# Patient Record
Sex: MALE | Race: WHITE | Employment: UNEMPLOYED | URBAN - METROPOLITAN AREA
[De-identification: names, ages, dates, MRNs, and addresses within clinical notes are randomized per-mention and may not be internally consistent; named-entity substitution may affect disease eponyms.]

---

## 2017-02-27 ENCOUNTER — APPOINTMENT (OUTPATIENT)
Dept: GENERAL RADIOLOGY | Age: 57
End: 2017-02-27
Attending: EMERGENCY MEDICINE
Payer: SELF-PAY

## 2017-02-27 ENCOUNTER — HOSPITAL ENCOUNTER (EMERGENCY)
Age: 57
Discharge: HOME | End: 2017-02-27
Attending: EMERGENCY MEDICINE
Payer: SELF-PAY

## 2017-02-27 ENCOUNTER — APPOINTMENT (OUTPATIENT)
Dept: CT IMAGING | Age: 57
End: 2017-02-27
Attending: EMERGENCY MEDICINE
Payer: SELF-PAY

## 2017-02-27 VITALS
OXYGEN SATURATION: 95 % | DIASTOLIC BLOOD PRESSURE: 77 MMHG | TEMPERATURE: 98.9 F | HEART RATE: 71 BPM | BODY MASS INDEX: 21.36 KG/M2 | WEIGHT: 144.25 LBS | RESPIRATION RATE: 18 BRPM | SYSTOLIC BLOOD PRESSURE: 122 MMHG | HEIGHT: 69 IN

## 2017-02-27 DIAGNOSIS — R10.33 PERIUMBILICAL ABDOMINAL PAIN: Primary | ICD-10-CM

## 2017-02-27 LAB
ALBUMIN SERPL BCP-MCNC: 4.2 G/DL (ref 3.5–5)
ALBUMIN/GLOB SERPL: 1.1 {RATIO} (ref 1.1–2.2)
ALP SERPL-CCNC: 101 U/L (ref 45–117)
ALT SERPL-CCNC: 22 U/L (ref 12–78)
ANION GAP BLD CALC-SCNC: 8 MMOL/L (ref 5–15)
AST SERPL W P-5'-P-CCNC: 11 U/L (ref 15–37)
BASOPHILS # BLD AUTO: 0 K/UL (ref 0–0.1)
BASOPHILS # BLD: 0 % (ref 0–1)
BILIRUB SERPL-MCNC: 0.6 MG/DL (ref 0.2–1)
BUN SERPL-MCNC: 8 MG/DL (ref 6–20)
BUN/CREAT SERPL: 9 (ref 12–20)
CALCIUM SERPL-MCNC: 9.3 MG/DL (ref 8.5–10.1)
CHLORIDE SERPL-SCNC: 101 MMOL/L (ref 97–108)
CO2 SERPL-SCNC: 27 MMOL/L (ref 21–32)
CREAT SERPL-MCNC: 0.85 MG/DL (ref 0.7–1.3)
EOSINOPHIL # BLD: 0 K/UL (ref 0–0.4)
EOSINOPHIL NFR BLD: 0 % (ref 0–7)
ERYTHROCYTE [DISTWIDTH] IN BLOOD BY AUTOMATED COUNT: 13.9 % (ref 11.5–14.5)
GLOBULIN SER CALC-MCNC: 4 G/DL (ref 2–4)
GLUCOSE SERPL-MCNC: 102 MG/DL (ref 65–100)
HCT VFR BLD AUTO: 41.5 % (ref 36.6–50.3)
HGB BLD-MCNC: 13.6 G/DL (ref 12.1–17)
LIPASE SERPL-CCNC: 273 U/L (ref 73–393)
LYMPHOCYTES # BLD AUTO: 15 % (ref 12–49)
LYMPHOCYTES # BLD: 1.8 K/UL (ref 0.8–3.5)
MCH RBC QN AUTO: 28.3 PG (ref 26–34)
MCHC RBC AUTO-ENTMCNC: 32.8 G/DL (ref 30–36.5)
MCV RBC AUTO: 86.5 FL (ref 80–99)
MONOCYTES # BLD: 0.5 K/UL (ref 0–1)
MONOCYTES NFR BLD AUTO: 4 % (ref 5–13)
NEUTS SEG # BLD: 9.8 K/UL (ref 1.8–8)
NEUTS SEG NFR BLD AUTO: 81 % (ref 32–75)
PLATELET # BLD AUTO: 330 K/UL (ref 150–400)
POTASSIUM SERPL-SCNC: 3.8 MMOL/L (ref 3.5–5.1)
PROT SERPL-MCNC: 8.2 G/DL (ref 6.4–8.2)
RBC # BLD AUTO: 4.8 M/UL (ref 4.1–5.7)
SODIUM SERPL-SCNC: 136 MMOL/L (ref 136–145)
WBC # BLD AUTO: 12.1 K/UL (ref 4.1–11.1)

## 2017-02-27 PROCEDURE — 74011250636 HC RX REV CODE- 250/636: Performed by: EMERGENCY MEDICINE

## 2017-02-27 PROCEDURE — 83690 ASSAY OF LIPASE: CPT | Performed by: EMERGENCY MEDICINE

## 2017-02-27 PROCEDURE — 74011636320 HC RX REV CODE- 636/320: Performed by: EMERGENCY MEDICINE

## 2017-02-27 PROCEDURE — 99284 EMERGENCY DEPT VISIT MOD MDM: CPT

## 2017-02-27 PROCEDURE — 74011000258 HC RX REV CODE- 258: Performed by: EMERGENCY MEDICINE

## 2017-02-27 PROCEDURE — 71020 XR CHEST PA LAT: CPT

## 2017-02-27 PROCEDURE — 36415 COLL VENOUS BLD VENIPUNCTURE: CPT | Performed by: EMERGENCY MEDICINE

## 2017-02-27 PROCEDURE — 74177 CT ABD & PELVIS W/CONTRAST: CPT

## 2017-02-27 PROCEDURE — 96374 THER/PROPH/DIAG INJ IV PUSH: CPT

## 2017-02-27 PROCEDURE — 80053 COMPREHEN METABOLIC PANEL: CPT | Performed by: EMERGENCY MEDICINE

## 2017-02-27 PROCEDURE — 85025 COMPLETE CBC W/AUTO DIFF WBC: CPT | Performed by: EMERGENCY MEDICINE

## 2017-02-27 RX ORDER — HYDROCODONE BITARTRATE AND ACETAMINOPHEN 5; 325 MG/1; MG/1
1 TABLET ORAL
Qty: 20 TAB | Refills: 0 | Status: ON HOLD | OUTPATIENT
Start: 2017-02-27 | End: 2020-10-14

## 2017-02-27 RX ORDER — SODIUM CHLORIDE 0.9 % (FLUSH) 0.9 %
10 SYRINGE (ML) INJECTION
Status: COMPLETED | OUTPATIENT
Start: 2017-02-27 | End: 2017-02-27

## 2017-02-27 RX ORDER — MORPHINE SULFATE 4 MG/ML
6 INJECTION, SOLUTION INTRAMUSCULAR; INTRAVENOUS
Status: COMPLETED | OUTPATIENT
Start: 2017-02-27 | End: 2017-02-27

## 2017-02-27 RX ADMIN — Medication 10 ML: at 21:31

## 2017-02-27 RX ADMIN — SODIUM CHLORIDE 100 ML: 900 INJECTION, SOLUTION INTRAVENOUS at 21:31

## 2017-02-27 RX ADMIN — IOPAMIDOL 100 ML: 755 INJECTION, SOLUTION INTRAVENOUS at 21:31

## 2017-02-27 RX ADMIN — Medication 6 MG: at 22:01

## 2017-02-28 NOTE — ED PROVIDER NOTES
HPI Comments: 65 y.o. male with past medical history significant for substance abuse, depression, mood disorder, hugo, and bipolar disorder who presents from home with chief complaint of abdominal pain. Patient states he has a hx of umbilical hernia 1 year ago, but did not have surgery. Patient notes he was picking up trees around 1 week ago when he suddenly had umbilical abdominal pain. Patient reports the pain has progressively worsened and described as a \"cutting inside. \" Patient also complains of cough, diarrhea, and fever. Patient notes he has chest pain when he has a strong cough on the right side. Patient notes he has a hx of lung nodules and cyst. Patient admits to smoking, but denies alcohol use. There are no other acute medical concerns at this time. Social hx: Tobacco Use: Yes (1 pack per day), Alcohol Use: No, Drug Use: Yes (Marijuana)    Note written by Maggie Montanez, as dictated by Susan Dangelo MD 9:14 PM      The history is provided by the patient. Past Medical History:   Diagnosis Date    B[facility] ([facility] [address]     Depression     Hugo ([facility] [address]     Mood disorder (HCC)     Psychotic disorder     Substance abuse        Past Surgical History:   Procedure Laterality Date    ABDOMEN SURGERY PROC UNLISTED      hernia         History reviewed. No pertinent family history. Social History     Social History    Marital status:      Spouse name: N/A    Number of children: N/A    Years of education: N/A     Occupational History    Not on file.      Social History Main Topics    Smoking status: Current Every Day Smoker     Packs/day: 1.00    Smokeless tobacco: Not on file    Alcohol use No    Drug use: Yes     Special: Marijuana    Sexual activity: Not on file     Other Topics Concern    Not on file     Social History Narrative    ** Merged History Encounter **              ALLERGIES: Bee sting [sting, bee]    Review of Systems   Constitutional: Positive for fever. Negative for chills. HENT: Negative for rhinorrhea and sore throat. Respiratory: Positive for cough. Negative for shortness of breath. Cardiovascular: Positive for chest pain. Gastrointestinal: Positive for abdominal pain and diarrhea. Negative for nausea and vomiting. Genitourinary: Negative for dysuria and hematuria. Musculoskeletal: Negative for arthralgias and myalgias. Skin: Negative for pallor and rash. Neurological: Negative for dizziness, weakness and light-headedness. All other systems reviewed and are negative. Vitals:    02/27/17 1951   BP: 133/76   Pulse: 90   Resp: 16   Temp: 98.9 °F (37.2 °C)   SpO2: 96%   Weight: 65.4 kg (144 lb 4 oz)   Height: 5' 9\" (1.753 m)            Physical Exam     Const:  No acute distress, well developed, well nourished  Head:  Atraumatic, normocephalic  Eyes:  PERRL, conjunctiva normal, no scleral icterus  Neck:  Supple, trachea midline  Cardiovascular:  RRR, no murmurs, no gallops, no rubs  Resp:  No resp distress, no increased work of breathing, no wheezes, no rhonchi, no rales,  Abd:  Soft, non-distended, no rebound, no guarding, no CVA tenderness  Minimal periumbilical tenderness upon palpation  :  Deferred  MSK:  No pedal edema, normal ROM  Neuro:  Alert and oriented x3, no cranial nerve defect  Skin:  Warm, dry, intact  Psych: normal mood and affect, behavior is normal, judgement and thought content is normal    Note written by Maggie Bowman, as dictated by Margaret Winters MD 9:18 PM    MDM  Number of Diagnoses or Management Options  Periumbilical abdominal pain:      Amount and/or Complexity of Data Reviewed  Clinical lab tests: ordered and reviewed  Tests in the radiology section of CPT®: ordered and reviewed  Review and summarize past medical records: yes    Patient Progress  Patient progress: stable    ED Course     Pt. Presents to the ER with abdominal pain at the site of his known hernia.   He is well appearing in the ER. Pt. Also is getting over a flu-like illness. No pancreatitis, abscess, obstruction, incarcerated hernia, or other acute process on CT. Pt's sx have improved in the ER. I will start him on norco as needed for pain. Pt. To f/u with his PCP or return to the ER with worsening sx.       Procedures

## 2017-02-28 NOTE — ED NOTES
Pt repositioned for comfort. V/S stable, no distress noted. Will continue to assess and monitor closely.

## 2017-02-28 NOTE — DISCHARGE INSTRUCTIONS
Abdominal Pain: Care Instructions  Your Care Instructions    Abdominal pain has many possible causes. Some aren't serious and get better on their own in a few days. Others need more testing and treatment. If your pain continues or gets worse, you need to be rechecked and may need more tests to find out what is wrong. You may need surgery to correct the problem. Don't ignore new symptoms, such as fever, nausea and vomiting, urination problems, pain that gets worse, and dizziness. These may be signs of a more serious problem. Your doctor may have recommended a follow-up visit in the next 8 to 12 hours. If you are not getting better, you may need more tests or treatment. The doctor has checked you carefully, but problems can develop later. If you notice any problems or new symptoms, get medical treatment right away. Follow-up care is a key part of your treatment and safety. Be sure to make and go to all appointments, and call your doctor if you are having problems. It's also a good idea to know your test results and keep a list of the medicines you take. How can you care for yourself at home? · Rest until you feel better. · To prevent dehydration, drink plenty of fluids, enough so that your urine is light yellow or clear like water. Choose water and other caffeine-free clear liquids until you feel better. If you have kidney, heart, or liver disease and have to limit fluids, talk with your doctor before you increase the amount of fluids you drink. · If your stomach is upset, eat mild foods, such as rice, dry toast or crackers, bananas, and applesauce. Try eating several small meals instead of two or three large ones. · Wait until 48 hours after all symptoms have gone away before you have spicy foods, alcohol, and drinks that contain caffeine. · Do not eat foods that are high in fat. · Avoid anti-inflammatory medicines such as aspirin, ibuprofen (Advil, Motrin), and naproxen (Aleve).  These can cause stomach upset. Talk to your doctor if you take daily aspirin for another health problem. When should you call for help? Call 911 anytime you think you may need emergency care. For example, call if:  · You passed out (lost consciousness). · You pass maroon or very bloody stools. · You vomit blood or what looks like coffee grounds. · You have new, severe belly pain. Call your doctor now or seek immediate medical care if:  · Your pain gets worse, especially if it becomes focused in one area of your belly. · You have a new or higher fever. · Your stools are black and look like tar, or they have streaks of blood. · You have unexpected vaginal bleeding. · You have symptoms of a urinary tract infection. These may include:  ¨ Pain when you urinate. ¨ Urinating more often than usual.  ¨ Blood in your urine. · You are dizzy or lightheaded, or you feel like you may faint. Watch closely for changes in your health, and be sure to contact your doctor if:  · You are not getting better after 1 day (24 hours). Where can you learn more? Go to http://darylIMNEXTbetty.info/. Enter N992 in the search box to learn more about \"Abdominal Pain: Care Instructions. \"  Current as of: May 27, 2016  Content Version: 11.1  © 7220-3937 [employer]. Care instructions adapted under license by [employer] (which disclaims liability or warranty for this information). If you have questions about a medical condition or this instruction, always ask your healthcare professional. [place] [address] any warranty or liability for your use of this information. We hope that we have addressed all of your medical concerns. The examination and treatment you received in the Emergency Department were for an emergent problem and were not intended as complete care. It is important that you follow up with your healthcare provider(s) for ongoing care.  If your symptoms worsen or do not improve as expected, and you are unable to reach your usual health care provider(s), you should return to the Emergency Department. Today's healthcare is undergoing tremendous change, and patient satisfaction surveys are one of the many tools to assess the quality of medical care. You may receive a survey from the [employer] regarding your experience in the Emergency Department. I hope that your experience has been completely positive, particularly the medical care that I provided. As such, please participate in the survey; anything less than excellent does not meet my expectations or intentions. [facility] and [facility] participate in nationally recognized quality of care measures. If your blood pressure is greater than 120/80, as reported below, we urge that you seek medical care to address the potential of high blood pressure, commonly known as hypertension. Hypertension can be hereditary or can be caused by certain medical conditions, pain, stress, or \"white coat syndrome. \"       Please make an appointment with your health care provider(s) for follow up of your Emergency Department visit. VITALS:   Patient Vitals for the past 8 hrs:   Temp Pulse Resp BP SpO2   02/27/17 2315 98.9 °F (37.2 °C) - - - -   02/27/17 2230 - - - 122/77 95 %   02/27/17 2200 - - - 114/83 97 %   02/27/17 2152 99.2 °F (37.3 °C) 71 18 131/81 99 %   02/27/17 2121 - 70 18 124/79 96 %   02/27/17 1951 98.9 °F (37.2 °C) 90 16 133/76 96 %          Thank you for allowing us to provide you with medical care today. We realize that you have many choices for your emergency care needs. Please choose us in the future for any continued health care needs. Rianna Brush  [place] [address]   Office: [phone]            Recent Results (from the past 24 hour(s))   CBC WITH AUTOMATED DIFF    Collection Time: 02/27/17  8:01 PM   Result Value Ref Range    WBC 12.1 (H) 4.1 - 11.1 K/uL    RBC 4.80 4. 10 - 5.70 M/uL    HGB 13.6 12.1 - 17.0 g/dL    HCT 41.5 36.6 - 50.3 %    MCV 86.5 80.0 - 99.0 FL    MCH 28.3 26.0 - 34.0 PG    MCHC 32.8 30.0 - 36.5 g/dL    RDW 13.9 11.5 - 14.5 %    PLATELET 069 424 - 430 K/uL    NEUTROPHILS 81 (H) 32 - 75 %    LYMPHOCYTES 15 12 - 49 %    MONOCYTES 4 (L) 5 - 13 %    EOSINOPHILS 0 0 - 7 %    BASOPHILS 0 0 - 1 %    ABS. NEUTROPHILS 9.8 (H) 1.8 - 8.0 K/UL    ABS. LYMPHOCYTES 1.8 0.8 - 3.5 K/UL    ABS. MONOCYTES 0.5 0.0 - 1.0 K/UL    ABS. EOSINOPHILS 0.0 0.0 - 0.4 K/UL    ABS. BASOPHILS 0.0 0.0 - 0.1 K/UL   METABOLIC PANEL, COMPREHENSIVE    Collection Time: 02/27/17  8:01 PM   Result Value Ref Range    Sodium 136 136 - 145 mmol/L    Potassium 3.8 3.5 - 5.1 mmol/L    Chloride 101 97 - 108 mmol/L    CO2 27 21 - 32 mmol/L    Anion gap 8 5 - 15 mmol/L    Glucose 102 (H) 65 - 100 mg/dL    BUN 8 6 - 20 MG/DL    Creatinine 0.85 0.70 - 1.30 MG/DL    BUN/Creatinine ratio 9 (L) 12 - 20      GFR est AA >60 >60 ml/min/1.73m2    GFR est non-AA >60 >60 ml/min/1.73m2    Calcium 9.3 8.5 - 10.1 MG/DL    Bilirubin, total 0.6 0.2 - 1.0 MG/DL    ALT (SGPT) 22 12 - 78 U/L    AST (SGOT) 11 (L) 15 - 37 U/L    Alk. phosphatase 101 45 - 117 U/L    Protein, total 8.2 6.4 - 8.2 g/dL    Albumin 4.2 3.5 - 5.0 g/dL    Globulin 4.0 2.0 - 4.0 g/dL    A-G Ratio 1.1 1.1 - 2.2     LIPASE    Collection Time: 02/27/17  8:01 PM   Result Value Ref Range    Lipase 273 73 - 393 U/L       Xr Chest Pa Lat    Result Date: 2/27/2017  EXAM:  XR CHEST PA LAT INDICATION:   Complains of cough, diarrhea, and fever. Patient notes he has chest pain when he has a strong cough on the right side. COMPARISON: Chest x-ray 4/6/2009. FINDINGS: PA and lateral radiographs of the chest demonstrate clear lungs. The cardiac and mediastinal contours and pulmonary vascularity are normal.  The bones and soft tissues are within normal limits. IMPRESSION: No acute findings.      Ct Abd Pelv W Cont    Result Date: 2/27/2017  INDICATION: hernia, vomiting and pain  hx of umbilical hernia 1 year ago, but did not have surgery. Patient notes he was picking up trees around 1 week ago when he suddenly had umbilical abdominal pain. Patient reports the pain has progressively worsened and described as a \"cutting inside. \" Patient also complains of cough, diarrhea, and fever. COMPARISON: None. TECHNIQUE: Following the uneventful intravenous administration of 100 cc Isovue-370, thin axial images were obtained through the abdomen and pelvis. Coronal and sagittal reconstructions were generated. Oral contrast was not administered. CT dose reduction was achieved through use of a standardized protocol tailored for this examination and automatic exposure control for dose modulation. Adaptive statistical iterative reconstruction (ASIR) was utilized. FINDINGS: LUNG BASES: 5 mm right lower lobe nodule. Otherwise clear. INCIDENTALLY IMAGED HEART AND MEDIASTINUM: Normal size without pericardial effusion. Coronary artery calcifications noted. LIVER: No mass or biliary dilatation. GALLBLADDER: Unremarkable. SPLEEN: No mass. PANCREAS: No mass or ductal dilatation. ADRENALS: Unremarkable. KIDNEYS: Small cyst. No enhancing mass, stone, or hydronephrosis. STOMACH: Unremarkable. SMALL BOWEL: No dilatation or wall thickening. COLON: No dilatation or wall thickening. Noninflamed appearing sigmoid diverticula. APPENDIX: Unremarkable. PERITONEUM: No ascites or pneumoperitoneum. RETROPERITONEUM: No lymphadenopathy or aortic aneurysm. REPRODUCTIVE ORGANS: Seminal vesicles and prostate appear normal. URINARY BLADDER: No mass or calculus. BONES: No destructive bone lesion. ADDITIONAL COMMENTS: N/A     IMPRESSION: 1. No acute findings or findings correlate with pain. 2.  5 mm right lower lobe pulmonary nodule.  PEER REVIEWED RECOMMENDATIONS FOR FOLLOW-UP AND MANAGEMENT OF NODULES SMALLER THAN 8 MM DETECTED INCIDENTALLY AT NONSCREENING CT PROVIDED FOR INFORMATIONAL PURPOSES: LOW-RISK PATIENTS: > 4-6 MM:  Follow-up CT at 12 mo; if unchanged, no further follow-up. HIGH-RISK PATIENTS:  > 4-6 MM:  Initial follow-up CT at 6-12 months then at 18-24 months if no change. 3. Additional incidental findings as above.

## 2017-02-28 NOTE — ED TRIAGE NOTES
I was told I had a hernia about a year ago and haven't done any thing about it. A week ago Sat. I was lifting wood and since then I have been having a constant sharp pulling pain in my abdomen. I also have been around the flu and have been having a fever, H/A and body aches but the stomach pain is different.

## 2018-03-03 ENCOUNTER — APPOINTMENT (OUTPATIENT)
Dept: CT IMAGING | Age: 58
End: 2018-03-03
Attending: EMERGENCY MEDICINE
Payer: MEDICARE

## 2018-03-03 ENCOUNTER — HOSPITAL ENCOUNTER (EMERGENCY)
Age: 58
Discharge: HOME | End: 2018-03-03
Attending: EMERGENCY MEDICINE
Payer: MEDICARE

## 2018-03-03 VITALS
SYSTOLIC BLOOD PRESSURE: 130 MMHG | RESPIRATION RATE: 18 BRPM | HEIGHT: 68 IN | DIASTOLIC BLOOD PRESSURE: 70 MMHG | BODY MASS INDEX: 20.16 KG/M2 | OXYGEN SATURATION: 99 % | WEIGHT: 133 LBS | TEMPERATURE: 98.8 F | HEART RATE: 68 BPM

## 2018-03-03 DIAGNOSIS — R11.2 NON-INTRACTABLE VOMITING WITH NAUSEA, UNSPECIFIED VOMITING TYPE: Primary | ICD-10-CM

## 2018-03-03 DIAGNOSIS — E86.0 DEHYDRATION: ICD-10-CM

## 2018-03-03 LAB
ALBUMIN SERPL-MCNC: 3.9 G/DL (ref 3.5–5)
ALBUMIN/GLOB SERPL: 1.2 {RATIO} (ref 1.1–2.2)
ALP SERPL-CCNC: 66 U/L (ref 45–117)
ALT SERPL-CCNC: 17 U/L (ref 12–78)
ANION GAP SERPL CALC-SCNC: 9 MMOL/L (ref 5–15)
APPEARANCE UR: CLEAR
AST SERPL-CCNC: 19 U/L (ref 15–37)
BASOPHILS # BLD: 0 K/UL (ref 0–0.1)
BASOPHILS NFR BLD: 0 % (ref 0–1)
BILIRUB SERPL-MCNC: 0.9 MG/DL (ref 0.2–1)
BILIRUB UR QL: NEGATIVE
BUN SERPL-MCNC: 15 MG/DL (ref 6–20)
BUN/CREAT SERPL: 14 (ref 12–20)
CALCIUM SERPL-MCNC: 8.9 MG/DL (ref 8.5–10.1)
CHLORIDE SERPL-SCNC: 106 MMOL/L (ref 97–108)
CO2 SERPL-SCNC: 25 MMOL/L (ref 21–32)
COLOR UR: NORMAL
CREAT SERPL-MCNC: 1.05 MG/DL (ref 0.7–1.3)
DIFFERENTIAL METHOD BLD: NORMAL
EOSINOPHIL # BLD: 0 K/UL (ref 0–0.4)
EOSINOPHIL NFR BLD: 0 % (ref 0–7)
ERYTHROCYTE [DISTWIDTH] IN BLOOD BY AUTOMATED COUNT: 13.6 % (ref 11.5–14.5)
GLOBULIN SER CALC-MCNC: 3.2 G/DL (ref 2–4)
GLUCOSE SERPL-MCNC: 112 MG/DL (ref 65–100)
GLUCOSE UR STRIP.AUTO-MCNC: NEGATIVE MG/DL
HCT VFR BLD AUTO: 38 % (ref 36.6–50.3)
HGB BLD-MCNC: 12.7 G/DL (ref 12.1–17)
HGB UR QL STRIP: NEGATIVE
IMM GRANULOCYTES # BLD: 0 K/UL (ref 0–0.04)
IMM GRANULOCYTES NFR BLD AUTO: 0 % (ref 0–0.5)
KETONES UR QL STRIP.AUTO: NEGATIVE MG/DL
LEUKOCYTE ESTERASE UR QL STRIP.AUTO: NEGATIVE
LYMPHOCYTES # BLD: 1.9 K/UL (ref 0.8–3.5)
LYMPHOCYTES NFR BLD: 19 % (ref 12–49)
MCH RBC QN AUTO: 28.9 PG (ref 26–34)
MCHC RBC AUTO-ENTMCNC: 33.4 G/DL (ref 30–36.5)
MCV RBC AUTO: 86.6 FL (ref 80–99)
MONOCYTES # BLD: 0.6 K/UL (ref 0–1)
MONOCYTES NFR BLD: 6 % (ref 5–13)
NEUTS SEG # BLD: 7.6 K/UL (ref 1.8–8)
NEUTS SEG NFR BLD: 75 % (ref 32–75)
NITRITE UR QL STRIP.AUTO: NEGATIVE
NRBC # BLD: 0 K/UL (ref 0–0.01)
NRBC BLD-RTO: 0 PER 100 WBC
PH UR STRIP: 6.5 [PH] (ref 5–8)
PLATELET # BLD AUTO: 370 K/UL (ref 150–400)
PMV BLD AUTO: 8.9 FL (ref 8.9–12.9)
POTASSIUM SERPL-SCNC: 3.3 MMOL/L (ref 3.5–5.1)
PROT SERPL-MCNC: 7.1 G/DL (ref 6.4–8.2)
PROT UR STRIP-MCNC: NEGATIVE MG/DL
RBC # BLD AUTO: 4.39 M/UL (ref 4.1–5.7)
SODIUM SERPL-SCNC: 140 MMOL/L (ref 136–145)
SP GR UR REFRACTOMETRY: 1.01
UROBILINOGEN UR QL STRIP.AUTO: 1 EU/DL (ref 0.2–1)
WBC # BLD AUTO: 10.1 K/UL (ref 4.1–11.1)

## 2018-03-03 PROCEDURE — 74011250636 HC RX REV CODE- 250/636: Performed by: EMERGENCY MEDICINE

## 2018-03-03 PROCEDURE — 74177 CT ABD & PELVIS W/CONTRAST: CPT

## 2018-03-03 PROCEDURE — 74011000258 HC RX REV CODE- 258: Performed by: EMERGENCY MEDICINE

## 2018-03-03 PROCEDURE — 96374 THER/PROPH/DIAG INJ IV PUSH: CPT

## 2018-03-03 PROCEDURE — 36415 COLL VENOUS BLD VENIPUNCTURE: CPT | Performed by: EMERGENCY MEDICINE

## 2018-03-03 PROCEDURE — 80053 COMPREHEN METABOLIC PANEL: CPT | Performed by: EMERGENCY MEDICINE

## 2018-03-03 PROCEDURE — 74011636320 HC RX REV CODE- 636/320: Performed by: EMERGENCY MEDICINE

## 2018-03-03 PROCEDURE — 81003 URINALYSIS AUTO W/O SCOPE: CPT | Performed by: EMERGENCY MEDICINE

## 2018-03-03 PROCEDURE — 85025 COMPLETE CBC W/AUTO DIFF WBC: CPT | Performed by: EMERGENCY MEDICINE

## 2018-03-03 PROCEDURE — 96361 HYDRATE IV INFUSION ADD-ON: CPT

## 2018-03-03 PROCEDURE — 99284 EMERGENCY DEPT VISIT MOD MDM: CPT

## 2018-03-03 RX ORDER — ONDANSETRON 4 MG/1
4 TABLET, ORALLY DISINTEGRATING ORAL
Qty: 10 TAB | Refills: 0 | Status: ON HOLD | OUTPATIENT
Start: 2018-03-03 | End: 2020-10-14

## 2018-03-03 RX ORDER — SODIUM CHLORIDE 0.9 % (FLUSH) 0.9 %
10 SYRINGE (ML) INJECTION
Status: COMPLETED | OUTPATIENT
Start: 2018-03-03 | End: 2018-03-03

## 2018-03-03 RX ORDER — ONDANSETRON 2 MG/ML
INJECTION INTRAMUSCULAR; INTRAVENOUS
Status: DISCONTINUED
Start: 2018-03-03 | End: 2018-03-03 | Stop reason: HOSPADM

## 2018-03-03 RX ORDER — ONDANSETRON 2 MG/ML
4 INJECTION INTRAMUSCULAR; INTRAVENOUS ONCE
Status: COMPLETED | OUTPATIENT
Start: 2018-03-03 | End: 2018-03-03

## 2018-03-03 RX ORDER — DICYCLOMINE HYDROCHLORIDE 10 MG/1
10 CAPSULE ORAL 4 TIMES DAILY
Qty: 20 CAP | Refills: 0 | Status: SHIPPED | OUTPATIENT
Start: 2018-03-03 | End: 2018-03-08

## 2018-03-03 RX ADMIN — IOPAMIDOL 100 ML: 755 INJECTION, SOLUTION INTRAVENOUS at 02:17

## 2018-03-03 RX ADMIN — SODIUM CHLORIDE 1000 ML: 900 INJECTION, SOLUTION INTRAVENOUS at 01:22

## 2018-03-03 RX ADMIN — ONDANSETRON 4 MG: 2 INJECTION INTRAMUSCULAR; INTRAVENOUS at 01:23

## 2018-03-03 RX ADMIN — Medication 10 ML: at 02:17

## 2018-03-03 RX ADMIN — SODIUM CHLORIDE 100 ML: 900 INJECTION, SOLUTION INTRAVENOUS at 02:17

## 2018-03-03 RX ADMIN — SODIUM CHLORIDE 1000 ML: 900 INJECTION, SOLUTION INTRAVENOUS at 03:00

## 2018-03-03 NOTE — ED NOTES
I have reviewed discharge instructions with the patient. The patient verbalized understanding. VSS, respirations unlabored and in no acute distress. Ambulated to the waiting room to wait for ride home. Steady gait noted.

## 2018-03-03 NOTE — ED PROVIDER NOTES
HPI Comments: 63 y.o. male with past medical history significant for Substance abuse, Psychiatric disorder who presents from home with chief complaint of abdominal pain. Pt reports 3 day hx of nausea with vomiting. He also c/o periumbilical abdominal pain described as \"pressure\". The pain is severe. He denies hx of similar symptoms. He notes a known ventral hernia. No hx of abdominal surgery. He has had normal bowel movements, diarrhea the first day which improved. Pt denies known fever or urinary symptoms. There are no other acute medical concerns at this time. PCP: None    Note written by Maggie Loo, as dictated by Jerlean Fabry, MD 2:17 AM    The history is provided by the patient. No  was used. Past Medical History:   Diagnosis Date    [facility] ([facility] [address]     Depression     Emilia ([facility] [address]     Mood disorder (HCC)     Psychotic disorder     Substance abuse        Past Surgical History:   Procedure Laterality Date    ABDOMEN SURGERY PROC UNLISTED      hernia         History reviewed. No pertinent family history. Social History     Social History    Marital status:      Spouse name: N/A    Number of children: N/A    Years of education: N/A     Occupational History    Not on file. Social History Main Topics    Smoking status: Current Every Day Smoker     Packs/day: 1.00    Smokeless tobacco: Never Used    Alcohol use No    Drug use: Yes     Special: Marijuana, Cocaine, Prescription    Sexual activity: Not on file     Other Topics Concern    Not on file     Social History Narrative    ** Merged History Encounter **              ALLERGIES: Bee sting [sting, bee]    Review of Systems   Constitutional: Negative for chills and fever. HENT: Negative for congestion, nosebleeds and sore throat. Eyes: Negative for pain and discharge. Respiratory: Negative for cough and shortness of breath.     Cardiovascular: Negative for chest pain and palpitations. Gastrointestinal: Positive for abdominal pain, nausea and vomiting. Negative for blood in stool, constipation and diarrhea. Genitourinary: Negative for decreased urine volume, dysuria, flank pain and urgency. Musculoskeletal: Negative for gait problem and myalgias. Skin: Negative for rash and wound. Neurological: Negative for seizures and syncope. Hematological: Does not bruise/bleed easily. Vitals:    03/03/18 0049   BP: 121/63   Pulse: 87   Resp: 20   Temp: 99 °F (37.2 °C)   SpO2: 90%   Weight: 60.3 kg (133 lb)   Height: 5' 8\" (1.727 m)            Physical Exam   Constitutional: He is oriented to person, place, and time. He appears well-developed. He appears cachectic. Thin   HENT:   Head: Normocephalic and atraumatic. Mouth/Throat: Mucous membranes are dry. Eyes: EOM are normal. Pupils are equal, round, and reactive to light. Neck: Normal range of motion. Neck supple. Cardiovascular: Normal rate, regular rhythm, normal heart sounds and intact distal pulses. Pulmonary/Chest: Effort normal and breath sounds normal. No respiratory distress. He has no wheezes. Abdominal: Soft. Bowel sounds are normal. There is no tenderness. There is no rebound and no guarding. Musculoskeletal: Normal range of motion. Neurological: He is alert and oriented to person, place, and time. Skin: Skin is warm and dry. Psychiatric: He has a normal mood and affect. His behavior is normal.   Nursing note and vitals reviewed. Note written by Maggie Prado, as dictated by Hui Diaz MD 2:20 AM    MDM  Number of Diagnoses or Management Options  Dehydration:   Non-intractable vomiting with nausea, unspecified vomiting type:   Diagnosis management comments: 62-year-old male presents with complaints of periumbilical abdominal pain with nausea and vomiting x3 days. Denies diarrhea, fever.  Patient is afebrile, thin, appears dehydrated, no acute distress, hemodynamically stable, abdomen soft soft nontender/nondistended. Plan-IV fluid hydration, CBC/CMP/UA, nausea control, CT abdomen pelvis. Labs unremarkable  CT shows no acute process, stable pulmonary nodule. Amount and/or Complexity of Data Reviewed  Clinical lab tests: ordered and reviewed  Tests in the radiology section of CPT®: ordered and reviewed  Independent visualization of images, tracings, or specimens: yes    Patient Progress  Patient progress: improved        ED Course       Procedures    Patient's results have been reviewed with them. Patient and/or family have verbally conveyed their understanding and agreement of the patient's signs, symptoms, diagnosis, treatment and prognosis and additionally agree to follow up as recommended or return to the Emergency Room should their condition change prior to follow-up. Discharge instructions have also been provided to the patient with some educational information regarding their diagnosis as well a list of reasons why they would want to return to the ER prior to their follow-up appointment should their condition change.

## 2018-03-03 NOTE — ED NOTES
Assumed care of patient at this time. VSS, patient resting in stretcher. IV fluids infusing, will continue to monitor.

## 2018-03-03 NOTE — DISCHARGE INSTRUCTIONS
Nausea and Vomiting: Care Instructions  Your Care Instructions    When you are nauseated, you may feel weak and sweaty and notice a lot of saliva in your mouth. Nausea often leads to vomiting. Most of the time you do not need to worry about nausea and vomiting, but they can be signs of other illnesses. Two common causes of nausea and vomiting are stomach flu and food poisoning. Nausea and vomiting from viral stomach flu will usually start to improve within 24 hours. Nausea and vomiting from food poisoning may last from 12 to 48 hours. The doctor has checked you carefully, but problems can develop later. If you notice any problems or new symptoms, get medical treatment right away. Follow-up care is a key part of your treatment and safety. Be sure to make and go to all appointments, and call your doctor if you are having problems. It's also a good idea to know your test results and keep a list of the medicines you take. How can you care for yourself at home? · To prevent dehydration, drink plenty of fluids, enough so that your urine is light yellow or clear like water. Choose water and other caffeine-free clear liquids until you feel better. If you have kidney, heart, or liver disease and have to limit fluids, talk with your doctor before you increase the amount of fluids you drink. · Rest in bed until you feel better. · When you are able to eat, try clear soups, mild foods, and liquids until all symptoms are gone for 12 to 48 hours. Other good choices include dry toast, crackers, cooked cereal, and gelatin dessert, such as Jell-O. When should you call for help? Call 911 anytime you think you may need emergency care. For example, call if:  ? · You passed out (lost consciousness). ?Call your doctor now or seek immediate medical care if:  ? · You have symptoms of dehydration, such as:  ¨ Dry eyes and a dry mouth. ¨ Passing only a little dark urine.   ¨ Feeling thirstier than usual.   ? · You have new or worsening belly pain. ? · You have a new or higher fever. ? · You vomit blood or what looks like coffee grounds. ? Watch closely for changes in your health, and be sure to contact your doctor if:  ? · You have ongoing nausea and vomiting. ? · Your vomiting is getting worse. ? · Your vomiting lasts longer than 2 days. ? · You are not getting better as expected. Where can you learn more? Go to http://daryl-betty.info/. Enter 25 [id number] in the search box to learn more about \"Nausea and Vomiting: Care Instructions. \"  Current as of: March 20, 2017  Content Version: 11.4  © 8881-5308 [employer]. Care instructions adapted under license by [employer] (which disclaims liability or warranty for this information). If you have questions about a medical condition or this instruction, always ask your healthcare professional. [place] [address] any warranty or liability for your use of this information. Dehydration: Care Instructions  Your Care Instructions  Dehydration happens when your body loses too much fluid. This might happen when you do not drink enough water or you lose large amounts of fluids from your body because of diarrhea, vomiting, or sweating. Severe dehydration can be life-threatening. Water and minerals called electrolytes help put your body fluids back in balance. Learn the early signs of fluid loss, and drink more fluids to prevent dehydration. Follow-up care is a key part of your treatment and safety. Be sure to make and go to all appointments, and call your doctor if you are having problems. It's also a good idea to know your test results and keep a list of the medicines you take. How can you care for yourself at home? · To prevent dehydration, drink plenty of fluids, enough so that your urine is light yellow or clear like water. Choose water and other caffeine-free clear liquids until you feel better.  If you have kidney, heart, or liver disease and have to limit fluids, talk with your doctor before you increase the amount of fluids you drink. · If you do not feel like eating or drinking, try taking small sips of water, sports drinks, or other rehydration drinks. · Get plenty of rest.  To prevent dehydration  · Add more fluids to your diet and daily routine, unless your doctor has told you not to. · During hot weather, drink more fluids. Drink even more fluids if you exercise a lot. Stay away from drinks with alcohol or caffeine. · Watch for the symptoms of dehydration. These include:  ¨ A dry, sticky mouth. ¨ Dark yellow urine, and not much of it. ¨ Dry and sunken eyes. ¨ Feeling very tired. · Learn what problems can lead to dehydration. These include:  ¨ Diarrhea, fever, and vomiting. ¨ Any illness with a fever, such as pneumonia or the flu. ¨ Activities that cause heavy sweating, such as endurance races and heavy outdoor work in hot or humid weather. ¨ Alcohol or drug abuse or withdrawal.  ¨ Certain medicines, such as cold and allergy pills (antihistamines), diet pills (diuretics), and laxatives. ¨ Certain diseases, such as diabetes, cancer, and heart or kidney disease. When should you call for help? Call 911 anytime you think you may need emergency care. For example, call if:  ? · You passed out (lost consciousness). ?Call your doctor now or seek immediate medical care if:  ? · You are confused and cannot think clearly. ? · You are dizzy or lightheaded, or you feel like you may faint. ? · You have signs of needing more fluids. You have sunken eyes and a dry mouth, and you pass only a little dark urine. ? · You cannot keep fluids down. ? Watch closely for changes in your health, and be sure to contact your doctor if:  ? · You are not making tears. ? · Your skin is very dry and sags slowly back into place after you pinch it. ? · Your mouth and eyes are very dry. Where can you learn more?   Go to http://daryl-betty.info/. Enter C292 in the search box to learn more about \"Dehydration: Care Instructions. \"  Current as of: March 20, 2017  Content Version: 11.4  © 4734-5959 [employer]. Care instructions adapted under license by [employer] (which disclaims liability or warranty for this information). If you have questions about a medical condition or this instruction, always ask your healthcare professional. [place] [address] any warranty or liability for your use of this information.

## 2018-03-03 NOTE — ED TRIAGE NOTES
Patient arrives via EMS from home. Patient reports generalized weakness and N/V x3 days. Pt also reports decreased appetite and mid abdominal pain. According to EMS, pt has a hx of drug use. When asked what pt takes, pt stated \"I take pain pills that I get from people in the city\". Pt reports last use was 3 days ago. Temp 99 in triage.

## 2020-10-14 ENCOUNTER — HOSPITAL ENCOUNTER (INPATIENT)
Age: 60
LOS: 9 days | Discharge: HOME | DRG: 885 | End: 2020-10-23
Attending: EMERGENCY MEDICINE | Admitting: PSYCHIATRY & NEUROLOGY
Payer: MEDICARE

## 2020-10-14 DIAGNOSIS — F31.9 BIPOLAR 1 DISORDER (HCC): Primary | ICD-10-CM

## 2020-10-14 LAB
ALBUMIN SERPL-MCNC: 3.4 G/DL (ref 3.5–5)
ALBUMIN/GLOB SERPL: 1 {RATIO} (ref 1.1–2.2)
ALP SERPL-CCNC: 79 U/L (ref 45–117)
ALT SERPL-CCNC: 37 U/L (ref 12–78)
AMPHET UR QL SCN: NEGATIVE
ANION GAP SERPL CALC-SCNC: 4 MMOL/L (ref 5–15)
APPEARANCE UR: CLEAR
AST SERPL-CCNC: 42 U/L (ref 15–37)
BACTERIA URNS QL MICRO: NEGATIVE /HPF
BARBITURATES UR QL SCN: NEGATIVE
BASOPHILS # BLD: 0.1 K/UL (ref 0–0.1)
BASOPHILS NFR BLD: 1 % (ref 0–1)
BENZODIAZ UR QL: NEGATIVE
BILIRUB SERPL-MCNC: 0.3 MG/DL (ref 0.2–1)
BILIRUB UR QL: NEGATIVE
BUN SERPL-MCNC: 12 MG/DL (ref 6–20)
BUN/CREAT SERPL: 14 (ref 12–20)
CALCIUM SERPL-MCNC: 9.3 MG/DL (ref 8.5–10.1)
CANNABINOIDS UR QL SCN: POSITIVE
CHLORIDE SERPL-SCNC: 104 MMOL/L (ref 97–108)
CO2 SERPL-SCNC: 29 MMOL/L (ref 21–32)
COCAINE UR QL SCN: NEGATIVE
COLOR UR: NORMAL
COVID-19 RAPID TEST, COVR: NOT DETECTED
CREAT SERPL-MCNC: 0.88 MG/DL (ref 0.7–1.3)
DIFFERENTIAL METHOD BLD: ABNORMAL
DRUG SCRN COMMENT,DRGCM: ABNORMAL
EOSINOPHIL # BLD: 0.2 K/UL (ref 0–0.4)
EOSINOPHIL NFR BLD: 2 % (ref 0–7)
EPITH CASTS URNS QL MICRO: NORMAL /LPF
ERYTHROCYTE [DISTWIDTH] IN BLOOD BY AUTOMATED COUNT: 14.2 % (ref 11.5–14.5)
ETHANOL SERPL-MCNC: <10 MG/DL
GLOBULIN SER CALC-MCNC: 3.4 G/DL (ref 2–4)
GLUCOSE SERPL-MCNC: 103 MG/DL (ref 65–100)
GLUCOSE UR STRIP.AUTO-MCNC: NEGATIVE MG/DL
HCT VFR BLD AUTO: 35.7 % (ref 36.6–50.3)
HEALTH STATUS, XMCV2T: NORMAL
HGB BLD-MCNC: 11.5 G/DL (ref 12.1–17)
HGB UR QL STRIP: NEGATIVE
IMM GRANULOCYTES # BLD AUTO: 0 K/UL (ref 0–0.04)
IMM GRANULOCYTES NFR BLD AUTO: 0 % (ref 0–0.5)
KETONES UR QL STRIP.AUTO: NEGATIVE MG/DL
LEUKOCYTE ESTERASE UR QL STRIP.AUTO: NEGATIVE
LYMPHOCYTES # BLD: 1.5 K/UL (ref 0.8–3.5)
LYMPHOCYTES NFR BLD: 20 % (ref 12–49)
MCH RBC QN AUTO: 28.8 PG (ref 26–34)
MCHC RBC AUTO-ENTMCNC: 32.2 G/DL (ref 30–36.5)
MCV RBC AUTO: 89.3 FL (ref 80–99)
METHADONE UR QL: NEGATIVE
MONOCYTES # BLD: 0.7 K/UL (ref 0–1)
MONOCYTES NFR BLD: 9 % (ref 5–13)
NEUTS SEG # BLD: 5.1 K/UL (ref 1.8–8)
NEUTS SEG NFR BLD: 68 % (ref 32–75)
NITRITE UR QL STRIP.AUTO: NEGATIVE
NRBC # BLD: 0 K/UL (ref 0–0.01)
NRBC BLD-RTO: 0 PER 100 WBC
OPIATES UR QL: NEGATIVE
PCP UR QL: NEGATIVE
PH UR STRIP: 7 [PH] (ref 5–8)
PLATELET # BLD AUTO: 335 K/UL (ref 150–400)
PMV BLD AUTO: 9 FL (ref 8.9–12.9)
POTASSIUM SERPL-SCNC: 4.8 MMOL/L (ref 3.5–5.1)
PROT SERPL-MCNC: 6.8 G/DL (ref 6.4–8.2)
PROT UR STRIP-MCNC: NEGATIVE MG/DL
RBC # BLD AUTO: 4 M/UL (ref 4.1–5.7)
RBC #/AREA URNS HPF: NORMAL /HPF (ref 0–5)
SARS-COV-2, COV2: NOT DETECTED
SODIUM SERPL-SCNC: 137 MMOL/L (ref 136–145)
SOURCE, COVRS: NORMAL
SP GR UR REFRACTOMETRY: 1.01 (ref 1–1.03)
SPECIMEN SOURCE, FCOV2M: NORMAL
SPECIMEN SOURCE, FCOV2M: NORMAL
SPECIMEN TYPE, XMCV1T: NORMAL
UA: UC IF INDICATED,UAUC: NORMAL
UROBILINOGEN UR QL STRIP.AUTO: 0.2 EU/DL (ref 0.2–1)
WBC # BLD AUTO: 7.4 K/UL (ref 4.1–11.1)
WBC URNS QL MICRO: NORMAL /HPF (ref 0–4)

## 2020-10-14 PROCEDURE — 65220000003 HC RM SEMIPRIVATE PSYCH

## 2020-10-14 PROCEDURE — 87635 SARS-COV-2 COVID-19 AMP PRB: CPT

## 2020-10-14 PROCEDURE — 81001 URINALYSIS AUTO W/SCOPE: CPT

## 2020-10-14 PROCEDURE — 80053 COMPREHEN METABOLIC PANEL: CPT

## 2020-10-14 PROCEDURE — 99284 EMERGENCY DEPT VISIT MOD MDM: CPT

## 2020-10-14 PROCEDURE — 85025 COMPLETE CBC W/AUTO DIFF WBC: CPT

## 2020-10-14 PROCEDURE — 74011250637 HC RX REV CODE- 250/637: Performed by: NURSE PRACTITIONER

## 2020-10-14 PROCEDURE — 36415 COLL VENOUS BLD VENIPUNCTURE: CPT

## 2020-10-14 PROCEDURE — 80307 DRUG TEST PRSMV CHEM ANLYZR: CPT

## 2020-10-14 RX ORDER — BENZTROPINE MESYLATE 1 MG/1
1 TABLET ORAL
Status: DISCONTINUED | OUTPATIENT
Start: 2020-10-14 | End: 2020-10-23 | Stop reason: HOSPADM

## 2020-10-14 RX ORDER — TRAZODONE HYDROCHLORIDE 50 MG/1
50 TABLET ORAL
Status: DISCONTINUED | OUTPATIENT
Start: 2020-10-14 | End: 2020-10-23 | Stop reason: HOSPADM

## 2020-10-14 RX ORDER — IBUPROFEN 200 MG
1 TABLET ORAL DAILY
Status: DISCONTINUED | OUTPATIENT
Start: 2020-10-15 | End: 2020-10-23 | Stop reason: HOSPADM

## 2020-10-14 RX ORDER — LORAZEPAM 2 MG/ML
1 INJECTION INTRAMUSCULAR
Status: DISCONTINUED | OUTPATIENT
Start: 2020-10-14 | End: 2020-10-23 | Stop reason: HOSPADM

## 2020-10-14 RX ORDER — OLANZAPINE 5 MG/1
5 TABLET ORAL
Status: DISCONTINUED | OUTPATIENT
Start: 2020-10-14 | End: 2020-10-23 | Stop reason: HOSPADM

## 2020-10-14 RX ORDER — HALOPERIDOL 5 MG/ML
5 INJECTION INTRAMUSCULAR
Status: DISCONTINUED | OUTPATIENT
Start: 2020-10-14 | End: 2020-10-23 | Stop reason: HOSPADM

## 2020-10-14 RX ORDER — HYDROXYZINE 25 MG/1
50 TABLET, FILM COATED ORAL
Status: DISCONTINUED | OUTPATIENT
Start: 2020-10-14 | End: 2020-10-23 | Stop reason: HOSPADM

## 2020-10-14 RX ORDER — DIPHENHYDRAMINE HYDROCHLORIDE 50 MG/ML
50 INJECTION, SOLUTION INTRAMUSCULAR; INTRAVENOUS
Status: DISCONTINUED | OUTPATIENT
Start: 2020-10-14 | End: 2020-10-23 | Stop reason: HOSPADM

## 2020-10-14 RX ORDER — ACETAMINOPHEN 325 MG/1
650 TABLET ORAL
Status: DISCONTINUED | OUTPATIENT
Start: 2020-10-14 | End: 2020-10-23 | Stop reason: HOSPADM

## 2020-10-14 RX ORDER — ADHESIVE BANDAGE
30 BANDAGE TOPICAL DAILY PRN
Status: DISCONTINUED | OUTPATIENT
Start: 2020-10-14 | End: 2020-10-23 | Stop reason: HOSPADM

## 2020-10-14 RX ADMIN — TRAZODONE HYDROCHLORIDE 50 MG: 50 TABLET ORAL at 20:06

## 2020-10-14 RX ADMIN — BENZTROPINE MESYLATE 1 MG: 1 TABLET ORAL at 20:06

## 2020-10-14 NOTE — PROGRESS NOTES
Admission Note:   61 y/o  male admitted to unit with a primary diagnosis of bipolar. Pt admitted from home as a TDO patient. Patient is oriented x4. Pt admitted d/t being found in road. ER report states pt was found praying, however, pt states he \"slipped on some oil\". Pt has history of admission to [address] [place] [address] x 2 weeks ago. States he had taken haldol and cogentin during his admission, but has been non-compliant with medications since discharge. UDS positive for thc, RAY <10. Patient cooperative with admission process, very restless, unable to sit through admission. Otherwise pleasant. Patient oriented to unit. Pt's clothing and shoes locked on unit, valuables locked with security. Pt informed of same, signed off on belongings form. Pt provided handbook, security code. Stated he was unable to read. Writer offered to go through handbook and read information to him but patient declined. Q15 minute checks initiated for safety.

## 2020-10-14 NOTE — ED TRIAGE NOTES
Pt arrived via [facility]. Per reports from police patient was found laying in the roadway and had received several reports patient was laying in the roadway talking to himself. Pt states he \"slipped on oil\" which is why he was in the roadway to begin with. Pt does deny an actual fall. Pt denies SI/HI and AVH. Pt calm and cooperative.

## 2020-10-14 NOTE — ED NOTES
Pt submitted to covid swab, and was placed in paper scrubs. Pt's pants and tshirt, shoes and jacket placed in a belongings bag labeled and placed in the black cabinet for safe keeping. Pt currently in his room with [employer] officer sitting outside the door. Pt's eyes closed appears to be sleeping.

## 2020-10-14 NOTE — ED NOTES
Pt stated he will submit to anything that is needed. Stated \"I want to get this show on the road\". Pt states he got out of [employer] two weeks ago and stopped taking his cogentin at the same time because he didn't like how it made him feel \"jittery\".

## 2020-10-14 NOTE — ED NOTES
Pt very upset to learn that he was placed under a TDO. Process was explained to patient and needed verbal redirection.
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
## 2020-10-14 NOTE — BH NOTES
GROUP THERAPY PROGRESS NOTE    Patient did not participate in Substance abuse group.      Jason Winston RN, PMHNP Student

## 2020-10-14 NOTE — ED NOTES
TRANSFER - OUT REPORT:    Verbal report given to Chen López RN (name) on Og Gray  being transferred to [address] [place] [address] (unit) for routine progression of care       Report consisted of patients Situation, Background, Assessment and   Recommendations(SBAR). Information from the following report(s) SBAR, Kardex, ED Summary and Procedure Summary was reviewed with the receiving nurse. Lines:       Opportunity for questions and clarification was provided.       Patient transported with:   Sury Miller

## 2020-10-14 NOTE — ED NOTES
Pt handcuffed by [employer] in the front of his body. Pt is calm and cooperative and willing to comply with blood work and urine specimen. Pt is alert and oriented x 4, RR even and unlabored, skin is warm and dry. Assessment completed and pt updated on plan of care. Pt provided ginger ale to drink. Emergency Department Nursing Plan of Care       The Nursing Plan of Care is developed from the Nursing assessment and Emergency Department Attending provider initial evaluation. The plan of care may be reviewed in the ED Provider note.     The Plan of Care was developed with the following considerations:   Patient / Family readiness to learn indicated by:verbalized understanding  Persons(s) to be included in education: patient  Barriers to Learning/Limitations:No    Signed     Carlos Pelayo RN    10/14/2020   11:45 AM

## 2020-10-14 NOTE — PROGRESS NOTES
Problem: Falls - Risk of  Goal: *Absence of Falls  Description: Document Albino Messing Fall Risk and appropriate interventions in the flowsheet.   Outcome: Progressing Towards Goal  Note: Fall Risk Interventions:                      History of Falls Interventions: Room close to nurse's station         Problem: Altered Thought Process (Adult/Pediatric)  Goal: *STG: Remains safe in hospital  Outcome: Progressing Towards Goal

## 2020-10-14 NOTE — ED PROVIDER NOTES
EMERGENCY DEPARTMENT HISTORY AND PHYSICAL EXAM      Date: 10/14/2020  Patient Name: Tamanna Galdamez    History of Presenting Illness     This note was entered in Error. Please disregard.
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
## 2020-10-14 NOTE — GROUP NOTE
SAT[facility]  GROUP DOCUMENTATION INDIVIDUAL Group Therapy Note Date: 10/14/2020 Group Start Time: 1400 Group End Time: 1500 Group Topic: Recreational/Music Therapy [address] [facility] 3 ACUTE BEHAV [facility] [place], [address] GROUP DOCUMENTATION GROUP Group Therapy Note Attendees: 5 Attendance: Attended Patient's Goal:  To concentrate on selected task Interventions/techniques: Supported-crafts,games,music Follows Directions: Followed directions with prompts Interactions: Did not interact appropriately Mental Status: Anxious and Preoccupied Behavior/appearance: Needed prompting Goals Achieved: Able to engage in interactions and Able to listen to others Additional Notes: Had difficulty sitting still-short attention span,hyper verbal-active participant with prompts Gurvinder Every

## 2020-10-15 PROCEDURE — 74011250636 HC RX REV CODE- 250/636: Performed by: NURSE PRACTITIONER

## 2020-10-15 PROCEDURE — 65220000003 HC RM SEMIPRIVATE PSYCH

## 2020-10-15 PROCEDURE — 74011250637 HC RX REV CODE- 250/637: Performed by: NURSE PRACTITIONER

## 2020-10-15 RX ORDER — DIVALPROEX SODIUM 250 MG/1
1250 TABLET, EXTENDED RELEASE ORAL
COMMUNITY
End: 2020-10-23

## 2020-10-15 RX ORDER — BENZTROPINE MESYLATE 1 MG/1
1 TABLET ORAL DAILY
COMMUNITY
End: 2020-10-23

## 2020-10-15 RX ORDER — DIVALPROEX SODIUM 500 MG/1
1000 TABLET, EXTENDED RELEASE ORAL
Status: DISCONTINUED | OUTPATIENT
Start: 2020-10-15 | End: 2020-10-16

## 2020-10-15 RX ORDER — HALOPERIDOL 10 MG/1
10 TABLET ORAL DAILY
COMMUNITY
End: 2020-10-23

## 2020-10-15 RX ADMIN — BENZTROPINE MESYLATE 1 MG: 1 TABLET ORAL at 11:52

## 2020-10-15 RX ADMIN — TRAZODONE HYDROCHLORIDE 50 MG: 50 TABLET ORAL at 20:07

## 2020-10-15 RX ADMIN — OLANZAPINE 5 MG: 5 TABLET, FILM COATED ORAL at 10:24

## 2020-10-15 RX ADMIN — DIPHENHYDRAMINE HYDROCHLORIDE 50 MG: 50 INJECTION, SOLUTION INTRAMUSCULAR; INTRAVENOUS at 11:52

## 2020-10-15 RX ADMIN — HYDROXYZINE HYDROCHLORIDE 50 MG: 25 TABLET, FILM COATED ORAL at 17:40

## 2020-10-15 RX ADMIN — ACETAMINOPHEN 650 MG: 325 TABLET, FILM COATED ORAL at 16:55

## 2020-10-15 RX ADMIN — OLANZAPINE 5 MG: 5 TABLET, FILM COATED ORAL at 20:07

## 2020-10-15 RX ADMIN — HYDROXYZINE HYDROCHLORIDE 50 MG: 25 TABLET, FILM COATED ORAL at 10:24

## 2020-10-15 RX ADMIN — ACETAMINOPHEN 650 MG: 325 TABLET, FILM COATED ORAL at 11:22

## 2020-10-15 RX ADMIN — ACETAMINOPHEN 650 MG: 325 TABLET, FILM COATED ORAL at 23:58

## 2020-10-15 RX ADMIN — HALOPERIDOL LACTATE 5 MG: 5 INJECTION, SOLUTION INTRAMUSCULAR at 11:52

## 2020-10-15 NOTE — PROGRESS NOTES
Laboratory monitoring for mood stabilizer and antipsychotics:    Recommended baseline monitoring has been completed based on this patient's current medication regimen. The patient is currently taking the following medication(s):   Current Facility-Administered Medications   Medication Dose Route Frequency    OXcarbazepine (TRILEPTAL) tablet 300 mg  300 mg Oral BID    OLANZapine (ZyPREXA) tablet 5 mg  5 mg Oral BID    Or    haloperidol lactate (HALDOL) injection 5 mg +++COURT ORDERED MEDICATION FOR REFUSAL OF OLANZAPINE+++  5 mg IntraMUSCular BID    nicotine (NICODERM CQ) 21 mg/24 hr patch 1 Patch  1 Patch TransDERmal DAILY     Height, Weight, BMI Estimation  Estimated body mass index is 22.15 kg/m² as calculated from the following:    Height as of this encounter: 175.3 cm (69\"). Weight as of this encounter: 68 kg (150 lb). Renal Function, Hepatic Function and Chemistry  Estimated Creatinine Clearance: 86.9 mL/min (by C-G formula based on SCr of 0.88 mg/dL). Lab Results   Component Value Date/Time    Sodium 137 10/14/2020 11:32 AM    Potassium 4.8 10/14/2020 11:32 AM    Chloride 104 10/14/2020 11:32 AM    CO2 29 10/14/2020 11:32 AM    Anion gap 4 (L) 10/14/2020 11:32 AM    Glucose 103 (H) 10/14/2020 11:32 AM    BUN 12 10/14/2020 11:32 AM    Creatinine 0.88 10/14/2020 11:32 AM    BUN/Creatinine ratio 14 10/14/2020 11:32 AM    GFR est AA >60 10/14/2020 11:32 AM    GFR est non-AA >60 10/14/2020 11:32 AM    Calcium 9.3 10/14/2020 11:32 AM    ALT (SGPT) 37 10/14/2020 11:32 AM    Alk.  phosphatase 79 10/14/2020 11:32 AM    Protein, total 6.8 10/14/2020 11:32 AM    Albumin 3.4 (L) 10/14/2020 11:32 AM    Globulin 3.4 10/14/2020 11:32 AM    A-G Ratio 1.0 (L) 10/14/2020 11:32 AM    Bilirubin, total 0.3 10/14/2020 11:32 AM       Lab Results   Component Value Date/Time    Glucose 103 (H) 10/14/2020 11:32 AM       Lab Results   Component Value Date/Time    Hemoglobin A1c 5.6 10/19/2020 05:18 AM Hematology  Lab Results   Component Value Date/Time    WBC 7.4 10/14/2020 11:32 AM    HGB 11.5 (L) 10/14/2020 11:32 AM    HCT 35.7 (L) 10/14/2020 11:32 AM    PLATELET 816 18/50/2397 11:32 AM    MCV 89.3 10/14/2020 11:32 AM       Lipids  Lab Results   Component Value Date/Time    Cholesterol, total 195 10/19/2020 05:18 AM    HDL Cholesterol 41 10/19/2020 05:18 AM    LDL,Direct 119 (H) 02/03/2014 06:04 AM    LDL, calculated 85 10/19/2020 05:18 AM    Triglyceride 345 (H) 10/19/2020 05:18 AM    CHOL/HDL Ratio 4.8 10/19/2020 05:18 AM       Thyroid Function  See Care Everywhere    Vitals  Visit Vitals  /64 (BP Patient Position: Standing)   Pulse (!) 103   Temp 98.5 °F (36.9 °C)   Resp 18   Ht 175.3 cm (69\")   Wt 68 kg (150 lb)   SpO2 97%   BMI 22.15 kg/m²       Inna Beth, PharmD, BCPS  [phone] (pharmacy)

## 2020-10-15 NOTE — GROUP NOTE
SATISH  GROUP DOCUMENTATION INDIVIDUAL Group Therapy Note Date: 10/15/2020 Group Start Time: 1000 Group End Time: 1100 Group Topic: Topic Group [facility] 3 ACUTE BEHAV [facility], [address] GROUP DOCUMENTATION GROUP Group Therapy Note Attendees: 2 Attendance: Attended Patient's Goal:  To participate in relaxation activity Interventions/techniques: Supported-favorite ways to relax Follows Directions: Followed directions with prompts Interactions: Did not interact appropriately Mental Status: Anxious and Preoccupied Behavior/appearance: Needed prompting Goals Achieved: Able to engage in interactions and Able to listen to others Additional Notes: Active participant with prompts,had difficulty sitting still-very short attention span-identified listening to music as a favorite way to relax Flaquito Giron

## 2020-10-15 NOTE — BH NOTES
met with patient face to face for individual counseling session. Pt was manic and restless, moving limbs consistently and unable to sit still. Pt frequently stood up and walked around room during session and stated \"I'm not going to hurt you. I am just trying to get the medicine out of me. \" . Pt speech is rapid and overproductive. Pt easily redirected and cooperative. Pt discharge focused and states he is eager to get back home to be with his family. Pt established spending time with his grandchildren and praying to God as positive coping skills. Pt processed relationship with family members and how each one of them is helpful in his treatment and recovery. Pt abruptly left session stating \"I need to get in the hallway and walk to get this medicine out of me. We can talk later or whenever you want to\".

## 2020-10-15 NOTE — GROUP NOTE
SATISH  GROUP DOCUMENTATION INDIVIDUAL Group Therapy Note Date: 10/15/2020 Group Start Time: 1500 Group End Time: 0648 Group Topic: Recreational/Music Therapy [facility] [place] [address] ACUTE BEHAV [facility], [address] GROUP DOCUMENTATION GROUP Group Therapy Note Attendees: 5 Attendance: Did not attend Patient's Goal: Interventions/techniques: 
 
Morgan Crawley

## 2020-10-15 NOTE — H&P
[address] [facility] [address] HISTORY AND PHYSICAL    Name:  Rakesh Rosario  MR#:  [id number]  :  1960  ACCOUNT #:  [de-identified]  ADMIT DATE:  10/14/2020    PSYCHIATRIC INTAKE NOTE    CHIEF COMPLAINT:  \"I slipped and fell on the road and they thought I was praying in the street. \"    HISTORY OF PRESENT ILLNESS:  This is a 81-year-old  male with a history of bipolar disorder, brought into the emergency room by [facility] Department for clearance. He describes the same story to this interviewer that he has described to the emergency room that he was crossing the street, slipped on some oil and fell. When he got back up and had finished crossing the street, he looked back and felt that he had dropped his lighter, went back into the street to get it. Because someone saw him crossing the street twice, they called the police and told them that he was walking back and forth in traffic, and they brought him to get help. He denies suicidal or homicidal ideations. No auditory or visual hallucinations. He just got out of [facility] where he was put on Depakote and a number of other medications, he says made him felt too jittery and so, he has been off them for 2 weeks now. He says they are still in the system and make him feel funny. He does better without them. He does not want a shot or injection either. He says he is doing fine and just needs to walk; paced around the unit, it helps him feel better. Denies suicidal or homicidal ideations. No auditory or visual hallucinations. He is here for management of his condition. PAST PSYCHIATRIC HISTORY:  Prior hospitalizations for similar reasons of bipolar disorder, hugo, had been on Depakote in the past here, back about 6 years ago at [facility]. PAST MEDICAL HISTORY:  Hernia. ALLERGIES:  NONE KNOWN.     SOCIAL HISTORY:  , but has two children that are grown, on disability, but does occasional work as a . Denies drugs or alcohol, but then uses marijuana daily. He does not see it as a drug. He smokes cigarettes daily also, would like a patch. MENTAL STATUS EXAM:  Adult male, calm, cooperative. Clear, coherent speech of average rate, loud volume, average tone. No aggression or violence. Interactive and aware, somewhat elevated and active in his responses and his animated physical state. Here for management of his condition. DIAGNOSES:  Bipolar disorder, hugo. PLAN:  Admit for safety and stabilization, medication modification as needed, group therapy, individual therapy. ESTIMATED LENGTH OF STAY:  5-7 days. DISPOSITION:  Planning with Social Work. STRENGTH:  Awareness of the process. DISABILITIES:  Noncompliant and he does not want to take medications and substance use. MAR Villarreal MD      [id number]  D:  10/15/2020 12:35  T:  10/15/2020 18:25  JOB #:  [id number]

## 2020-10-15 NOTE — PROGRESS NOTES
1950: Pt in hallway yelling \"Why is everyone so loud\" Writer went over to pt and introduced themselves. Pt began talking about his hernia and lifted his shirt up. The pt has a 2.5 in-3 in hernia sticking out above his umbilical. Writer called on call and put in an order for hospitalist to come tomorrow. Pt refused pain medication. Pt denied AH/VH/SI/HI. Pt stated he is 4/10 pain and wanted to go to sleep right now. PT accepted PRN trazodone. 2030: Pt appears asleep. PRN effective. 0200: Pt complained of dry mouth. Provided pt with water. 0530: Pt complaining of dry mouth. Writer put in an order to on call to see if pt could be provided an oral rinse. Pt believes its the side effects of him taking cogentin and is stating \"I have been woken up 4 times tonight from the dry mouth. It's going all the way down my throat and water is not working\"     0700: Gave off shift report to oncoming nurse. Problem: Falls - Risk of  Goal: *Absence of Falls  Description: Document Saroj Ulloa Fall Risk and appropriate interventions in the flowsheet.   Outcome: Progressing Towards Goal  Note: Fall Risk Interventions:                      History of Falls Interventions: Room close to nurse's station         Problem: Patient Education: Go to Patient Education Activity  Goal: Patient/Family Education  Outcome: Progressing Towards Goal     Problem: Altered Thought Process (Adult/Pediatric)  Goal: *STG: Remains safe in hospital  Outcome: Progressing Towards Goal     Problem: Patient Education: Go to Patient Education Activity  Goal: Patient/Family Education  Outcome: Progressing Towards Goal

## 2020-10-15 NOTE — BH NOTES
0700-Report received from Mayte Bobo RN. 0800- 1000-Patient is alert and very talkative, denies SI/HI states he is not having any auditory or visual halluncations. H had loud outburst states he need to leave this place to go play with his grand kids. He was redirected without difficulties. 1000-1200-Patient continue to ambulate in the hallway and talk to all staff he was given prn medication at this time, see MAR.  [id number]- Patient  Out of room  For lunch  Then back to room  Patient resting quietly at this time. 1400-1600-Patient room was moved for 309 to 316 to make room for new admission. Patient  In no acute distress at this  1600-1800-Patient complaint of  Increased anxiety and was given medication at this time with no apparent relief at this time. Staff will continue to monitor for changes.

## 2020-10-15 NOTE — PROGRESS NOTES
[facility] Admission Pharmacy Medication Reconciliation     Information obtained from: Patient interview, paperwork from [address]: No    Comments/recommendations:    1) Patient discharged from [facility] on 9/30/20 and has been non-compliant with medications since discharge. Patient did not like how the medications made him feel. 2) Medication changes (since last review): Added  All medications listed below   1[employer] pharmacy benefit data reflects medications filled and processed through the patient's insurance, however                this data does NOT capture whether the medication was picked up or is currently being taken by the patient. Total Time Spent:  30 minutes    Patient allergies: Allergies as of 10/14/2020 - Review Complete 10/14/2020   Allergen Reaction Noted    Bee sting [sting, bee] Swelling 10/02/2013       Prior to Admission Medications   Prescriptions Last Dose Informant Patient Reported? Taking?   benztropine (COGENTIN) 1 mg tablet 2 weeks ago Transfer Papers Yes No   Sig: Take 1 mg by mouth daily. Indications: extrapyramidal symptoms as a result of taking the medication   divalproex ER (Depakote ER) 250 mg ER tablet 2 weeks ago Transfer Papers Yes No   Sig: Take 1,250 mg by mouth nightly. Indications: mood disorder   haloperidoL (HALDOL) 10 mg tablet 2 weeks ago Transfer Papers Yes No   Sig: Take 10 mg by mouth daily.  Indications: psychotic disorder      Facility-Administered Medications: None       Thank you,  Panda Arellano, PHARMD, [facility], [address] [place] [address] [address]: [phone] ([id number])  Pharmacy: [phone] ([id number])

## 2020-10-15 NOTE — PROGRESS NOTES
Problem: Falls - Risk of  Goal: *Absence of Falls  Description: Document Keren Gamble Fall Risk and appropriate interventions in the flowsheet.   Outcome: Progressing Towards Goal  Note: Fall Risk Interventions:                      History of Falls Interventions: Room close to nurse's station

## 2020-10-15 NOTE — BH NOTES
PSYCHOSOCIAL ASSESSMENT  :Patient identifying info: Lakshmi Cordova is a 62 y.o., male admitted 10/14/2020 11:16 AM     Presenting problem and precipitating factors: Patient reports he was walking across the street from [place] when he slipped on some oil and fell. He realized he dropped his lighter and turned back into the road to get it. He reports people called and said he was praying, not playing, in the road and they picked him up. Prescreen indicates that patient was in [facility] two weeks ago, stopped taking his medicine due to feeling jittery. He was found laying in the roadway. Stated that he slipped on oil. Was observed talking to himself. Daughter reports he's always out on streets and has stood in traffic before. Aunt says he wanders the streets all the time. Mental status assessment: Patient presented with loud and pressured speech, tense posture, appropriate eye contact, cooperative attitude, euthymic mood, full affect, appropriate thought content. Thought process and perception difficult to assess due to conflicting accounts of precipitating events.     Strengths: supportive family    Collateral information: PEPITO for Aurelio Parra (daughter, [phone]), Theresa Plasencia (aunt, [phone])    Current psychiatric /substance abuse providers and contact info: patient reports he doesn't need medications or services    Previous psychiatric/substance abuse providers and response to treatment: [facility] two weeks ago, history of several hospitalizations    Family history of mental illness or substance abuse: not reported    Substance abuse history:  UDS + THC, BAL<10, smokes cigarettes daily  Social History     Tobacco Use    Smoking status: Former Smoker     Packs/day: 1.00    Smokeless tobacco: Never Used   Substance Use Topics    Alcohol use: No       History of biomedical complications associated with substance abuse : none reported    Patient's current acceptance of treatment or motivation for change: TDO    Family constellation: patient reports he is , has two children (ages 27 and 22)    Is significant other involved? no    Describe support system: patient's primary supports are his daughter and aunt    Describe living arrangements and home environment: patient is currently living with his daughter in [place] but also stays often with an aunt     Health issues: hernia  Hospital Problems  Date Reviewed: 2014          Codes Class Noted POA    Bipolar 1 disorder [facility]) ICD-10-CM: F31.9  ICD-9-CM: 296.7  10/14/2020 Unknown              Trauma history: father passed away recently    Legal issues: charged with hitting someone with a pipe in [place] protests/riots. Patient denies any pending charges.     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

## 2020-10-15 NOTE — INTERDISCIPLINARY ROUNDS
Behavioral Health Interdisciplinary Rounds Patient Name: Melanie Gama  Age: 62 y.o. Room/Bed:  308/01 Primary Diagnosis: <principal problem not specified> Admission Status: TDO Readmission within 30 days: no 
Power of  in place: no 
Patient requires a blocked bed: no           
Reason for blocked bed: n/a Order for blocked bed obtained: no    
 
Sleep hours: 8.5 Morning Labs completed per orders:  no      
Participation in Care/Groups:  no 
Medication Compliant?: Yes PRNS (last 24 hours): Sleep Aid Restraints (last 24 hours):  no 
Substance Abuse:  no   
24 hour chart check complete: no

## 2020-10-15 NOTE — PROGRESS NOTES
Problem: Discharge Planning  Goal: *Discharge to safe environment  Outcome: Progressing Towards Goal  Note: Patient identifies home as a safe environment. Patient will return home upon discharge. Goal: *Knowledge of discharge instructions  Outcome: Progressing Towards Goal  Note: Patient verbalizes understanding of goals for treatment and safe discharge.      Problem: Discharge Planning  Goal: *Knowledge of medication management  Outcome: Not Progressing Towards Goal  Note: Patient is selectively taking medications

## 2020-10-16 PROCEDURE — 65220000003 HC RM SEMIPRIVATE PSYCH

## 2020-10-16 PROCEDURE — 74011250637 HC RX REV CODE- 250/637: Performed by: NURSE PRACTITIONER

## 2020-10-16 PROCEDURE — 74011250637 HC RX REV CODE- 250/637: Performed by: PSYCHIATRY & NEUROLOGY

## 2020-10-16 RX ORDER — OLANZAPINE 5 MG/1
5 TABLET ORAL 2 TIMES DAILY
Status: DISCONTINUED | OUTPATIENT
Start: 2020-10-16 | End: 2020-10-23 | Stop reason: HOSPADM

## 2020-10-16 RX ORDER — HALOPERIDOL 5 MG/ML
5 INJECTION INTRAMUSCULAR 2 TIMES DAILY
Status: DISCONTINUED | OUTPATIENT
Start: 2020-10-16 | End: 2020-10-23 | Stop reason: HOSPADM

## 2020-10-16 RX ORDER — IBUPROFEN 600 MG/1
600 TABLET ORAL
Status: DISCONTINUED | OUTPATIENT
Start: 2020-10-16 | End: 2020-10-23 | Stop reason: HOSPADM

## 2020-10-16 RX ORDER — OXCARBAZEPINE 150 MG/1
150 TABLET, FILM COATED ORAL 2 TIMES DAILY
Status: DISCONTINUED | OUTPATIENT
Start: 2020-10-16 | End: 2020-10-19

## 2020-10-16 RX ADMIN — OXCARBAZEPINE 150 MG: 150 TABLET, FILM COATED ORAL at 16:08

## 2020-10-16 RX ADMIN — IBUPROFEN 600 MG: 600 TABLET, FILM COATED ORAL at 16:08

## 2020-10-16 RX ADMIN — TRAZODONE HYDROCHLORIDE 50 MG: 50 TABLET ORAL at 21:51

## 2020-10-16 RX ADMIN — IBUPROFEN 600 MG: 600 TABLET, FILM COATED ORAL at 22:15

## 2020-10-16 RX ADMIN — OLANZAPINE 5 MG: 5 TABLET, FILM COATED ORAL at 06:42

## 2020-10-16 RX ADMIN — OLANZAPINE 5 MG: 5 TABLET, FILM COATED ORAL at 12:27

## 2020-10-16 RX ADMIN — HYDROXYZINE HYDROCHLORIDE 50 MG: 25 TABLET, FILM COATED ORAL at 12:27

## 2020-10-16 RX ADMIN — HYDROXYZINE HYDROCHLORIDE 50 MG: 25 TABLET, FILM COATED ORAL at 06:42

## 2020-10-16 RX ADMIN — BENZTROPINE MESYLATE 1 MG: 1 TABLET ORAL at 21:52

## 2020-10-16 RX ADMIN — ACETAMINOPHEN 650 MG: 325 TABLET, FILM COATED ORAL at 14:11

## 2020-10-16 RX ADMIN — ACETAMINOPHEN 650 MG: 325 TABLET, FILM COATED ORAL at 09:43

## 2020-10-16 RX ADMIN — BENZTROPINE MESYLATE 1 MG: 1 TABLET ORAL at 10:10

## 2020-10-16 RX ADMIN — OLANZAPINE 5 MG: 5 TABLET, FILM COATED ORAL at 16:08

## 2020-10-16 RX ADMIN — OLANZAPINE 5 MG: 5 TABLET, FILM COATED ORAL at 21:50

## 2020-10-16 NOTE — INTERDISCIPLINARY ROUNDS
[facility] Interdisciplinary Rounds Patient Name: Brittny Holder  Age: 62 y.o. Room/Bed:  316/02 Primary Diagnosis: <principal problem not specified> Admission Status: TDO Readmission within 30 days: no 
Power of  in place: unk Patient requires a blocked bed: no           
Reason for blocked bed: n/a Order for blocked bed obtained: no    
 
Sleep hours: 6 Morning Labs completed per orders:  na    
Participation in Care/Groups:  Yes, varies Medication Compliant?: Refusing Psychiatric Medications, Depakote ER 
PRNS (last 24 hours): Antianxiety, Sleep Aid and Pain Restraints (last 24 hours):  no 
Substance Abuse:  yes 24 hour chart check complete:   
 
 
Behavioral Health Treatment Team Note Patient goal(s) for today: continue taking meds as prescribe, engage in unit activities, participate in hygiene/ADLs, prepare for discharge, follow directions from staff, contact support team 
Treatment team focus/goals: continue adjusting meds as needed, discharge planning, update support system Progress note: patient reported that he is taking some medication but is worried about some causing side effects. Patient presented with pressured, loud speech and was often intrusive in others' conversations. He continues to shift from foot to foot and states that it's from past medications. Patient wants to go home today and was provided with education regarding court processes. Patient willing to transition to general side of unit. Patient engaging in groups and speaks respectfully with other patients and staff. Patient reports he will stay through the weekend and see how he does. LOS:  2  Expected LOS: TBD Insurance info/prescription coverage:  VA Medicare (Part A&B to be confirmed by Pt Access) Date of last family contact:  PEPITO Skyler Shahid (daughter, [phone]), Kelly Morales (aunt, [phone]) Family requesting physician contact today:  no 
 Discharge plan:  Patient plans to return to his daughter's home Guns in the home:  no Outpatient provider(s):  [facility] Participating treatment team members:  WILMER Bean, Dr. Juan Antonio Napier MD

## 2020-10-16 NOTE — BH NOTES
0700  Received report from [facility], RN. Assumed care of the patient. Pt ambulating in hallway upon assessment. Pt manic and hyper verbal.  Pt stated he is doing wonderful and denies anxiety/depression/SI/HI/AVH. Pt endorses lower back pain and bilateral foot pain both 6/10, requesting tylenol. Last bm was yesterday.     0800  Pt in day room for breakfast    0845  Pt sleeping at this time so held nicotine patch    0943  Pt received nicotine patch at this time and prn tylenol as requested     1200  Pt in day room for lunch    1227  Pt received prn zyprexa and atrax at this time    1411  Pt received tylenol for lbp 6/10

## 2020-10-16 NOTE — GROUP NOTE
SATISH  GROUP DOCUMENTATION INDIVIDUAL Group Therapy Note Date: 10/16/2020 Group Start Time: 1100 Group End Time: 1200 Group Topic: Topic Group [facility] 3 ACUTE BEHAV [facility], [address] [facility] [place] GROUP DOCUMENTATION GROUP Group Therapy Note Attendees: 6 Attendance: Attended Patient's Goal:  To participate in relaxation activity Interventions/techniques: Supported-music Follows Directions: Did not follow directions Interactions: Did not interact appropriately Mental Status: Anxious and Preoccupied Behavior/appearance: Needed prompting Goals Achieved: Additional Notes:  Unable to sit still-disorganized thoughts-left session early Silvia Tavarez

## 2020-10-16 NOTE — GROUP NOTE
SATISH  GROUP DOCUMENTATION INDIVIDUAL Group Therapy Note Date: 10/16/2020 Group Start Time: 0900 Group End Time: 1000 Group Topic: Topic Group [facility] 3 ACUTE BEHAV [facility], [address] GROUP DOCUMENTATION GROUP Group Therapy Note Attendees: 2 Attendance: Attended Patient's Goal:  To identify positive words to live by Interventions/techniques: Supported-worksheet Follows Directions: Did not follow directions Interactions: Did not interact appropriately Mental Status: Anxious and Manic Behavior/appearance: Needed prompting Goals Achieved: able to engage Additional Notes:  Disorganized thoughts-anxious,short attention span,hyper verbal 
 
Ritchie Grider

## 2020-10-16 NOTE — GROUP NOTE
SATISH  GROUP DOCUMENTATION INDIVIDUAL Group Therapy Note Date: 10/16/2020 Group Start Time: 1500 Group End Time: 4951 Group Topic: Recreational/Music Therapy [facility] [place] [address] ACUTE BEHAV [facility], [address] GROUP DOCUMENTATION GROUP Group Therapy Note Attendees: 8 Attendance: Attended Patient's Goal:  To concentrate on selected task Interventions/techniques: Supported-crafts,games,music Follows Directions: Followed directions with prompts Interactions: Did not interact appropriately Mental Status: Anxious and Manic Behavior/appearance: Cooperative and Needed prompting Goals Achieved: Able to engage in interactions and Able to listen to others Additional Notes:  Anxious-has difficulty sitting still- did actively participate in [employer] game Sudha Jordan

## 2020-10-16 NOTE — BH NOTES
GROUP THERAPY PROGRESS NOTE    Patient is participating in Discharge Group. Group time: 30 minutes    Personal goal for participation: Process feelings related to discharge and/or feelings/goals for today. Goal orientation: Personal    Group therapy participation: minimal    Therapeutic interventions reviewed and discussed: Group discussion was focused on discharge plans and anxiety related to this. Group members discussed what they planned to do once discharge and discharge plans. Patients discussed their goals for today as well as the weekend and what they are working on with treatment team to get ready for discharge. Impression of participation: Zenobia Silvestre did attend group today. He appeared anxious and manic. He was polite with staff. He was discharge focused and his goal for the day matched accordingly. He asked questions about the TDO process and if he would be released today. He was advised that it would be up to the courts, which he would be meeting with shortly today. He was extremely fidgety in his chair and after several minutes did get up and walked out of the room. He did return and started speaking about Jew from a standing position in the doorway of the room. He then left again and returned once more towards the end of group, continuing to focus on the possibility of his discharge.       Jason Winston RN, PMHNP Student

## 2020-10-16 NOTE — PROGRESS NOTES
Problem: Discharge Planning  Goal: *Discharge to safe environment  Outcome: Progressing Towards Goal  Note: Patient identifies home as a safe environment. Patient will return home upon discharge. Goal: *Knowledge of discharge instructions  Outcome: Progressing Towards Goal  Note: Patient verbalizes understanding of goals for treatment and safe discharge. Problem: Discharge Planning  Goal: *Knowledge of medication management  Outcome: Not Progressing Towards Goal  Note: Patient is selectively taking medications.

## 2020-10-16 NOTE — BH NOTES
[employer] conducted a virtual TDO hearing this morning. The ending result of hearing, patient,  Committed.

## 2020-10-16 NOTE — BH NOTES
Psychiatric Progress Note    Patient: Pola Corado MRN: [id number]  SSN: [id number]    YOB: 1960  Age: 62 y.o. Sex: male      Admit Date: 10/14/2020    LOS: 2 days     Subjective: Pola Corado  reports feeling fine and moods are good. Wants to leave and does not like medicines except atarax. Denies SI/HI/AH/VH. No aggression or violence. Appropriately interactive and aware. Tolerating medications well. Eating well and sleeping well.     Objective:     Vitals:    10/14/20 2055 10/15/20 0851 10/15/20 1910 10/16/20 0735   BP: 117/73 107/64 109/74 119/72   Pulse: 70 81 79 83   Resp: 18 18 18 20   Temp: 98.7 °F (37.1 °C) 97.8 °F (36.6 °C) 97.4 °F (36.3 °C) 97.8 °F (36.6 °C)   SpO2: 99%   99%   Weight:       Height:            Mental Status Exam:   Sensorium  oriented to time, place and person   Relations cooperative   Eye Contact appropriate   Appearance:  age appropriate   Speech:  normal volume and non-pressured   Thought Process: goal directed   Thought Content free of delusions and free of hallucinations   Suicidal ideations none   Mood:  depressed   Affect:  depressed   Memory   adequate   Concentration:  adequate   Insight:  limited   Judgment:  limited       MEDICATIONS:  Current Facility-Administered Medications   Medication Dose Route Frequency    divalproex ER (DEPAKOTE ER) 24 hour tablet 1,000 mg  1,000 mg Oral QHS    OLANZapine (ZyPREXA) tablet 5 mg  5 mg Oral Q6H PRN    haloperidol lactate (HALDOL) injection 5 mg  5 mg IntraMUSCular Q6H PRN    benztropine (COGENTIN) tablet 1 mg  1 mg Oral BID PRN    diphenhydrAMINE (BENADRYL) injection 50 mg  50 mg IntraMUSCular BID PRN    hydrOXYzine HCL (ATARAX) tablet 50 mg  50 mg Oral TID PRN    LORazepam (ATIVAN) injection 1 mg  1 mg IntraMUSCular Q4H PRN    traZODone (DESYREL) tablet 50 mg  50 mg Oral QHS PRN    acetaminophen (TYLENOL) tablet 650 mg  650 mg Oral Q4H PRN    magnesium hydroxide (MILK OF MAGNESIA) 400 mg/5 mL oral suspension 30 mL  30 mL Oral DAILY PRN    nicotine (NICODERM CQ) 21 mg/24 hr patch 1 Patch  1 Patch TransDERmal DAILY      DISCUSSION:   the risks and benefits of the proposed medication  patient given opportunity to ask questions    Lab/Data Review: All lab results for the last 24 hours reviewed. No results found for this or any previous visit (from the past 24 hour(s)).       Assessment:     Principal Problem:    Bipolar 1 disorder ([facility] [address] (10/14/2020)        Plan:     Continue current care  Collateral information  Disposition planning with social work    Signed By: Jami Garsia MD     October 16, 2020

## 2020-10-16 NOTE — BH NOTES
Assumed nursing care of Nayely Pham after receiving the 1900 change of shift report. Nayely Pham presented with manic behavior AEB pacing up and down the hallway, in and out of his room, hyperverbal with a loud tone of voice. He was also easily irritated. Pt stated, \"I can't stop walking, I try to relax and have to pace again. \"  Pt was in the hallway just talking to self or to anyone and from subject to subject and stated, \"They think I'm a street preacher. \"  Pt kept expressing a need to sleep, stated he hasn't slept in days. He received Zyprexa 5mg po at 2007 for agitation and Trazodone 50mg po at 2007 for sleep since he wanted to go to bed early. Pt did sleep from around 2040 to 2330. He then awakened in an irritable mood and started going in and out of his room and up and down the hallway He would stop and start talking to another peer in their room . The dayroom was opened for him  He c/o lower back pain, rated 6 on 0-10 pain scale and received Tylenol 650mg po at 2358 for pain. He stated he cannot sleep due to his roommate snoring so was allowed to sleep in the seclusion area. Monitoring closely for mood chgs, behavior and for safety. Addendum to the above note:  Nayely Pham refused to take his HS Depakote ER medication in an adamant tone of voice. Stated he will never take it. Addendum at 9730: Nayely Pham has had around 6 hrs of interrupted sleep. He is currently awake, hyperverbal and pacing in the hallway. Addendum at 0645: Nayely Pham has been escalating with increased pressured speech. Pt stated, \"I cannot stop talking, there is so much to say! \"  He is pacing constantly and arguing with staff. He received Atarax 50mg po at 0642 for anxiety and Zyprexa 5mg po for increased agitation at 0642.

## 2020-10-16 NOTE — PROGRESS NOTES
Problem: Falls - Risk of  Goal: *Absence of Falls  Description: Document Emanuel Wahl Fall Risk and appropriate interventions in the flowsheet.   Outcome: Progressing Towards Goal  Note: Fall Risk Interventions:                      History of Falls Interventions: Room close to nurse's station         Problem: Altered Thought Process (Adult/Pediatric)  Goal: *STG: Remains safe in hospital  Outcome: Progressing Towards Goal  Goal: *STG: Complies with medication therapy  Outcome: Progressing Towards Goal  Goal: *LTG: Returns to baseline functioning  Outcome: Progressing Towards Goal

## 2020-10-16 NOTE — PROGRESS NOTES
Problem: Falls - Risk of  Goal: *Absence of Falls  Description: Document Vero Saavedra Fall Risk and appropriate interventions in the flowsheet.   Outcome: Progressing Towards Goal  Note: Fall Risk Interventions:                      History of Falls Interventions: Room close to nurse's station         Problem: Altered Thought Process (Adult/Pediatric)  Goal: *STG: Remains safe in hospital  Outcome: Progressing Towards Goal  Goal: *LTG: Returns to baseline functioning  Outcome: Not Progressing Towards Goal

## 2020-10-17 PROCEDURE — 74011250637 HC RX REV CODE- 250/637: Performed by: NURSE PRACTITIONER

## 2020-10-17 PROCEDURE — 74011250637 HC RX REV CODE- 250/637: Performed by: PSYCHIATRY & NEUROLOGY

## 2020-10-17 PROCEDURE — 65220000003 HC RM SEMIPRIVATE PSYCH

## 2020-10-17 PROCEDURE — 74011250636 HC RX REV CODE- 250/636: Performed by: NURSE PRACTITIONER

## 2020-10-17 RX ADMIN — LORAZEPAM 1 MG: 2 INJECTION, SOLUTION INTRAMUSCULAR; INTRAVENOUS at 14:15

## 2020-10-17 RX ADMIN — OLANZAPINE 5 MG: 5 TABLET, FILM COATED ORAL at 09:35

## 2020-10-17 RX ADMIN — OXCARBAZEPINE 150 MG: 150 TABLET, FILM COATED ORAL at 17:52

## 2020-10-17 RX ADMIN — HYDROXYZINE HYDROCHLORIDE 50 MG: 25 TABLET, FILM COATED ORAL at 01:33

## 2020-10-17 RX ADMIN — HALOPERIDOL LACTATE 5 MG: 5 INJECTION, SOLUTION INTRAMUSCULAR at 14:15

## 2020-10-17 RX ADMIN — ACETAMINOPHEN 650 MG: 325 TABLET, FILM COATED ORAL at 01:07

## 2020-10-17 RX ADMIN — OXCARBAZEPINE 150 MG: 150 TABLET, FILM COATED ORAL at 09:35

## 2020-10-17 RX ADMIN — OLANZAPINE 5 MG: 5 TABLET, FILM COATED ORAL at 17:52

## 2020-10-17 RX ADMIN — HYDROXYZINE HYDROCHLORIDE 50 MG: 25 TABLET, FILM COATED ORAL at 11:01

## 2020-10-17 RX ADMIN — IBUPROFEN 600 MG: 600 TABLET, FILM COATED ORAL at 04:51

## 2020-10-17 RX ADMIN — IBUPROFEN 600 MG: 600 TABLET, FILM COATED ORAL at 11:01

## 2020-10-17 RX ADMIN — OLANZAPINE 5 MG: 5 TABLET, FILM COATED ORAL at 04:51

## 2020-10-17 NOTE — BH NOTES
0700  Received report from RT RN. Assumed care of the patient. Pt ambulating in hallway upon assessment. Pt continues to be manic, loud, and intrusive but redirectable at this time. Pt currently denies SI/HI/AVH/anxiety/depression/pain. Last bm was yesterday.     0835  Pt in day room for breakfast    0935  Pt initially became loud and angry about court ordered medications but eventually agreed to take them    1101  Pt given prn atarax and motrin per request; c/o bilateral foot pain 7/10    1215  Pt in day room for lunch; pt hyper verbal and yelling loudly at this time    1410  Pt got into a verbal altercation with peer; pt with increasing agitation and aggression at this time; pt and peer threatening each other    1415  Pt given IM haldol and ativan at this time    1715  Pt in day room for dinner    1752  Pt compliant with evening meds

## 2020-10-17 NOTE — PROGRESS NOTES
Problem: Altered Thought Process (Adult/Pediatric)  Goal: *STG: Complies with medication therapy  Outcome: Progressing Towards Goal  Goal: *STG: Absence of lethality  Outcome: Progressing Towards Goal

## 2020-10-17 NOTE — INTERDISCIPLINARY ROUNDS
Behavioral Health Interdisciplinary Rounds Patient Name: Sunil Perrin  Age: 62 y.o. Room/Bed:  316/02 Primary [facility]: [facility] Admission Status: Involuntary Commitment and Forced Medication Order Readmission within 30 days: no 
Power of  in place: no 
Patient requires a blocked bed: no           
Reason for blocked bed: n/a Order for blocked bed obtained: no    
 
Sleep hours:10 Morning Labs completed per orders:  refused Participation in Care/Groups:  yes Medication Compliant?: Yes PRNS (last 24 hours): antipsychotic, antianxiety Restraints (last 24 hours):  no 
Substance Abuse:  yes 24 hour chart check complete: yes

## 2020-10-17 NOTE — GROUP NOTE
SAT[facility]  GROUP DOCUMENTATION INDIVIDUAL Group Therapy Note Date: 10/16/2020 Group Start Time: 2030 Group End Time: 2055 Group Topic: Reflection/Relaxation [facility] [place] [address] ACUTE BEHAV TH NICOLE Reynoso  GROUP DOCUMENTATION GROUP Group Therapy Note Attendees: There were 4 people that attended out of 11 patients on the acute side. Attendance: Attended Patient's Goal:  Reflection,Art, Aromatherapy and deep breathing exercise. Interventions/techniques: Art integration, Promoted peer support and Provide feedback Follows Directions: Followed directions Interactions: Disorganized interaction Mental Status: Anxious, Elevated and Manic Behavior/appearance: Cooperative and Needed prompting Goals Achieved: Able to engage in interactions, Able to give feedback to another, Able to reflect/comment on own behavior, Able to receive feedback, Able to experience relief/decrease in symptoms, Discussed coping, Discussed self-esteem issues, Increased hopefulness and Verbalized increased hopefulness Additional Notes:  Manic but able to follow directions and decrease in Anxiety.  
 
Pat Klein RN

## 2020-10-17 NOTE — BH NOTES
Psychiatric Progress Note    Patient: Trevor Zavaleta MRN: [id number]  SSN: [id number]    YOB: 1960  Age: 62 y.o. Sex: male      Admit Date: 10/14/2020    LOS: 3 days     Subjective: Trevor Zavaleta  reports feeling fine and moods are good. Wants to leave and does not like medicines except atarax. Denies SI/HI/AH/VH. No aggression or violence. Pacing up and down hallways all day, continuously talking, screaming and singing up and down hallways. Has to be consistently redirected by staff. Refusing PRN medications.     Objective:     Vitals:    10/15/20 1910 10/16/20 0735 10/16/20 2054 10/17/20 0800   BP: 109/74 119/72 131/78 (!) 141/95   Pulse: 79 83 86 88   Resp: 18 20 16 20   Temp: 97.4 °F (36.3 °C) 97.8 °F (36.6 °C) 97.8 °F (36.6 °C) 98.6 °F (37 °C)   SpO2:  99% 99% 100%   Weight:       Height:            Mental Status Exam:   Sensorium  oriented to time, place and person   Relations cooperative   Eye Contact appropriate   Appearance:  age appropriate   Speech:  Loud, hyper-verbal, increased rate and rhythm   Thought Process: Flight of ideas , tangential   Thought Content free of delusions and free of hallucinations   Suicidal ideations none   Mood:  manic   Affect:  Mood congruent   Memory   adequate   Concentration:  adequate   Insight:  limited   Judgment:  limited       MEDICATIONS:  Current Facility-Administered Medications   Medication Dose Route Frequency    OLANZapine (ZyPREXA) tablet 5 mg  5 mg Oral BID    Or    haloperidol lactate (HALDOL) injection 5 mg +++COURT ORDERED MEDICATION FOR REFUSAL OF OLANZAPINE+++  5 mg IntraMUSCular BID    OXcarbazepine (TRILEPTAL) tablet 150 mg  150 mg Oral BID    ibuprofen (MOTRIN) tablet 600 mg  600 mg Oral Q6H PRN    OLANZapine (ZyPREXA) tablet 5 mg  5 mg Oral Q6H PRN    haloperidol lactate (HALDOL) injection 5 mg  5 mg IntraMUSCular Q6H PRN    benztropine (COGENTIN) tablet 1 mg  1 mg Oral BID PRN    diphenhydrAMINE (BENADRYL) injection 50 mg  50 mg IntraMUSCular BID PRN    hydrOXYzine HCL (ATARAX) tablet 50 mg  50 mg Oral TID PRN    LORazepam (ATIVAN) injection 1 mg  1 mg IntraMUSCular Q4H PRN    traZODone (DESYREL) tablet 50 mg  50 mg Oral QHS PRN    acetaminophen (TYLENOL) tablet 650 mg  650 mg Oral Q4H PRN    magnesium hydroxide (MILK OF MAGNESIA) 400 mg/5 mL oral suspension 30 mL  30 mL Oral DAILY PRN    nicotine (NICODERM CQ) 21 mg/24 hr patch 1 Patch  1 Patch TransDERmal DAILY      DISCUSSION:   the risks and benefits of the proposed medication  patient given opportunity to ask questions    Lab/Data Review: All lab results for the last 24 hours reviewed. No results found for this or any previous visit (from the past 24 hour(s)).       Assessment:     Principal Problem:    Bipolar 1 disorder (Karlie Wahl) (10/14/2020)        Plan:     Continue current care  Collateral information  Disposition planning with social work    Signed By: Danette Schreiber NP     October 17, 2020

## 2020-10-17 NOTE — PROGRESS NOTES
2000: Assumed care of patient after receiving shift report from outgoing nurse. \"Carlos\" is disruptive as evidenced by pacing in ramirez yelling at staff and peers and demanding to leave. He was approached and he became quieter when staff engaged and encouraged him to lower his voice somewhat for the wellbeing of his peers. 2300: Kelly Francois was cooperative and attempted to follow directions throughout the evening. Affect has range, mood is anxious and labile. He remains loud at times but is redirectable and was especially responsive when a peer was covering ears and having a difficulty time coping. He was educated about how staff and patients can work together to create a therapeutic milieu and he was overheard instructing another loud patient in the same technique. Hygiene is adequate, independent in ADLs. Denies SI/HI. Denies hallucinations at present. He frequently references common sayings and exhibits some poor boundaries commenting on female staff's body or personal life. At 952 he was giving zyprexa, trazodone and cogentin. He states he has restlessness in that he cannot keep feet still. He c/o foot pain and showed staff blisters on B feet. Encouraged elevation on feet in bed. He states last night he slept in \"isolation room\" with door open and requests to do so again. Kelly Francois was assisted to elevate legs in bed in seclusion room with door open per his request where he is currently resting with even and unlabored respirations. Will continue to monitor pt with q 15 min checks. 0110: Tylenol given per request for foot pain. Offered atarax and Kelly Francois is pacing and hyperverbal but he declines stating he will watch some tv and then maybe go lie down. Will continue to monitor. 0144: Kelly Francois is agitated and mildly disruptive as evidenced by pacing in the ramirez, rapping songs, and being intrusive with staff. He remains redirectable but less so than earlier tonight. Atarax prn given.  Allowed him to listen to music and walk in ramirez with staff. 0150: Mariann Georges is delusional discussing that he has had 16 voices in his lifetime and his voice will change tomorrow. He becomes serious and states that he is lucifer, then says that he just knows everything lucifer knows. Music changed from rock that he chose to relaxing water sounds that staff requests. 0215: Patient is laying down in seclusion room per his request with door open. Respirations even and unlabored. 0400: Carlos briefly out to hallway. Then returns to seclusion room with door open and lays down. 0451: Patient c/o foot pain and received ibuprofen. He is agitated and intrusive with another patient in crisis. Not following directions. Zyprexa po given. 0530: Mariann Georges declines to have labs drawn stating, Lydia Winston took 5 vials already. No more. \"     5636: Mariann Georges is resting in bed with even respirations. Will continue to monitor.        Problem: Manic Behavior (Adult/Pediatric)  Goal: *STG: Demonstrates improvement in thought process and speech pattern  Outcome: Progressing Towards Goal  Goal: *STG: Maintains appropriate boundaries  Outcome: Progressing Towards Goal  Goal: *STG: Demonstrates improvement in sleep pattern  Outcome: Progressing Towards Goal  Goal: *STG: Attends activities and groups  Outcome: Progressing Towards Goal

## 2020-10-18 PROCEDURE — 65220000003 HC RM SEMIPRIVATE PSYCH

## 2020-10-18 PROCEDURE — 74011250637 HC RX REV CODE- 250/637: Performed by: PSYCHIATRY & NEUROLOGY

## 2020-10-18 PROCEDURE — 74011250637 HC RX REV CODE- 250/637: Performed by: NURSE PRACTITIONER

## 2020-10-18 RX ADMIN — BENZTROPINE MESYLATE 1 MG: 1 TABLET ORAL at 09:31

## 2020-10-18 RX ADMIN — IBUPROFEN 600 MG: 600 TABLET, FILM COATED ORAL at 14:27

## 2020-10-18 RX ADMIN — OXCARBAZEPINE 150 MG: 150 TABLET, FILM COATED ORAL at 17:57

## 2020-10-18 RX ADMIN — OLANZAPINE 5 MG: 5 TABLET, FILM COATED ORAL at 12:17

## 2020-10-18 RX ADMIN — IBUPROFEN 600 MG: 600 TABLET, FILM COATED ORAL at 09:30

## 2020-10-18 RX ADMIN — OXCARBAZEPINE 150 MG: 150 TABLET, FILM COATED ORAL at 09:00

## 2020-10-18 RX ADMIN — OLANZAPINE 5 MG: 5 TABLET, FILM COATED ORAL at 17:57

## 2020-10-18 RX ADMIN — OLANZAPINE 5 MG: 5 TABLET, FILM COATED ORAL at 09:00

## 2020-10-18 RX ADMIN — HYDROXYZINE HYDROCHLORIDE 50 MG: 25 TABLET, FILM COATED ORAL at 15:24

## 2020-10-18 RX ADMIN — HYDROXYZINE HYDROCHLORIDE 50 MG: 25 TABLET, FILM COATED ORAL at 09:00

## 2020-10-18 NOTE — PROGRESS NOTES
Problem: Falls - Risk of  Goal: *Absence of Falls  Description: Document Brad Oneill Fall Risk and appropriate interventions in the flowsheet.   Outcome: Progressing Towards Goal  Note: Fall Risk Intervention: gripper socks, Q 15 min  Problem: Altered Thought Process (Adult/Pediatric)  Goal: *STG: Participates in treatment plan  Outcome: Progressing Towards Goal  Goal: *STG: Remains safe in hospital  Outcome: Progressing Towards Goal  Goal: *STG: Complies with medication therapy  Outcome: Progressing Towards Goal  Goal: *STG: Absence of lethality  Outcome: Progressing Towards Goal

## 2020-10-18 NOTE — PROGRESS NOTES
2000: Assumed care of patient after receiving shift report from outgoing nurse. BP is low at 93/48. Patient is sleeping in bed. Arouses to voice but remains drowsy. Encouraged fluids but he only takes a few sips. Will monitor. 2113: BP remains 93/65. He is able to take a few more sips of juice but then states, \"that's enough. \" Discussed that he may be dizzy or lightheaded if he tried to get up and educated patient on need to wait for staff and arise slowly. He agrees but only minutes later is observed walking in ramirez with steady gait. 0000: /66. Will monitor. 0145: Patient up to day room with steady gait. Ate snack and returned to bed    0230: Asleep in bed with even respirations. 12: Patient declined blood draw again this morning stating, \"No\" and drawing arm under cover. [id number] Safe remains asleep in bed with even respirations. He was up a few times this evening and walked in the ramirez and then returned to bed.  Total hours slept 10 +    Problem: Altered Thought Process (Adult/Pediatric)  Goal: *STG: Demonstrates ability to understand and use improved judgment in daily activities and relationships  Outcome: Progressing Towards Goal

## 2020-10-18 NOTE — BH NOTES
Psychiatric Progress Note    Patient: Omer Flores MRN: [id number]  SSN: [id number]    YOB: 1960  Age: 62 y.o. Sex: male      Admit Date: 10/14/2020    LOS: 4 days     Subjective:     10/18-  Omer Flores  reports feeling fine and moods are good. He appears much more calm today and has gone to his room to sleep. Denies SI/HI/AH/VH. No aggression or violence. Pacing up and down hallways some this morning with talking and singing. Receiveid PRN medications last night.      Objective:     Vitals:    10/17/20 2000 10/17/20 2113 10/18/20 0000 10/18/20 0811   BP: (!) 93/48 93/65 116/66 124/82   Pulse: 60 (!) 57  93   Resp: 20 20 18   Temp: 97.6 °F (36.4 °C)   97.9 °F (36.6 °C)   SpO2:    100%   Weight:       Height:            Mental Status Exam:   Sensorium  oriented to time, place and person   Relations cooperative   Eye Contact appropriate   Appearance:  age appropriate   Speech:  Loud, hyper-verbal, increased rate and rhythm   Thought Process: Flight of ideas , tangential   Thought Content free of delusions and free of hallucinations   Suicidal ideations none   Mood:  manic   Affect:  Mood congruent   Memory   adequate   Concentration:  adequate   Insight:  limited   Judgment:  limited       MEDICATIONS:  Current Facility-Administered Medications   Medication Dose Route Frequency    OLANZapine (ZyPREXA) tablet 5 mg  5 mg Oral BID    Or    haloperidol lactate (HALDOL) injection 5 mg +++COURT ORDERED MEDICATION FOR REFUSAL OF OLANZAPINE+++  5 mg IntraMUSCular BID    OXcarbazepine (TRILEPTAL) tablet 150 mg  150 mg Oral BID    ibuprofen (MOTRIN) tablet 600 mg  600 mg Oral Q6H PRN    OLANZapine (ZyPREXA) tablet 5 mg  5 mg Oral Q6H PRN    haloperidol lactate (HALDOL) injection 5 mg  5 mg IntraMUSCular Q6H PRN    benztropine (COGENTIN) tablet 1 mg  1 mg Oral BID PRN    diphenhydrAMINE (BENADRYL) injection 50 mg  50 mg IntraMUSCular BID PRN    hydrOXYzine HCL (ATARAX) tablet 50 mg 50 mg Oral TID PRN    LORazepam (ATIVAN) injection 1 mg  1 mg IntraMUSCular Q4H PRN    traZODone (DESYREL) tablet 50 mg  50 mg Oral QHS PRN    acetaminophen (TYLENOL) tablet 650 mg  650 mg Oral Q4H PRN    magnesium hydroxide (MILK OF MAGNESIA) 400 mg/5 mL oral suspension 30 mL  30 mL Oral DAILY PRN    nicotine (NICODERM CQ) 21 mg/24 hr patch 1 Patch  1 Patch TransDERmal DAILY      DISCUSSION:   the risks and benefits of the proposed medication  patient given opportunity to ask questions    Lab/Data Review: All lab results for the last 24 hours reviewed. No results found for this or any previous visit (from the past 24 hour(s)).       Assessment:     Principal Problem:    Bipolar 1 disorder ([facility] (10/14/2020)        Plan:     Continue current care  Collateral information  Disposition planning with social work    Signed By: Juvencio Anne NP     October 18, 2020

## 2020-10-19 LAB
CHOLEST SERPL-MCNC: 195 MG/DL
EST. AVERAGE GLUCOSE BLD GHB EST-MCNC: 114 MG/DL
HBA1C MFR BLD: 5.6 % (ref 4–5.6)
HDLC SERPL-MCNC: 41 MG/DL
HDLC SERPL: 4.8 {RATIO} (ref 0–5)
LDLC SERPL CALC-MCNC: 85 MG/DL (ref 0–100)
LIPID PROFILE,FLP: ABNORMAL
TRIGL SERPL-MCNC: 345 MG/DL (ref ?–150)
VLDLC SERPL CALC-MCNC: 69 MG/DL

## 2020-10-19 PROCEDURE — 80061 LIPID PANEL: CPT

## 2020-10-19 PROCEDURE — 36415 COLL VENOUS BLD VENIPUNCTURE: CPT

## 2020-10-19 PROCEDURE — 65220000003 HC RM SEMIPRIVATE PSYCH

## 2020-10-19 PROCEDURE — 83036 HEMOGLOBIN GLYCOSYLATED A1C: CPT

## 2020-10-19 PROCEDURE — 74011250637 HC RX REV CODE- 250/637: Performed by: PSYCHIATRY & NEUROLOGY

## 2020-10-19 PROCEDURE — 74011250636 HC RX REV CODE- 250/636: Performed by: NURSE PRACTITIONER

## 2020-10-19 PROCEDURE — 74011250637 HC RX REV CODE- 250/637: Performed by: NURSE PRACTITIONER

## 2020-10-19 RX ORDER — OXCARBAZEPINE 150 MG/1
300 TABLET, FILM COATED ORAL 2 TIMES DAILY
Status: DISCONTINUED | OUTPATIENT
Start: 2020-10-19 | End: 2020-10-23 | Stop reason: HOSPADM

## 2020-10-19 RX ADMIN — HYDROXYZINE HYDROCHLORIDE 50 MG: 25 TABLET, FILM COATED ORAL at 04:48

## 2020-10-19 RX ADMIN — ACETAMINOPHEN 650 MG: 325 TABLET, FILM COATED ORAL at 09:55

## 2020-10-19 RX ADMIN — LORAZEPAM 1 MG: 2 INJECTION, SOLUTION INTRAMUSCULAR; INTRAVENOUS at 11:11

## 2020-10-19 RX ADMIN — IBUPROFEN 600 MG: 600 TABLET, FILM COATED ORAL at 17:13

## 2020-10-19 RX ADMIN — OXCARBAZEPINE 300 MG: 150 TABLET, FILM COATED ORAL at 17:40

## 2020-10-19 RX ADMIN — ACETAMINOPHEN 650 MG: 325 TABLET, FILM COATED ORAL at 17:42

## 2020-10-19 RX ADMIN — BENZTROPINE MESYLATE 1 MG: 1 TABLET ORAL at 04:48

## 2020-10-19 RX ADMIN — DIPHENHYDRAMINE HYDROCHLORIDE 50 MG: 50 INJECTION, SOLUTION INTRAMUSCULAR; INTRAVENOUS at 11:11

## 2020-10-19 RX ADMIN — OXCARBAZEPINE 150 MG: 150 TABLET, FILM COATED ORAL at 09:19

## 2020-10-19 RX ADMIN — OLANZAPINE 5 MG: 5 TABLET, FILM COATED ORAL at 09:20

## 2020-10-19 RX ADMIN — BENZTROPINE MESYLATE 1 MG: 1 TABLET ORAL at 09:19

## 2020-10-19 RX ADMIN — OLANZAPINE 5 MG: 5 TABLET, FILM COATED ORAL at 17:41

## 2020-10-19 RX ADMIN — IBUPROFEN 600 MG: 600 TABLET, FILM COATED ORAL at 09:55

## 2020-10-19 RX ADMIN — HALOPERIDOL LACTATE 5 MG: 5 INJECTION, SOLUTION INTRAMUSCULAR at 11:11

## 2020-10-19 NOTE — BH NOTES
Assumed nursing care of Jeff White after receiving the 1900 change of shift report. Jeff White was withdrawn during much of the shift, stayed in his bed, mostly sleeping. When approached at bedside to take his vital signs, pt refused and angrily responded, 'Hell no! \"  He wanted nurse out of his room so was unable to complete a good assessment. At 0410 pt awakened, walked down the hallway to the exit door, punched on the exit pad before going back to his room. He then showered, asked for a drink and began to pace in and out of his room and up and down the hallway, hyperverbal and talking to available staff. He also is very restless and cannot keep his legs still when standing. He received Cogentin 1 mg po at 0448 per his request and Atarax 50 mg po for c/o anxiety. Monitoring for mood chgs, behavior and for safety    0525:  Lab drawn x 1 attempt. Tolerated well but kept stating he was doing it so he could go home and stated he would not give any more. Addendum at 0630: Jeff White has slept 6.5 hrs. Since waking up at 0410, pt has been manic with pressured speech and restlessness.

## 2020-10-19 NOTE — BH NOTES
Psychiatric Progress Note    Patient: Liam Sky MRN: [id number]  SSN: [id number]    YOB: 1960  Age: 62 y.o. Sex: male      Admit Date: 10/14/2020    LOS: 5 days     Subjective:     10/18-Thuan Rashid  reports feeling fine and moods are good. He appears much more calm today and has gone to his room to sleep. Denies SI/HI/AH/VH. No aggression or violence. Pacing up and down hallways some this morning with talking and singing. Receiveid PRN medications last night. 10/19-Thuan Rashid reports feeling intoxicated and asked to be seen later. He had just been medicated for intrusiveness, hyperactivity and acing the unit agitated. Moods are elevated. Denies SI/HI/AH/VH. No aggression or violence. Interactive and semi-aware. Tolerating medications well. Eating and sleeping fairly.       Objective:     Vitals:    10/17/20 2113 10/18/20 0000 10/18/20 0811 10/19/20 0857   BP: 93/65 116/66 124/82 131/64   Pulse: (!) 57  93 (!) 103   Resp: 20 18 18   Temp:   97.9 °F (36.6 °C) 98.5 °F (36.9 °C)   SpO2:   100% 97%   Weight:       Height:            Mental Status Exam:   Sensorium  oriented to time, place and person   Relations cooperative   Eye Contact appropriate   Appearance:  age appropriate   Speech:  Loud, hyper-verbal, increased rate and rhythm   Thought Process: Flight of ideas , tangential   Thought Content free of delusions and free of hallucinations   Suicidal ideations none   Mood:  manic   Affect:  Mood congruent   Memory   adequate   Concentration:  adequate   Insight:  limited   Judgment:  limited       MEDICATIONS:  Current Facility-Administered Medications   Medication Dose Route Frequency    OXcarbazepine (TRILEPTAL) tablet 300 mg  300 mg Oral BID    OLANZapine (ZyPREXA) tablet 5 mg  5 mg Oral BID    Or    haloperidol lactate (HALDOL) injection 5 mg +++COURT ORDERED MEDICATION FOR REFUSAL OF OLANZAPINE+++  5 mg IntraMUSCular BID    ibuprofen (MOTRIN) tablet 600 mg  600 mg Oral Q6H PRN    OLANZapine (ZyPREXA) tablet 5 mg  5 mg Oral Q6H PRN    haloperidol lactate (HALDOL) injection 5 mg  5 mg IntraMUSCular Q6H PRN    benztropine (COGENTIN) tablet 1 mg  1 mg Oral BID PRN    diphenhydrAMINE (BENADRYL) injection 50 mg  50 mg IntraMUSCular BID PRN    hydrOXYzine HCL (ATARAX) tablet 50 mg  50 mg Oral TID PRN    LORazepam (ATIVAN) injection 1 mg  1 mg IntraMUSCular Q4H PRN    traZODone (DESYREL) tablet 50 mg  50 mg Oral QHS PRN    acetaminophen (TYLENOL) tablet 650 mg  650 mg Oral Q4H PRN    magnesium hydroxide (MILK OF MAGNESIA) 400 mg/5 mL oral suspension 30 mL  30 mL Oral DAILY PRN    nicotine (NICODERM CQ) 21 mg/24 hr patch 1 Patch  1 Patch TransDERmal DAILY      DISCUSSION:   the risks and benefits of the proposed medication  patient given opportunity to ask questions    Lab/Data Review: All lab results for the last 24 hours reviewed.      Recent Results (from the past 24 hour(s))   HEMOGLOBIN A1C WITH EAG    Collection Time: 10/19/20  5:18 AM   Result Value Ref Range    Hemoglobin A1c 5.6 4.0 - 5.6 %    Est. average glucose 114 mg/dL   LIPID PANEL    Collection Time: 10/19/20  5:18 AM   Result Value Ref Range    LIPID PROFILE          Cholesterol, total 195 <200 MG/DL    Triglyceride 345 (H) <150 MG/DL    HDL Cholesterol 41 MG/DL    LDL, calculated 85 0 - 100 MG/DL    VLDL, calculated 69 MG/DL    CHOL/HDL Ratio 4.8 0.0 - 5.0           Assessment:     Principal Problem:    Bipolar 1 disorder ([facility]) (10/14/2020)        Plan:     Continue current care  Increase Trileptal 300 mg PO BID  Collateral information  Disposition planning with social work    Signed By: Pankaj Ventura MD     October 19, 2020

## 2020-10-19 NOTE — INTERDISCIPLINARY ROUNDS
[facility] Interdisciplinary Rounds Patient Name: Miguel Baez  Age: 62 y.o. Room/Bed:  316/02 Primary Diagnosis: Bipolar 1 disorder ([facility] Admission Status: Involuntary Commitment Readmission within 30 days: no 
Power of  in place: unk Patient requires a blocked bed: no           
Reason for blocked bed: n/a Order for blocked bed obtained: no    
 
Sleep hours: 6.5 Morning Labs completed per orders:  Yes    
Participation in Care/Groups:  yes Medication Compliant?: Yes PRNS (last 24 hours): Antipsychotic (PO), Antianxiety and Pain Cogentin Restraints (last 24 hours):  no 
Substance Abuse:  yes 24 hour chart check complete:   
 
 
 
Patient goal(s) for today: continue taking meds as prescribe, engage in unit activities, participate in hygiene/ADLs, prepare for discharge, follow directions from staff, contact support team 
Treatment team focus/goals: continue adjusting meds as needed, discharge planning, update support system 
  
Progress note: Patient reportedly hyperverbal, delusional, intrusive, and involved in a verbal altercation with another patient over the weekend. Patient refusing vitals. Patient observed in bed with eyes clothes. He acknowledged treatment team and denied issues with medications. Medicated this morning due to behaviors consistent with those observed during weekend.  
  
LOS:  5                       Expected LOS: TBD 
  
Insurance info/prescription coverage:  [employer] Medicare (Part A&B to be confirmed by Pt Access) Date of last family contact:  PEPITO Leija (daughter, [phone]), Valente Chowdhury (aunt, [phone]) Family requesting physician contact today:  no 
Discharge plan:  Patient plans to return to his daughter's home Guns in the home:  no Outpatient provider(s):  TBD 
  
Participating treatment team members:  WILMER Fernandes, Dr. Lauren Solis MD

## 2020-10-19 NOTE — BH NOTES
Patient alert, verbal and orientedx4 upon morning assessment. Patient denies feelings of SI, HI, AVH, anxiety or depression. Patient reports feeling fine and in a good mood. Patient appears very manic, elevated and hyperactive. Patient has been reminded numerous times about his volume while speaking and asked to lower his voice. Patient is not easily redirectable. Concentration is poor, he is hyperverbal, very impulsive and unable to remain still. Patient has a racing thought process. Patient med and meal compliant. Q15 minute checks continued for safety. Patient' level of elevation continued to progress. Patient was cursing, pacing hallway and becoming increassing agitated. Patient having hallucinations of religiosity and grandeur. 1111-Patient administered PRN Ativan (1 mg), Benadryl (50 mg) and Haldol (5 mg) IM's due to behaviors described above.         Problem: Altered Thought Process (Adult/Pediatric)  Goal: *STG: Participates in treatment plan  Outcome: Progressing Towards Goal  Goal: *STG: Remains safe in hospital  Outcome: Progressing Towards Goal  Goal: *STG: Complies with medication therapy  Outcome: Progressing Towards Goal

## 2020-10-19 NOTE — GROUP NOTE
SATISH  GROUP DOCUMENTATION INDIVIDUAL Group Therapy Note Date: 10/19/2020 Group Start Time: 1100 Group End Time: 1200 Group Topic: Topic Group [address] 3 ACUTE BEHAV [facility], 300 [facility] GROUP DOCUMENTATION GROUP Group Therapy Note Attendees: 4 Attendance: Did not attend Patient's Goal: Interventions/techniques: 
 
Dena Mahan

## 2020-10-19 NOTE — BH NOTES
GROUP THERAPY PROGRESS NOTE    Nursing staff requested that pt refrain from participating in group due to escalated behavior. Patient did not participate in Coping Skills group.      WILMER Peng

## 2020-10-19 NOTE — PROGRESS NOTES
Patient alert, cooperative, compliant with vital signs and medication. Patient endorses anxiety, denies depression, denies SI, HI, AH, VH. Patient to dayroom for meals, to watch television and talk with peers. Patient loud and animated, periodically agitated, verbally aggressive primarily in beginning of day. Patient able to redirect self and request medication for anxiety when needed toward second half of day.

## 2020-10-19 NOTE — BH NOTES
GROUP THERAPY PROGRESS NOTE    Patient is participating in Discharge Group. Group time: 30 minutes    Personal goal for participation: Process feelings related to discharge and/or feelings/goals for today. Goal orientation: Personal    Group therapy participation: disruptive    Therapeutic interventions reviewed and discussed: Group discussion was focused on discharge plans and anxiety related to this. Group members discussed what they planned to do once discharge and discharge plans. Patients discussed their goals for today as well as the weekend and what they are working on with treatment team to get ready for discharge. Impression of participation: Pt was manic with euphoric mood, inapropriate affect, rapid/overproductive speech and racing thought process. Pt stood and marched hit feet the duration of the group. Pt was easily distracted and slow to follow redirection. Pt stated he did not want the \"spirits to leave him because he likes how they make him feel\". Pt also disclosed feeling exhausted by his racing thoughts and body movements. Pt was not able to identify a goal for today.

## 2020-10-19 NOTE — PROGRESS NOTES
Problem: Falls - Risk of  Goal: *Absence of Falls  Description: Document Radhajenniferviktoria Junior Fall Risk and appropriate interventions in the flowsheet.   Outcome: Progressing Towards Goal  Note: Fall Risk Interventions:            Medication Interventions: Teach patient to arise slowly         History of Falls Interventions: Room close to nurse's station         Problem: Altered Thought Process (Adult/Pediatric)  Goal: *STG: Remains safe in hospital  Outcome: Progressing Towards Goal  Goal: *LTG: Returns to baseline functioning  Outcome: Progressing Towards Goal

## 2020-10-19 NOTE — GROUP NOTE
SATISH  GROUP DOCUMENTATION INDIVIDUAL Group Therapy Note Date: 10/19/2020 Group Start Time: 0900 Group End Time: 1000 Group Topic: Topic Group [facility] 3 ACUTE BEHAV [facility] [place], [address] GROUP DOCUMENTATION GROUP Group Therapy Note Attendees: 4 Attendance: Did not attend Patient's Goal: Interventions/techniques Yefri Reagan

## 2020-10-19 NOTE — GROUP NOTE
SATISH  GROUP DOCUMENTATION INDIVIDUAL Group Therapy Note Date: 10/19/2020 Group Start Time: 1500 Group End Time: 4501 Group Topic: Recreational/Music Therapy [facility] [place] [address] ACUTE BEHAV [facility] [place], [address] GROUP DOCUMENTATION GROUP Group Therapy Note Attendees: 6 Attendance: Attended Patient's Goal:  To concentrate on selected task Interventions/techniques: Supported-crafts,games,music Follows Directions:  
 
Interactions: appropriate during session Mental Status: Calm Behavior/appearance: Cooperative and Needed prompting Goals Achieved: Able to engage in interactions and Able to listen to others Additional Notes:  Pleasant-arrived late-elected to watch TianaStealth10viktoria Poon [Follow-Up Visit] : a follow-up visit for Information could not be obtained

## 2020-10-20 PROCEDURE — 74011250637 HC RX REV CODE- 250/637: Performed by: NURSE PRACTITIONER

## 2020-10-20 PROCEDURE — 74011250637 HC RX REV CODE- 250/637: Performed by: PSYCHIATRY & NEUROLOGY

## 2020-10-20 PROCEDURE — 65220000003 HC RM SEMIPRIVATE PSYCH

## 2020-10-20 PROCEDURE — 74011250636 HC RX REV CODE- 250/636: Performed by: NURSE PRACTITIONER

## 2020-10-20 RX ADMIN — OLANZAPINE 5 MG: 5 TABLET, FILM COATED ORAL at 08:47

## 2020-10-20 RX ADMIN — ACETAMINOPHEN 650 MG: 325 TABLET, FILM COATED ORAL at 22:12

## 2020-10-20 RX ADMIN — HALOPERIDOL LACTATE 5 MG: 5 INJECTION, SOLUTION INTRAMUSCULAR at 22:27

## 2020-10-20 RX ADMIN — OXCARBAZEPINE 300 MG: 150 TABLET, FILM COATED ORAL at 17:32

## 2020-10-20 RX ADMIN — OLANZAPINE 5 MG: 5 TABLET, FILM COATED ORAL at 17:32

## 2020-10-20 RX ADMIN — HYDROXYZINE HYDROCHLORIDE 50 MG: 25 TABLET, FILM COATED ORAL at 18:27

## 2020-10-20 RX ADMIN — IBUPROFEN 600 MG: 600 TABLET, FILM COATED ORAL at 06:14

## 2020-10-20 RX ADMIN — OLANZAPINE 5 MG: 5 TABLET, FILM COATED ORAL at 06:55

## 2020-10-20 RX ADMIN — LORAZEPAM 1 MG: 2 INJECTION, SOLUTION INTRAMUSCULAR; INTRAVENOUS at 22:28

## 2020-10-20 RX ADMIN — ACETAMINOPHEN 650 MG: 325 TABLET, FILM COATED ORAL at 18:17

## 2020-10-20 RX ADMIN — DIPHENHYDRAMINE HYDROCHLORIDE 50 MG: 50 INJECTION, SOLUTION INTRAMUSCULAR; INTRAVENOUS at 22:27

## 2020-10-20 RX ADMIN — TRAZODONE HYDROCHLORIDE 50 MG: 50 TABLET ORAL at 21:26

## 2020-10-20 RX ADMIN — ACETAMINOPHEN 650 MG: 325 TABLET, FILM COATED ORAL at 06:55

## 2020-10-20 RX ADMIN — BENZTROPINE MESYLATE 1 MG: 1 TABLET ORAL at 06:29

## 2020-10-20 RX ADMIN — OXCARBAZEPINE 300 MG: 150 TABLET, FILM COATED ORAL at 08:47

## 2020-10-20 RX ADMIN — HYDROXYZINE HYDROCHLORIDE 50 MG: 25 TABLET, FILM COATED ORAL at 06:55

## 2020-10-20 RX ADMIN — OLANZAPINE 5 MG: 5 TABLET, FILM COATED ORAL at 21:26

## 2020-10-20 RX ADMIN — IBUPROFEN 600 MG: 600 TABLET, FILM COATED ORAL at 18:17

## 2020-10-20 NOTE — PROGRESS NOTES
Diet as tolerated. Pt may benefit from CC diet 2' elev TAG (345). Pt meal compliant. Hx notable for hypertriglyceridemia, non compliance.   Ht: 5'9\"  Wt: 150 lb  BMI: 22.15 kg/(m^2) c/w normal weight  Est energy needs: 1895 kcal, 70 g protein, 1 mL/kcal fluids  Pt will consume > 75% of meals at follow up 7-10 days  LOS

## 2020-10-20 NOTE — BH NOTES
GROUP THERAPY PROGRESS NOTE    Patient is participating in coping skills group. Group time: 45 minutes    Personal goal for participation: Learn coping skills to reduce stress and anxiety    Goal orientation: Personal    Group therapy participation: minimal    Therapeutic interventions reviewed and discussed: Group discussion on ways patients are coping with anxiety and stress and ways they relax. Discussion regarding alternative coping skills using the letters of the alphabet so that each patient would have a list of at least 26 different coping skills. Impression of participation:  Pt continued to walk in and out of the day room to participate in group. At the end of group pt stated \"Sorry I kept walking in and out of the room. It was a great group but I just always need to keep my feet moving. \" Pt identified \"being nice\", \"helping others\", and \"God\" as healthy coping mechanisms to combat negative behaviors, thoughts or emotions.     WILMER Jung

## 2020-10-20 NOTE — BH NOTES
Patient alert, verbal and oriented x4. Patient seen visible pacing in the hallways throughout the day. Patient attended groups and interacted well with staff and peers. Patient participated during treatment team. Patient med and meal complaint. Patient continues having delusions of religiosity and granduer. Patient is hyperverbal with rapid speech pattern and loose association. Patient requires constant redirection but is compliant. Patient is very impulsive and has poor concentration. Q15 minute checks continued for safety.       Problem: Altered Thought Process (Adult/Pediatric)  Goal: *STG: Participates in treatment plan  Outcome: Progressing Towards Goal  Goal: *STG: Remains safe in hospital  Outcome: Progressing Towards Goal  Goal: *STG: Complies with medication therapy  Outcome: Progressing Towards Goal

## 2020-10-20 NOTE — BH NOTES
GROUP THERAPY PROGRESS NOTE    Patient did not participate in Substance abuse/Coping Skill group.      WILMER Rae

## 2020-10-20 NOTE — GROUP NOTE
SATISH  GROUP DOCUMENTATION INDIVIDUAL Group Therapy Note Date: 10/20/2020 Group Start Time: 1100 Group End Time: 1200 Group Topic: Topic Group [facility] 3 ACUTE BEHAV [facility] [place], 300 [facility] GROUP DOCUMENTATION GROUP Group Therapy Note Attendees: 6 Attendance: Did not attend Patient's Goal: Interventions/techniques Jacque Renteria

## 2020-10-20 NOTE — BH NOTES
Psychiatric Progress Note    Patient: Enrike Paige MRN: [id number]  SSN: [id number]    YOB: 1960  Age: 62 y.o. Sex: male      Admit Date: 10/14/2020    LOS: 6 days     Subjective:     10/18-Thuan Rashid  reports feeling fine and moods are good. He appears much more calm today and has gone to his room to sleep. Denies SI/HI/AH/VH. No aggression or violence. Pacing up and down hallways some this morning with talking and singing. Receiveid PRN medications last night. 10/19-Thuan Rashid reports feeling intoxicated and asked to be seen later. He had just been medicated for intrusiveness, hyperactivity and acing the unit agitated. Moods are elevated. Denies SI/HI/AH/VH. No aggression or violence. Interactive and semi-aware. Tolerating medications well. Eating and sleeping fairly. 10/20 - Enrike Paige reports feeling well and moods are good. Less restless and states that his meds are better than the ones from [facility]. Denies SI/HI/AH/VH. No aggression or violence. Interactive and aware. Gives compliments to everyone without prompting. Tolerating medications well. Eating and sleeping fairly.       Objective:     Vitals:    10/18/20 0811 10/19/20 0857 10/20/20 0607 10/20/20 0734   BP: 124/82 131/64 120/82 110/66   Pulse: 93 (!) 103 93 77   Resp: 18 18 16 16   Temp: 97.9 °F (36.6 °C) 98.5 °F (36.9 °C) 97.6 °F (36.4 °C) 97.5 °F (36.4 °C)   SpO2: 100% 97% 99% 99%   Weight:       Height:            Mental Status Exam:   Sensorium  oriented to time, place and person   Relations cooperative   Eye Contact appropriate   Appearance:  age appropriate   Speech:  Loud, hyper-verbal, increased rate and rhythm   Thought Process: Flight of ideas , tangential   Thought Content free of delusions and free of hallucinations   Suicidal ideations none   Mood:  manic   Affect:  Mood congruent   Memory   adequate   Concentration:  adequate   Insight:  limited   Judgment:  limited MEDICATIONS:  Current Facility-Administered Medications   Medication Dose Route Frequency    OXcarbazepine (TRILEPTAL) tablet 300 mg  300 mg Oral BID    OLANZapine (ZyPREXA) tablet 5 mg  5 mg Oral BID    Or    haloperidol lactate (HALDOL) injection 5 mg +++COURT ORDERED MEDICATION FOR REFUSAL OF OLANZAPINE+++  5 mg IntraMUSCular BID    ibuprofen (MOTRIN) tablet 600 mg  600 mg Oral Q6H PRN    OLANZapine (ZyPREXA) tablet 5 mg  5 mg Oral Q6H PRN    haloperidol lactate (HALDOL) injection 5 mg  5 mg IntraMUSCular Q6H PRN    benztropine (COGENTIN) tablet 1 mg  1 mg Oral BID PRN    diphenhydrAMINE (BENADRYL) injection 50 mg  50 mg IntraMUSCular BID PRN    hydrOXYzine HCL (ATARAX) tablet 50 mg  50 mg Oral TID PRN    LORazepam (ATIVAN) injection 1 mg  1 mg IntraMUSCular Q4H PRN    traZODone (DESYREL) tablet 50 mg  50 mg Oral QHS PRN    acetaminophen (TYLENOL) tablet 650 mg  650 mg Oral Q4H PRN    magnesium hydroxide (MILK OF MAGNESIA) 400 mg/5 mL oral suspension 30 mL  30 mL Oral DAILY PRN    nicotine (NICODERM CQ) 21 mg/24 hr patch 1 Patch  1 Patch TransDERmal DAILY      DISCUSSION:   the risks and benefits of the proposed medication  patient given opportunity to ask questions    Lab/Data Review: All lab results for the last 24 hours reviewed. No results found for this or any previous visit (from the past 24 hour(s)).       Assessment:     Principal Problem:    Bipolar 1 disorder ([facility] [address] (10/14/2020)        Plan:     Continue current care  Continue Trileptal 300 mg PO BID  Collateral information  Disposition planning with social work    Signed By: Rona Daly MD     October 20, 2020

## 2020-10-20 NOTE — GROUP NOTE
SATISH  GROUP DOCUMENTATION INDIVIDUAL Group Therapy Note Date: 10/20/2020 Group Start Time: 0900 Group End Time: 1000 Group Topic: Topic Group [facility] 3 ACUTE BEHAV [facility] [place], [address] GROUP DOCUMENTATION GROUP Group Therapy Note Attendees: 4 Attendance: Did not attend Patient's Goal: Interventions/techniques: 
Bar Olea

## 2020-10-20 NOTE — PROGRESS NOTES
2000: Pt appear to be asleep. Breathing visible, no issues observed. Pt had an active day. 2130: Pt sleeping, breathing visible, no issues observed. 2300: Pt sleeping, breathing visible. 0100: Pt sleeping, breathing visible. No issues observed. 0248: Pt sleeping, breathing visible, no issues observed. 4189: Pt woke up and asked for medication for his feet. He is reporting a 7/10. Pt denied SI/HI/AH/VH. Pt denied anxiety and depression. Pt stated \"I was manic yesterday, I feel so much better today\"    2221: Pt states he would like Tylenol to help his foot pain. He stated \"It went down but I still feel achy\"   Pt also has increasing racing thoughts (self reported), hyper verbal and psychosis. Pt asked for PRN to assist with anxiety and psychosis. Pt accepted, no issues observed     0700: Gave off shift report to oncoming nurse        Problem: Falls - Risk of  Goal: *Absence of Falls  Description: Document Gorge Apo Fall Risk and appropriate interventions in the flowsheet.   Outcome: Progressing Towards Goal  Note: Fall Risk Interventions:            Medication Interventions: Teach patient to arise slowly         History of Falls Interventions: Room close to nurse's station         Problem: Altered Thought Process (Adult/Pediatric)  Goal: *STG: Participates in treatment plan  Outcome: Progressing Towards Goal  Goal: *STG: Remains safe in hospital  Outcome: Progressing Towards Goal     Problem: Patient Education: Go to Patient Education Activity  Goal: Patient/Family Education  Outcome: Progressing Towards Goal

## 2020-10-20 NOTE — INTERDISCIPLINARY ROUNDS
Behavioral Health Interdisciplinary Rounds Patient Name: Deneise Apgar  Age: 62 y.o. Room/Bed:  316/02 Primary [facility]: [facility] Admission Status: Involuntary Commitment Readmission within 30 days: no 
Power of  in place: no 
Patient requires a blocked bed: no           
Reason for blocked bed:  
Order for blocked bed obtained: no    
 
Sleep hours: 11 Morning Labs completed per orders:  no      
Participation in Care/Groups:  no 
Medication Compliant?: Yes PRNS (last 24 hours): Pain Restraints (last 24 hours):  no 
Substance Abuse:  no   
24 hour chart check complete: no  
 
 
Patient goal(s) for today: continue taking meds as prescribe, engage in unit activities, participate in hygiene/ADLs, prepare for discharge, follow directions from staff, contact support team 
Treatment team focus/goals: continue adjusting meds as needed, discharge planning, update support system 
  
Progress note:  patient presented as calm, oriented, and states that he is doing better with the medications. Indicated he is taking the ones prescribed but does prefer to avoid the IM medications, which he received yesterday \"because I was being loud\". Patient maintained appropriate eye contact, appropriate volume of speech. He is discharged-oriented. Willing to continue treatment in outpatient setting. 
  
LOS:   6                      Expected LOS: TBD 
  
Insurance info/prescription coverage:  VA Medicare (Part A&B to be confirmed by Pt Access) Date of last family Sacha Zamora (daughter, [phone]), Rafaela Brenner (aunt, [phone]) Family requesting physician contact today:  no 
Discharge plan:  Patient plans to return to his daughter's home Guns in the home:  no  Outpatient provider(s):  [facility] 
  
Participating treatment team members: WILMER Leone, Dr. Melissa Saleh MD

## 2020-10-21 PROCEDURE — 74011250637 HC RX REV CODE- 250/637: Performed by: NURSE PRACTITIONER

## 2020-10-21 PROCEDURE — 74011250637 HC RX REV CODE- 250/637: Performed by: PSYCHIATRY & NEUROLOGY

## 2020-10-21 PROCEDURE — 65220000003 HC RM SEMIPRIVATE PSYCH

## 2020-10-21 RX ADMIN — OXCARBAZEPINE 300 MG: 150 TABLET, FILM COATED ORAL at 17:18

## 2020-10-21 RX ADMIN — HYDROXYZINE HYDROCHLORIDE 50 MG: 25 TABLET, FILM COATED ORAL at 19:36

## 2020-10-21 RX ADMIN — IBUPROFEN 600 MG: 600 TABLET, FILM COATED ORAL at 17:18

## 2020-10-21 RX ADMIN — TRAZODONE HYDROCHLORIDE 50 MG: 50 TABLET ORAL at 20:06

## 2020-10-21 RX ADMIN — OLANZAPINE 5 MG: 5 TABLET, FILM COATED ORAL at 09:32

## 2020-10-21 RX ADMIN — OLANZAPINE 5 MG: 5 TABLET, FILM COATED ORAL at 17:18

## 2020-10-21 RX ADMIN — ACETAMINOPHEN 650 MG: 325 TABLET, FILM COATED ORAL at 17:18

## 2020-10-21 RX ADMIN — OXCARBAZEPINE 300 MG: 150 TABLET, FILM COATED ORAL at 09:32

## 2020-10-21 RX ADMIN — OLANZAPINE 5 MG: 5 TABLET, FILM COATED ORAL at 19:37

## 2020-10-21 NOTE — GROUP NOTE
SATISH  GROUP DOCUMENTATION INDIVIDUAL Group Therapy Note Date: 10/21/2020 Group Start Time: 1000 Group End Time: 1100 Group Topic: Topic Group [facility] [place] 3 ACUTE BEHAV [facility], 300 [facility] GROUP DOCUMENTATION GROUP Group Therapy Note Attendees: 4 Attendance: Did not attend Patient's Goal: Interventions/techniques Jatinder Curtis

## 2020-10-21 NOTE — BH NOTES
Psychiatric Progress Note    Patient: Denver Mage MRN: [id number]  SSN: [id number]    YOB: 1960  Age: 62 y.o. Sex: male      Admit Date: 10/14/2020    LOS: 7 days     Subjective:     10/18-Thuan Rashid  reports feeling fine and moods are good. He appears much more calm today and has gone to his room to sleep. Denies SI/HI/AH/VH. No aggression or violence. Pacing up and down hallways some this morning with talking and singing. Receiveid PRN medications last night. 10/19-Thuan Rashid reports feeling intoxicated and asked to be seen later. He had just been medicated for intrusiveness, hyperactivity and acing the unit agitated. Moods are elevated. Denies SI/HI/AH/VH. No aggression or violence. Interactive and semi-aware. Tolerating medications well. Eating and sleeping fairly. 10/20 - Denver Mage reports feeling well and moods are good. Less restless and states that his meds are better than the ones from [facility]. Denies SI/HI/AH/VH. No aggression or violence. Interactive and aware. Gives compliments to everyone without prompting. Tolerating medications well. Eating and sleeping fairly. 10/21 - Denver Mage reports feeling better each day, however. Nursing reports that he believes that he is lucifer and is waiting for his wings to return. He is also impulsive but pleasant. Denies SI/HI/AH/VH. No aggression or violence. Interactive and aware. Tolerating medications well. Eating and sleeping fairly.       Objective:     Vitals:    10/20/20 0607 10/20/20 0734 10/20/20 2001 10/21/20 0931   BP: 120/82 110/66 (!) 120/94 109/67   Pulse: 93 77 71 67   Resp: 16 16 16 16   Temp: 97.6 °F (36.4 °C) 97.5 °F (36.4 °C) 97.9 °F (36.6 °C) 98.1 °F (36.7 °C)   SpO2: 99% 99% 100% 98%   Weight:       Height:            Mental Status Exam:   Sensorium  oriented to time, place and person   Relations cooperative   Eye Contact appropriate   Appearance:  age appropriate   Speech:  Loud, hyper-verbal, increased rate and rhythm   Thought Process: Flight of ideas , tangential   Thought Content free of delusions and free of hallucinations   Suicidal ideations none   Mood:  manic   Affect:  Mood congruent   Memory   adequate   Concentration:  adequate   Insight:  limited   Judgment:  limited       MEDICATIONS:  Current Facility-Administered Medications   Medication Dose Route Frequency    OXcarbazepine (TRILEPTAL) tablet 300 mg  300 mg Oral BID    OLANZapine (ZyPREXA) tablet 5 mg  5 mg Oral BID    Or    haloperidol lactate (HALDOL) injection 5 mg +++COURT ORDERED MEDICATION FOR REFUSAL OF OLANZAPINE+++  5 mg IntraMUSCular BID    ibuprofen (MOTRIN) tablet 600 mg  600 mg Oral Q6H PRN    OLANZapine (ZyPREXA) tablet 5 mg  5 mg Oral Q6H PRN    haloperidol lactate (HALDOL) injection 5 mg  5 mg IntraMUSCular Q6H PRN    benztropine (COGENTIN) tablet 1 mg  1 mg Oral BID PRN    diphenhydrAMINE (BENADRYL) injection 50 mg  50 mg IntraMUSCular BID PRN    hydrOXYzine HCL (ATARAX) tablet 50 mg  50 mg Oral TID PRN    LORazepam (ATIVAN) injection 1 mg  1 mg IntraMUSCular Q4H PRN    traZODone (DESYREL) tablet 50 mg  50 mg Oral QHS PRN    acetaminophen (TYLENOL) tablet 650 mg  650 mg Oral Q4H PRN    magnesium hydroxide (MILK OF MAGNESIA) 400 mg/5 mL oral suspension 30 mL  30 mL Oral DAILY PRN    nicotine (NICODERM CQ) 21 mg/24 hr patch 1 Patch  1 Patch TransDERmal DAILY      DISCUSSION:   the risks and benefits of the proposed medication  patient given opportunity to ask questions    Lab/Data Review: All lab results for the last 24 hours reviewed. No results found for this or any previous visit (from the past 24 hour(s)).       Assessment:     Principal Problem:    Bipolar 1 disorder ([facility] [address] (10/14/2020)        Plan:     Continue current care  Continue Trileptal 300 mg PO BID  Collateral information  Disposition planning with social work    Signed By: Amee Saavedra MD October 21, 2020

## 2020-10-21 NOTE — BH NOTES
GROUP THERAPY PROGRESS NOTE    Patient did not participate in Discharge Group.      Sonali Dorsey, RN, PMHNP Student

## 2020-10-21 NOTE — PROGRESS NOTES
Patient alert, verbal, oriented x4. Patient Problem: Altered Thought Process (Adult/Pediatric) Goal: *STG: Participates in treatment plan Outcome: Progressing Towards Goal 
Goal: *STG: Complies with medication therapy Outcome: Progressing Towards Goal 
Goal: *STG: Attends activities and groups Outcome: Progressing Towards Goal

## 2020-10-21 NOTE — BH NOTES
Behavioral Health Treatment Team Note     Patient goal(s) for today: continue taking meds as prescribe, engage in unit activities, participate in hygiene/ADLs, prepare for discharge, follow directions from staff, contact support team  Treatment team focus/goals: continue adjusting meds as needed, discharge planning, update support system     Progress note:  patient observed sleeping when treatment team attempted to meet with him. He was unresponsive to efforts to wake him. Later observed walking in unit. Another patient spit ice that hit the back of his shirt. He addressed the issue appropriate with staff. Will continue to monitor for medication effectiveness.   SW spoke to patient's daughter, García Quinones, who reports she believes he will return to his aunt's home in [place].      VYK:   6                     [id number] [address] [place] [address]  info/prescription coverage:  [place] Medicare (Part A&B to be confirmed by Pt Access)  Date of last family Jossie Parks (daughter, [phone], contact 10/21/20), Saroj Richardson (aunt, [phone])  Family requesting physician contact today:  no  Discharge plan:  TBD  Guns in the home:  no   Outpatient provider(s):  TBD     Participating treatment team members: WILMER Kapoor, Dr. Reny Jensen MD

## 2020-10-21 NOTE — GROUP NOTE
SATISH  GROUP DOCUMENTATION INDIVIDUAL Group Therapy Note Date: 10/21/2020 Group Start Time: 1400 Group End Time: 1500 Group Topic: Recreational/Music Therapy [facility] [place] [address] ACUTE BEHAV [facility] [place], [address] GROUP DOCUMENTATION GROUP Group Therapy Note Attendees: 6 Attendance: Did not attend Patient's Goal: Interventions/techniques Caridad Fairchild

## 2020-10-22 PROCEDURE — 74011250637 HC RX REV CODE- 250/637: Performed by: PSYCHIATRY & NEUROLOGY

## 2020-10-22 PROCEDURE — 74011250637 HC RX REV CODE- 250/637: Performed by: NURSE PRACTITIONER

## 2020-10-22 PROCEDURE — 65220000003 HC RM SEMIPRIVATE PSYCH

## 2020-10-22 RX ADMIN — HYDROXYZINE HYDROCHLORIDE 50 MG: 25 TABLET, FILM COATED ORAL at 10:17

## 2020-10-22 RX ADMIN — HYDROXYZINE HYDROCHLORIDE 50 MG: 25 TABLET, FILM COATED ORAL at 20:07

## 2020-10-22 RX ADMIN — OXCARBAZEPINE 300 MG: 150 TABLET, FILM COATED ORAL at 17:15

## 2020-10-22 RX ADMIN — TRAZODONE HYDROCHLORIDE 50 MG: 50 TABLET ORAL at 21:29

## 2020-10-22 RX ADMIN — OLANZAPINE 5 MG: 5 TABLET, FILM COATED ORAL at 20:08

## 2020-10-22 RX ADMIN — ACETAMINOPHEN 650 MG: 325 TABLET, FILM COATED ORAL at 12:42

## 2020-10-22 RX ADMIN — IBUPROFEN 600 MG: 600 TABLET, FILM COATED ORAL at 20:07

## 2020-10-22 RX ADMIN — ACETAMINOPHEN 650 MG: 325 TABLET, FILM COATED ORAL at 21:29

## 2020-10-22 RX ADMIN — OLANZAPINE 5 MG: 5 TABLET, FILM COATED ORAL at 08:57

## 2020-10-22 RX ADMIN — OXCARBAZEPINE 300 MG: 150 TABLET, FILM COATED ORAL at 08:57

## 2020-10-22 RX ADMIN — OLANZAPINE 5 MG: 5 TABLET, FILM COATED ORAL at 17:16

## 2020-10-22 RX ADMIN — IBUPROFEN 600 MG: 600 TABLET, FILM COATED ORAL at 10:19

## 2020-10-22 NOTE — GROUP NOTE
SATISH  GROUP DOCUMENTATION INDIVIDUAL Group Therapy Note Date: 10/22/2020 Group Start Time: 1000 Group End Time: 1100 Group Topic: Topic Group [facility] 3 ACUTE BEHAV [facility] [place], [address] GROUP DOCUMENTATION GROUP Group Therapy Note Attendees: 2 Attendance: Attended Patient's Goal:  To participate in chair exercise routine Interventions/techniques: Supported-benefits of exrecise Follows Directions: Followed directions with prompts Interactions: Interacted appropriately Mental Status: Anxious Behavior/appearance: Cooperative and Needed prompting Goals Achieved: Able to engage in interactions and Able to listen to others Additional Notes:  Participated with prompting-fidgety-left and returned twice Cain Huerta

## 2020-10-22 NOTE — BH NOTES
GROUP THERAPY PROGRESS NOTE    Patient is participating in Process Group. Group time: 30 minutes    Personal goal for participation: To identify characteristics and things about ourselves that make us who we are. To become more self-aware. Goal orientation: personal    Group therapy participation: active/disruptive    Therapeutic interventions reviewed and discussed: Group discussion of characteristics of self-awareness. Patients were able to identify 14 characteristics about who they are as a person and discuss specific examples and why they are important to themselves. Discussed how identifying these characteristics can help with self-realization about whether or not we are being honest with ourselves, and if we are being truthful to those whom we surround ourselves with. Also discussed the importance of being heard. Impression of participation: Lazaro Childers was able to attend group this morning. He was an active participant in group, but at times became disruptive as he was tangential, excitable, and was standing up from his chair to talk, and continued to speak even though others were attempting to speak. He was easily redirectable when this occurred. Lazaro Childers was able to provide some examples of characteristics of who he is, to include being afraid of being alone. Support and feedback provided. Lazaro Childers was also noted to provide support and feedback to the other members of the group.

## 2020-10-22 NOTE — BH NOTES
Mr. Jeane Degroot is anxious, manic, restless, and hyperverbal. He constantly paces the hallway and speaks loudly--thus disturbing the milieu. He repeats the following Restoration delusional statements throughout the shift: \"Manny was the son of God, but they never wanted to read the Bernard Grandchild that's why they got 15 bibles in there and no Koran. Thomasena Dilling Thomasena Dilling \"Alicia was first and the Caucasians can't get over that because there is a righteousness inside of [place] that the Caucasians can't touch and the Caucasians have taken advantage of [place] since the beginning of time\". Patient was given Atarax 50 mg PO, but it was not effective. However, Mr. Jeane Degroot is redirectable and compliant with all medications and rules of the milieu. He attended group activity today and interacted appropriately with his peers. Mr. Jeane Degroot is not aggressive or violent, and denies any SI/HIAVH thus far.

## 2020-10-22 NOTE — BH NOTES
GROUP THERAPY PROGRESS NOTE    Patient did not participate in Process group.      Srinivas Hampton, RN, PMHNP Student

## 2020-10-22 NOTE — BH NOTES
GROUP THERAPY PROGRESS NOTE    Patient is participating in Coping Skills group. Group time: 45 minutes    Personal goal for participation: To discussion stressful situations and ways to cope. Goal orientation: Personal    Group therapy participation: active    Therapeutic interventions reviewed and discussed: Group discussion was focused on the handout Reducing Stress. Patients were able to talk about ways they observe stress in their bodies and in the ways they interact with others. This gave 11 ways that patient can help themselves to reduce or manage stress better. Group discussed these 11 statements and shared ways they are able to accomplish the suggestions. Impression of participation: Ana Eugene was able to attend coping skills group today. He was calm and cooperative. He sat with his back facing the group, but was able to actively participate and provided some discussion along with the rest of the group. Feedback and support provided. Ana Eugene did leave group early today.     Reji Forrest RN, PMHNP Student

## 2020-10-22 NOTE — PROGRESS NOTES
Problem: Falls - Risk of  Goal: *Absence of Falls  Description: Document Raffaele Betancourtmichelle Fall Risk and appropriate interventions in the flowsheet.   Outcome: Progressing Towards Goal  Note: Fall Risk Interventions:            Medication Interventions: Teach patient to arise slowly         History of Falls Interventions: Room close to nurse's station         Problem: Altered Thought Process (Adult/Pediatric)  Goal: *STG: Participates in treatment plan  Outcome: Progressing Towards Goal  Goal: *STG: Remains safe in hospital  Outcome: Progressing Towards Goal  Goal: *STG: Complies with medication therapy  Outcome: Progressing Towards Goal

## 2020-10-22 NOTE — INTERDISCIPLINARY ROUNDS
[facility] Interdisciplinary Rounds Patient Name: Trevor Zavaleta  Age: 62 y.o. Room/Bed:  316/02 Primary Diagnosis: Bipolar 1 disorder ([facility] [address] Admission Status: Involuntary Commitment Readmission within 30 days: no 
Power of  in place: unk Patient requires a blocked bed: no           
Reason for blocked bed: n/a Order for blocked bed obtained: no    
 
Sleep hours: 8 Morning Labs completed per orders:  na    
Participation in Care/Groups:  yes Medication Compliant?: Yes PRNS (last 24 hours): Antipsychotic (PO), Antipsychotic (IM), Antianxiety, Anticholinergic and Pain Restraints (last 24 hours):  No 
Substance Abuse:  yes 24 hour chart check complete:

## 2020-10-22 NOTE — PROGRESS NOTES
Problem: Falls - Risk of  Goal: *Absence of Falls  Description: Document Elbert Stone Fall Risk and appropriate interventions in the flowsheet.   Outcome: Progressing Towards Goal  Note: Fall Risk Interventions:            Medication Interventions: Teach patient to arise slowly         History of Falls Interventions: Room close to nurse's station         Problem: Patient Education: Go to Patient Education Activity  Goal: Patient/Family Education  Outcome: Progressing Towards Goal     Problem: Patient Education: Go to Patient Education Activity  Goal: Patient/Family Education  Outcome: Progressing Towards Goal     Problem: Altered Thought Process (Adult/Pediatric)  Goal: *STG: Participates in treatment plan  Outcome: Progressing Towards Goal  Goal: *STG: Remains safe in hospital  Outcome: Progressing Towards Goal  Goal: *STG: Seeks staff when feelings of anxiety and fear arise  Outcome: Progressing Towards Goal  Goal: *STG: Complies with medication therapy  Outcome: Progressing Towards Goal  Goal: *STG: Attends activities and groups  Outcome: Not Progressing Towards Goal  Goal: *STG: Decreased delusional thinking  Outcome: Not Progressing Towards Goal  Goal: *STG: Decreased hallucinations  Outcome: Progressing Towards Goal  Goal: *STG: Absence of lethality  Outcome: Progressing Towards Goal  Goal: *STG: Demonstrates ability to understand and use improved judgment in daily activities and relationships  Outcome: Not Progressing Towards Goal  Goal: *LTG: Returns to baseline functioning  Outcome: Not Progressing Towards Goal     Problem: Patient Education: Go to Patient Education Activity  Goal: Patient/Family Education  Outcome: Progressing Towards Goal     Problem: Discharge Planning  Goal: *Knowledge of medication management  Outcome: Not Progressing Towards Goal     Problem: Manic Behavior (Adult/Pediatric)  Goal: *STG: Participates in treatment plan  Outcome: Progressing Towards Goal  Goal: *STG: Exhibits improved nutritional intake  Outcome: Progressing Towards Goal  Goal: *STG: Demonstrates improvement in thought process and speech pattern  Outcome: Not Progressing Towards Goal  Goal: *STG: Maintains appropriate boundaries  Outcome: Progressing Towards Goal  Goal: *STG: Demonstrates improvement in sleep pattern  Outcome: Progressing Towards Goal  Goal: *STG: Seeks staff when feelings of anxiety and fear arise  Outcome: Progressing Towards Goal  Goal: *STG: Remains safe in hospital  Outcome: Progressing Towards Goal  Goal: *LTG: Verbalizes insights regarding recovery  Outcome: Not Progressing Towards Goal

## 2020-10-22 NOTE — BH NOTES
Psychiatric Progress Note    Patient: Rachael Nugent MRN: [id number]  SSN: [id number]    YOB: 1960  Age: 62 y.o. Sex: male      Admit Date: 10/14/2020    LOS: 8 days     Subjective:     10/18-Thuan Rashid  reports feeling fine and moods are good. He appears much more calm today and has gone to his room to sleep. Denies SI/HI/AH/VH. No aggression or violence. Pacing up and down hallways some this morning with talking and singing. Receiveid PRN medications last night. 10/19-Thuan Rashid reports feeling intoxicated and asked to be seen later. He had just been medicated for intrusiveness, hyperactivity and acing the unit agitated. Moods are elevated. Denies SI/HI/AH/VH. No aggression or violence. Interactive and semi-aware. Tolerating medications well. Eating and sleeping fairly. 10/20 - Rachael Nugent reports feeling well and moods are good. Less restless and states that his meds are better than the ones from [facility]. Denies SI/HI/AH/VH. No aggression or violence. Interactive and aware. Gives compliments to everyone without prompting. Tolerating medications well. Eating and sleeping fairly. 10/21 - Rachael Nugent reports feeling better each day, however. Nursing reports that he believes that he is lucifer and is waiting for his wings to return. He is also impulsive but pleasant. Denies SI/HI/AH/VH. No aggression or violence. Interactive and aware. Tolerating medications well. Eating and sleeping fairly. 10/22 - Rachael Nugent reports feeling less intense and moods are good. Slower pressured circumstantial speech. Denies SI/HI/AH/VH. No aggression or violence. Appropriately interactive and aware. Tolerating medications well. Eating and slept better last night.       Objective:     Vitals:    10/20/20 0734 10/20/20 2001 10/21/20 0931 10/22/20 0746   BP: 110/66 (!) 120/94 109/67 117/82   Pulse: 77 71 67 73   Resp: 16 16 16 20   Temp: 97.5 °F (36.4 °C) 97.9 °F (36.6 °C) 98.1 °F (36.7 °C) 98.5 °F (36.9 °C)   SpO2: 99% 100% 98% 98%   Weight:       Height:            Mental Status Exam:   Sensorium  oriented to time, place and person   Relations cooperative   Eye Contact appropriate   Appearance:  age appropriate   Speech:  Loud, hyper-verbal, increased rate and rhythm   Thought Process: Flight of ideas , tangential   Thought Content free of delusions and free of hallucinations   Suicidal ideations none   Mood:  manic   Affect:  Mood congruent   Memory   adequate   Concentration:  adequate   Insight:  limited   Judgment:  limited       MEDICATIONS:  Current Facility-Administered Medications   Medication Dose Route Frequency    OXcarbazepine (TRILEPTAL) tablet 300 mg  300 mg Oral BID    OLANZapine (ZyPREXA) tablet 5 mg  5 mg Oral BID    Or    haloperidol lactate (HALDOL) injection 5 mg +++COURT ORDERED MEDICATION FOR REFUSAL OF OLANZAPINE+++  5 mg IntraMUSCular BID    ibuprofen (MOTRIN) tablet 600 mg  600 mg Oral Q6H PRN    OLANZapine (ZyPREXA) tablet 5 mg  5 mg Oral Q6H PRN    haloperidol lactate (HALDOL) injection 5 mg  5 mg IntraMUSCular Q6H PRN    benztropine (COGENTIN) tablet 1 mg  1 mg Oral BID PRN    diphenhydrAMINE (BENADRYL) injection 50 mg  50 mg IntraMUSCular BID PRN    hydrOXYzine HCL (ATARAX) tablet 50 mg  50 mg Oral TID PRN    LORazepam (ATIVAN) injection 1 mg  1 mg IntraMUSCular Q4H PRN    traZODone (DESYREL) tablet 50 mg  50 mg Oral QHS PRN    acetaminophen (TYLENOL) tablet 650 mg  650 mg Oral Q4H PRN    magnesium hydroxide (MILK OF MAGNESIA) 400 mg/5 mL oral suspension 30 mL  30 mL Oral DAILY PRN    nicotine (NICODERM CQ) 21 mg/24 hr patch 1 Patch  1 Patch TransDERmal DAILY      DISCUSSION:   the risks and benefits of the proposed medication  patient given opportunity to ask questions    Lab/Data Review: All lab results for the last 24 hours reviewed.      No results found for this or any previous visit (from the past 24 hour(s)).       Assessment:     Principal Problem:    Bipolar 1 disorder ([facility] [address] (10/14/2020)        Plan:     Continue current care  Continue Trileptal 300 mg PO BID  Collateral information  Disposition planning with social work    Signed By: Brant Fothergill, MD     October 22, 2020

## 2020-10-22 NOTE — BH NOTES
Behavioral Health Treatment Team Note      Patient goal(s) for today: continue taking meds as prescribe, engage in unit activities, participate in hygiene/ADLs, prepare for discharge, follow directions from staff, contact support team  Treatment team focus/goals: continue adjusting meds as needed, discharge planning, update support system     Progress note:   Patient discharge oriented and is eager to leave. Patient maintains his medications are working and he likes the ones he's taking. He states he has spoken to his daughter (yesterday) and she is okay with him returning there for a few days before he goes back to his aunt's. He's tried to reach his aunt but can't get the call to go through. Patient adamant that he will continue with care once discharged.      [facility]:   0                     [address]  info/prescription coverage:  [place] Medicare (Part A&B to be confirmed by Pt Access)  Date of last family Laine Dukes (daughter, [phone], contact 10/21/20), Sheela Severs (aunt, [phone])  Family requesting physician contact today:  no  Discharge plan:  TBD  Guns in the home:  no   Outpatient provider(s):  TBD     Participating treatment team members: WILMER Bowie, Dr. Vanessa Cade MD*

## 2020-10-22 NOTE — GROUP NOTE
SATISH  GROUP DOCUMENTATION INDIVIDUAL Group Therapy Note Date: 10/22/2020 Group Start Time: 1400 Group End Time: 1500 Group Topic: Recreational/Music Therapy [facility] [place] [id number] ACUTE BEHAV [facility], [address] GROUP DOCUMENTATION GROUP Group Therapy Note Attendees: 3 Attendance: Attended Patient's Goal:  To concentrate on selected task Interventions/techniques: Supported-crafts,games,music Follows Directions: Followed directions Interactions: Interacted appropriately Mental Status: Calm Behavior/appearance: Cooperative and Needed prompting Goals Achieved: Able to engage in interactions and Able to listen to others Additional Notes:  Pleasant-engaged in conversation with peers and writer-arrived late Jatinder Curtis

## 2020-10-22 NOTE — INTERDISCIPLINARY ROUNDS
Assumed nursing care of Donna Chan after receiving the 1900 change of shift report. Donna Chan presented as manic and was hyperverbal with pressured speech. During the first of the shift he remained in the hallway talking to anyone, peers or staff. He is also a little intrusive and likes to care take and give advice. At one point pt stated to writer, \"I have information noone else knows about from God. \"  But he denies all issues including s/s of psychosis, SI,. HI, depression. He acknowledges some anxiety. He received Atarax 50mg po at 1936 for c/o anxiety, Zyprexa 5mg po for agitation at 1937 and Trazodone 5mg po at 2006 for sleep. Pt often asks, \"What else can I take? \"   Pt did go to bed after stating he was tired and was asleep around 2100. Will be monitoring closely for mood chgs, behavior and for safety. Addendum at 0615: Donna Chan has had around 8 hrs sleep and has been resting quietly since going to bed. He is resting quietly in bed at this time.

## 2020-10-23 VITALS
TEMPERATURE: 97.5 F | HEIGHT: 69 IN | BODY MASS INDEX: 22.22 KG/M2 | SYSTOLIC BLOOD PRESSURE: 121 MMHG | DIASTOLIC BLOOD PRESSURE: 78 MMHG | OXYGEN SATURATION: 99 % | RESPIRATION RATE: 18 BRPM | WEIGHT: 150 LBS | HEART RATE: 74 BPM

## 2020-10-23 PROCEDURE — 74011250637 HC RX REV CODE- 250/637: Performed by: NURSE PRACTITIONER

## 2020-10-23 PROCEDURE — 74011250637 HC RX REV CODE- 250/637: Performed by: PSYCHIATRY & NEUROLOGY

## 2020-10-23 RX ORDER — OLANZAPINE 5 MG/1
5 TABLET ORAL 2 TIMES DAILY
Qty: 60 TAB | Refills: 0 | Status: SHIPPED | OUTPATIENT
Start: 2020-10-23

## 2020-10-23 RX ORDER — OXCARBAZEPINE 300 MG/1
300 TABLET, FILM COATED ORAL 2 TIMES DAILY
Qty: 60 TAB | Refills: 0 | Status: SHIPPED | OUTPATIENT
Start: 2020-10-23

## 2020-10-23 RX ORDER — HYDROXYZINE 50 MG/1
50 TABLET, FILM COATED ORAL
Qty: 30 TAB | Refills: 0 | Status: SHIPPED | OUTPATIENT
Start: 2020-10-23 | End: 2020-11-02

## 2020-10-23 RX ORDER — TRAZODONE HYDROCHLORIDE 50 MG/1
50 TABLET ORAL
Qty: 30 TAB | Refills: 0 | Status: SHIPPED | OUTPATIENT
Start: 2020-10-23

## 2020-10-23 RX ORDER — BENZTROPINE MESYLATE 1 MG/1
1 TABLET ORAL
Qty: 60 TAB | Refills: 0 | Status: SHIPPED | OUTPATIENT
Start: 2020-10-23

## 2020-10-23 RX ADMIN — OLANZAPINE 5 MG: 5 TABLET, FILM COATED ORAL at 03:00

## 2020-10-23 RX ADMIN — OLANZAPINE 5 MG: 5 TABLET, FILM COATED ORAL at 14:43

## 2020-10-23 RX ADMIN — HYDROXYZINE HYDROCHLORIDE 50 MG: 25 TABLET, FILM COATED ORAL at 14:43

## 2020-10-23 RX ADMIN — ACETAMINOPHEN 650 MG: 325 TABLET, FILM COATED ORAL at 14:43

## 2020-10-23 RX ADMIN — OXCARBAZEPINE 300 MG: 150 TABLET, FILM COATED ORAL at 09:48

## 2020-10-23 RX ADMIN — IBUPROFEN 600 MG: 600 TABLET, FILM COATED ORAL at 02:38

## 2020-10-23 RX ADMIN — OLANZAPINE 5 MG: 5 TABLET, FILM COATED ORAL at 09:48

## 2020-10-23 RX ADMIN — ACETAMINOPHEN 650 MG: 325 TABLET, FILM COATED ORAL at 12:22

## 2020-10-23 RX ADMIN — IBUPROFEN 600 MG: 600 TABLET, FILM COATED ORAL at 10:32

## 2020-10-23 RX ADMIN — HYDROXYZINE HYDROCHLORIDE 50 MG: 25 TABLET, FILM COATED ORAL at 03:00

## 2020-10-23 NOTE — SUICIDE SAFETY PLAN
SAFETY PLAN    A suicide Safety Plan is a document that supports someone when they are having thoughts of suicide. Warning Signs that indicate a suicidal crisis may be developing: What (situations, thoughts, feelings, body sensations, behaviors, etc.) do you experience that lets you know you are beginning to think about suicide? 1. Patient unable to identify anything  2.   3. Internal Coping Strategies:  What things can I do (relaxation techniques, hobbies, physical activities, etc.) to take my mind off my problems without contacting another person? 1. Meditate on nature  2.   3.     People and social settings that provide distraction: Who can I call or where can I go to distract me? 1. Name: Aunt  Phone:   2. Name: cousin  Phone:    3. Place:             4. Place:     People whom I can ask for help: Who can I call when I need help - for example, friends, family, clergy, someone else? 1. Name: aunt                Phone:   2. Name: Carrington Cargo  Phone:   3. Name: daughter, Ariel Tolentino  Phone:     Professionals or [address] [place] [address] I can contact during a crisis: Who can I call for help - for example, my doctor, my psychiatrist, my psychologist, a mental health provider, a suicide hotline? 1. Clinician Name: Jann Hong   Phone: [phone]      Clinician Pager or Emergency Contact #:     2. Clinician Name:    Phone:      Clinician Pager or Emergency Contact #:     3. Suicide Prevention Lifeline: 8-376-218-TALK (5194)    4. [address] [place] [address] Emergency Services -  for example, [facility] suicide hotline, [facility] Hotline:       Emergency Services Address: [address] [facility], [address] [facility]         Emergency Services Phone: [phone]    Making the environment safe: How can I make my environment (house/apartment/living space) safer? For example, can I remove guns, medications, and other items? 1. No weapons or guns  2.  No safety concerns

## 2020-10-23 NOTE — GROUP NOTE
SATISH  GROUP DOCUMENTATION INDIVIDUAL Group Therapy Note Date: 10/23/2020 Group Start Time: 0900 Group End Time: 1000 Group Topic: Topic Group [facility] [address] ACUTE BEH[facility] [place], [address] GROUP DOCUMENTATION GROUP Group Therapy Note Attendees: 4 Attendance: Attended Patient's Goal:  To identify natural highs Interventions/techniques: Supported-worksheet Follows Directions:  
 
Interactions: Interacted appropriately Mental Status: calm Behavior/appearance: Cooperative and Needed prompting Goals Achieved: Able to engage in interactions and Able to listen to others Additional Notes:  Pleasant-elected to listen Stefan Land

## 2020-10-23 NOTE — BH NOTES
Behavioral Health Transition Record to Provider    Patient Name: Sridevi Brock  YOB: 1960  Medical Record Number: [id number]  Date of Admission: 10/14/2020  Date of Discharge: 10/23/20    Attending Provider: Marcelino Tilley MD  Discharging Provider: Marcelino Tilley MD  To contact this individual call [phone] and ask the  to page. If unavailable, ask to be transferred to [address] [place] [address] Provider on call. Stevan Melissa Provider will be available on call 24/7 and during holidays. Primary Care Provider: None    Allergies   Allergen Reactions    Bee Sting [Sting, Bee] Swelling       Reason for Admission: Patient reports he was walking across the street from [place] when he slipped on some oil and fell. He realized he dropped his lighter and turned back into the road to get it. He reports people called and said he was praying, not playing, in the road and they picked him up. Prescreen indicates that patient was in [facility] two weeks ago, stopped taking his medicine due to feeling jittery. He was found laying in the roadway. Stated that he slipped on oil. Was observed talking to himself. Daughter reports he's always out on streets and has stood in traffic before. Aunt says he wanders the streets all the time.      Ad[facility] Diagnosis: [facility] [F31.9]    * No surgery found *    Results for orders placed or performed during the hospital encounter of 10/14/20   SARS-COV-2, PCR    Specimen: Nasopharyngeal   Result Value Ref Range    Specimen source Nasopharyngeal      SARS-CoV-2 Not detected NOTD     CBC WITH AUTOMATED DIFF   Result Value Ref Range    WBC 7.4 4.1 - 11.1 K/uL    RBC 4.00 (L) 4.10 - 5.70 M/uL    HGB 11.5 (L) 12.1 - 17.0 g/dL    HCT 35.7 (L) 36.6 - 50.3 %    MCV 89.3 80.0 - 99.0 FL    MCH 28.8 26.0 - 34.0 PG    MCHC 32.2 30.0 - 36.5 g/dL    RDW 14.2 11.5 - 14.5 %    PLATELET 037 845 - 591 K/uL    MPV 9.0 8.9 - 12.9 FL    NRBC 0.0 0 PER 100 WBC    ABSOLUTE NRBC 0.00 0.00 - 0.01 K/uL    NEUTROPHILS 68 32 - 75 %    LYMPHOCYTES 20 12 - 49 %    MONOCYTES 9 5 - 13 %    EOSINOPHILS 2 0 - 7 %    BASOPHILS 1 0 - 1 %    IMMATURE GRANULOCYTES 0 0.0 - 0.5 %    ABS. NEUTROPHILS 5.1 1.8 - 8.0 K/UL    ABS. LYMPHOCYTES 1.5 0.8 - 3.5 K/UL    ABS. MONOCYTES 0.7 0.0 - 1.0 K/UL    ABS. EOSINOPHILS 0.2 0.0 - 0.4 K/UL    ABS. BASOPHILS 0.1 0.0 - 0.1 K/UL    ABS. IMM. GRANS. 0.0 0.00 - 0.04 K/UL    DF AUTOMATED     METABOLIC PANEL, COMPREHENSIVE   Result Value Ref Range    Sodium 137 136 - 145 mmol/L    Potassium 4.8 3.5 - 5.1 mmol/L    Chloride 104 97 - 108 mmol/L    CO2 29 21 - 32 mmol/L    Anion gap 4 (L) 5 - 15 mmol/L    Glucose 103 (H) 65 - 100 mg/dL    BUN 12 6 - 20 MG/DL    Creatinine 0.88 0.70 - 1.30 MG/DL    BUN/Creatinine ratio 14 12 - 20      GFR est AA >60 >60 ml/min/1.73m2    GFR est non-AA >60 >60 ml/min/1.73m2    Calcium 9.3 8.5 - 10.1 MG/DL    Bilirubin, total 0.3 0.2 - 1.0 MG/DL    ALT (SGPT) 37 12 - 78 U/L    AST (SGOT) 42 (H) 15 - 37 U/L    Alk.  phosphatase 79 45 - 117 U/L    Protein, total 6.8 6.4 - 8.2 g/dL    Albumin 3.4 (L) 3.5 - 5.0 g/dL    Globulin 3.4 2.0 - 4.0 g/dL    A-G Ratio 1.0 (L) 1.1 - 2.2     ETHYL ALCOHOL   Result Value Ref Range    ALCOHOL(ETHYL),SERUM <10 <10 MG/DL   URINALYSIS W/ REFLEX CULTURE    Specimen: Urine   Result Value Ref Range    Color YELLOW/STRAW      Appearance CLEAR CLEAR      Specific gravity 1.010 1.003 - 1.030      pH (UA) 7.0 5.0 - 8.0      Protein Negative NEG mg/dL    Glucose Negative NEG mg/dL    Ketone Negative NEG mg/dL    Bilirubin Negative NEG      Blood Negative NEG      Urobilinogen 0.2 0.2 - 1.0 EU/dL    Nitrites Negative NEG      Leukocyte Esterase Negative NEG      WBC 0-4 0 - 4 /hpf    RBC 0-5 0 - 5 /hpf    Epithelial cells FEW FEW /lpf    Bacteria Negative NEG /hpf    UA:UC IF INDICATED CULTURE NOT INDICATED BY UA RESULT CNI     DRUG SCREEN, URINE   Result Value Ref Range    AMPHETAMINES Negative NEG BARBITURATES Negative NEG      BENZODIAZEPINES Negative NEG      COCAINE Negative NEG      METHADONE Negative NEG      OPIATES Negative NEG      PCP(PHENCYCLIDINE) Negative NEG      THC (TH-CANNABINOL) Positive (A) NEG      Drug screen comment (NOTE)    SARS-COV-2   Result Value Ref Range    Specimen source Nasopharyngeal      Specimen source Nasopharyngeal      COVID-19 rapid test Not detected NOTD      Specimen type NP Swab      Health status Symptomatic Testing     HEMOGLOBIN A1C WITH EAG   Result Value Ref Range    Hemoglobin A1c 5.6 4.0 - 5.6 %    Est. average glucose 114 mg/dL   LIPID PANEL   Result Value Ref Range    LIPID PROFILE          Cholesterol, total 195 <200 MG/DL    Triglyceride 345 (H) <150 MG/DL    HDL Cholesterol 41 MG/DL    LDL, calculated 85 0 - 100 MG/DL    VLDL, calculated 69 MG/DL    CHOL/HDL Ratio 4.8 0.0 - 5.0         Immunizations administered during this encounter: There is no immunization history for the selected administration types on file for this patient. Screening for Metabolic Disorders for Patients on Antipsychotic Medications  (Data obtained from the EMR)    Estimated Body Mass Index  Estimated body mass index is 22.15 kg/m² as calculated from the following:    Height as of this encounter: 5' 9\" (1.753 m). Weight as of this encounter: 68 kg (150 lb).      Vital Signs/Blood Pressure  Visit Vitals  /78   Pulse 74   Temp 97.5 °F (36.4 °C)   Resp 18   Ht 5' 9\" (1.753 m)   Wt 68 kg (150 lb)   SpO2 99%   BMI 22.15 kg/m²       Blood Glucose/Hemoglobin A1c  Lab Results   Component Value Date/Time    Glucose 103 (H) 10/14/2020 11:32 AM       Lab Results   Component Value Date/Time    Hemoglobin A1c 5.6 10/19/2020 05:18 AM        Lipid Panel  Lab Results   Component Value Date/Time    Cholesterol, total 195 10/19/2020 05:18 AM    HDL Cholesterol 41 10/19/2020 05:18 AM    LDL,Direct 119 (H) 02/03/2014 06:04 AM    LDL, calculated 85 10/19/2020 05:18 AM    Triglyceride 345 (H) 10/19/2020 05:18 AM    CHOL/HDL Ratio 4.8 10/19/2020 05:18 AM        Discharge Diagnosis: please refer to physician's discharge summary    Discharge Plan: The patient Willard Bruner exhibits the ability to control behavior in a less restrictive environment. Patient's level of functioning is improving. No assaultive/destructive behavior has been observed for the past 24 hours. No suicidal/homicidal threat or behavior has been observed for the past 24 hours. There is no evidence of serious medication side effects. Patient has not been in physical or protective restraints for at least the past 24 hours. If weapons involved, how are they secured? No weapons concerns    Is patient aware of and in agreement with discharge plan? yes    Arrangements for medication:  Prescriptions given to patient, given a weeks supply or 30 day supply. Copy of discharge instructions to provider?:  yes    Arrangements for transportation home:  Cousin to transport him home    Keep all follow up appointments as scheduled, continue to take prescribed medications per physician instructions. Mental health crisis number:  [id number] or your local mental health crisis line number at  [phone]      Discharge Medication List and Instructions:   Current Discharge Medication List      START taking these medications    Details   OLANZapine (ZyPREXA) 5 mg tablet Take 1 Tab by mouth two (2) times a day. Indications: hugo associated with bipolar disorder  Qty: 60 Tab, Refills: 0      OXcarbazepine (TRILEPTAL) 300 mg tablet Take 1 Tab by mouth two (2) times a day. Indications: bipolar disorder  Qty: 60 Tab, Refills: 0      traZODone (DESYREL) 50 mg tablet Take 1 Tab by mouth nightly as needed for Sleep (For insomnia). Indications: insomnia associated with depression  Qty: 30 Tab, Refills: 0      hydrOXYzine HCL (ATARAX) 50 mg tablet Take 1 Tab by mouth three (3) times daily as needed for Anxiety for up to 10 days.  Indications: anxious  Qty: 30 Tab, Refills: 0         CONTINUE these medications which have CHANGED    Details   benztropine (COGENTIN) 1 mg tablet Take 1 Tab by mouth two (2) times daily as needed (For mild-moderate extrapyramidal symptoms). Indications: extrapyramidal symptoms as a result of taking the medication  Qty: 60 Tab, Refills: 0         STOP taking these medications       haloperidoL (HALDOL) 10 mg tablet Comments:   Reason for Stopping:         divalproex ER (Depakote ER) 250 mg ER tablet Comments:   Reason for Stopping:               Unresulted Labs (24h ago, onward)    None        To obtain results of studies pending at discharge, please contact N/A    Follow-up Information     Follow up With Specialties Details Why Contact Info    [facility]  Call in 1 week  Estefany Mendoza will call you at [phone] on Monday or Tuesday next week [address] [place] [address]  [phone] (main line)  [phone]    None    None (395) Patient stated that they have no PCP            Advanced Directive:   Does the patient have an appointed surrogate decision maker? No  Does the patient have a Medical Advance Directive? No  Does the patient have a Psychiatric Advance Directive? No  If the patient does not have a surrogate or Medical Advance Directive AND Psychiatric Advance Directive, the patient was offered information on these advance directives Patient declined to complete    Patient Instructions: Please continue all medications until otherwise directed by physician. Tobacco Cessation Discharge Plan:   Is the patient a smoker and needs referral for smoking cessation? Yes  Patient referred to the following for smoking cessation with an appointment? Yes     Patient was offered medication to assist with smoking cessation at discharge? No  Was education for smoking cessation added to the discharge instructions?  Yes    Alcohol/Substance Abuse Discharge Plan:   Does the patient have a history of substance/alcohol abuse and requires a referral for treatment? Yes  Patient referred to the following for substance/alcohol abuse treatment with an appointment? Yes  Patient was offered medication to assist with alcohol cessation at discharge? Not applicable  Was education for substance/alcohol abuse added to discharge instructions? Yes    Patient discharged to Home; discussed with patient/caregiver and provided to the patient/caregiver either in hard copy or electronically.

## 2020-10-23 NOTE — BH NOTES
GROUP THERAPY PROGRESS NOTE    Patient did not participate in Discharge Group.      Haven Saini, RN, PMHNP Student

## 2020-10-23 NOTE — BH NOTES
Assumed nursing care of Zenobia Silvestre after receiving the 1900 change of shift report. Zenobia Silvestre presented with manic behavior during the shift. He has been restless, irritable or happy, intrusive with staff and peers, caretakes and gives advice, hyperverbal with manic speech. He does accept redirection well. Pt received Trazodone 50mg po at 2129 for c/o insomnia, Atarax 50mg po at 2007 and 0300 for c/o anxiety, Zyprexa 5mg po at 2008 and 0300 for c/o agitation, Motrin 600mg po at 2007 and 0238 for c/o pain, Tylenol 650mg po at 2129 for c/o pain to botton of feet. There are several darker areas between several toes where pt reported pain and stated these areas were prior blisters. Monitoring closely for mood chgs, behavior and for safety. Addendum at 0631:  Pt has slept for 5.5 hrs. He has been up several times pacing and being hyperverbal, resting quietly in bed at this time.

## 2020-10-23 NOTE — DISCHARGE SUMMARY
PSYCHIATRIC DISCHARGE SUMMARY         IDENTIFICATION:    Patient Name  Amber Armendariz   Date of Birth 1960   [facility] [id number]   Medical Record Number  [id number]      Age  62 y.o. 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  ICD-9-CM: 301.0     Overview Signed 10/4/2013 10:10 AM by Daiana Meier MD     Rule out             Bipolar I disorder, most recent episode (or current) manic, severe, specified as with psychotic behavior ICD-10-CM: F31.2  ICD-9-CM: 296.44               Active Hospital Problems    *Bipolar 1 disorder ([facility]        DISCHARGE DIAGNOSIS:   Axis I:  SEE ABOVE  Axis II: SEE ABOVE  Axis III: SEE ABOVE  Axis IV:  lack of structure  Axis V:  <50 on admission, 55+ on discharge     CC & HISTORY OF PRESENT ILLNESS:  67-year-old  male with a history of bipolar disorder, brought into the emergency room by [facility] for clearance. He describes the same story to this interviewer that he has described to the emergency room that he was crossing the street, slipped on some oil and fell. When he got back up and had finished crossing the street, he looked back and felt that he had dropped his lighter, went back into the street to get it. Because someone saw him crossing the street twice, they called the police and told them that he was walking back and forth in traffic, and they brought him to get help. He denies suicidal or homicidal ideations. No auditory or visual hallucinations.   He just got out of [facility] where he was put on Depakote and a number of other medications, he says made him felt too jittery and so, he has been off them for 2 weeks now. He says they are still in the system and make him feel funny. He does better without them. He does not want a shot or injection either. He says he is doing fine and just needs to walk; paced around the unit, it helps him feel better. Denies suicidal or homicidal ideations. No auditory or visual hallucinations. He is here for management of his condition.      SOCIAL HISTORY:    Social History     Socioeconomic History    Marital status:      Spouse name: Not on file    Number of children: Not on file    Years of education: Not on file    Highest education level: Not on file   Occupational History    Not on file   Social Needs    Financial resource strain: Not on file    Food insecurity     Worry: Not on file     Inability: Not on file    Transportation needs     Medical: Not on file     Non-medical: Not on file   Tobacco Use    Smoking status: Former Smoker     Packs/day: 1.00    Smokeless tobacco: Never Used   Substance and Sexual Activity    Alcohol use: No    Drug use: Not Currently     Types: Marijuana, Cocaine, Prescription    Sexual activity: Not Currently   Lifestyle    Physical activity     Days per week: Not on file     Minutes per session: Not on file    Stress: Not on file   Relationships    Social connections     Talks on phone: Not on file     Gets together: Not on file     Attends [employer]ism service: Not on file     Active member of club or organization: Not on file     Attends meetings of clubs or organizations: Not on file     Relationship status: Not on file    Intimate partner violence     Fear of current or ex partner: Not on file     Emotionally abused: Not on file     Physically abused: Not on file     Forced sexual activity: Not on file   Other Topics Concern    Not on file Social History Narrative    ** Merged History Encounter **           FAMILY HISTORY:   History reviewed. No pertinent family history. HOSPITALIZATION COURSE:    Jazz Santos was admitted to the inpatient psychiatric unit [facility] for acute psychiatric stabilization in regards to symptomatology as described in the HPI above. The differential diagnosis at time of admission included: schizophrenia vs substance induced psychotic disorder schizoaffective vs bipolar vs.  adjustment disorder. While on the unit Jazz Santos was involved in individual, group, occupational and milieu therapy. Psychiatric medications were adjusted during this hospitalization. Jazz Santos demonstrated a progressive improvement in overall condition. Much of patient's initial presentation appeared to be related to situational stressors, effects of medication non-compliance and psychological factors. Please see individual progress notes for more specific details regarding patient's hospitalization course. Jazz Santos reports feeling well and more in control of himself. Moods are good. Thoughts are clear and coherent. Denies SI/HI/AH/VH. No aggression or violence. Appropriately interactive and aware. Tolerating medications well. Eating and sleeping fairly. Patient with request for discharge today. There are no grounds to seek a TDO. At time of discharge, Jazz Santos is without significant problems of depression, psychosis, or emilia. Patient free of suicidal and homicidal ideations (appears to be at very low risk of suicide or homicide) and reports many positive predictive factors in terms of not attempting suicide or homicide. Overall presentation at time of discharge is most consistent with the diagnosis of Bipolar Emilia. Patient has maximized benefit to be derived from acute inpatient psychiatric treatment.   All members of the treatment team concur with each other in regards to plans for discharge today. Patient and family are aware and in agreement with discharge and discharge plan.          LABS AND IMAGAING:    Labs Reviewed   CBC WITH AUTOMATED DIFF - Abnormal; Notable for the following components:       Result Value    RBC 4.00 (*)     HGB 11.5 (*)     HCT 35.7 (*)     All other components within normal limits   METABOLIC PANEL, COMPREHENSIVE - Abnormal; Notable for the following components:    Anion gap 4 (*)     Glucose 103 (*)     AST (SGOT) 42 (*)     Albumin 3.4 (*)     A-G Ratio 1.0 (*)     All other components within normal limits   DRUG SCREEN, URINE - Abnormal; Notable for the following components:    THC (TH-CANNABINOL) Positive (*)     All other components within normal limits   LIPID PANEL - Abnormal; Notable for the following components:    Triglyceride 345 (*)     All other components within normal limits   SARS-COV-2, PCR   ETHYL ALCOHOL   URINALYSIS W/ REFLEX CULTURE   SARS-COV-2   HEMOGLOBIN A1C WITH EAG     Lab Results   Component Value Date/Time    Valproic acid 70 02/03/2014 06:04 AM     Admission on 10/14/2020   Component Date Value Ref Range Status    WBC 10/14/2020 7.4  4.1 - 11.1 K/uL Final    RBC 10/14/2020 4.00* 4.10 - 5.70 M/uL Final    HGB 10/14/2020 11.5* 12.1 - 17.0 g/dL Final    HCT 10/14/2020 35.7* 36.6 - 50.3 % Final    MCV 10/14/2020 89.3  80.0 - 99.0 FL Final    MCH 10/14/2020 28.8  26.0 - 34.0 PG Final    MCHC 10/14/2020 32.2  30.0 - 36.5 g/dL Final    RDW 10/14/2020 14.2  11.5 - 14.5 % Final    PLATELET 14/75/2375 506  150 - 400 K/uL Final    MPV 10/14/2020 9.0  8.9 - 12.9 FL Final    NRBC 10/14/2020 0.0  0  WBC Final    ABSOLUTE NRBC 10/14/2020 0.00  0.00 - 0.01 K/uL Final    NEUTROPHILS 10/14/2020 68  32 - 75 % Final    LYMPHOCYTES 10/14/2020 20  12 - 49 % Final    MONOCYTES 10/14/2020 9  5 - 13 % Final    EOSINOPHILS 10/14/2020 2  0 - 7 % Final    BASOPHILS 10/14/2020 1  0 - 1 % Final    IMMATURE GRANULOCYTES 10/14/2020 0  0.0 - 0.5 % Final    ABS. NEUTROPHILS 10/14/2020 5.1  1.8 - 8.0 K/UL Final    ABS. LYMPHOCYTES 10/14/2020 1.5  0.8 - 3.5 K/UL Final    ABS. MONOCYTES 10/14/2020 0.7  0.0 - 1.0 K/UL Final    ABS. EOSINOPHILS 10/14/2020 0.2  0.0 - 0.4 K/UL Final    ABS. BASOPHILS 10/14/2020 0.1  0.0 - 0.1 K/UL Final    ABS. IMM. GRANS. 10/14/2020 0.0  0.00 - 0.04 K/UL Final    DF 10/14/2020 AUTOMATED    Final    Sodium 10/14/2020 137  136 - 145 mmol/L Final    Potassium 10/14/2020 4.8  3.5 - 5.1 mmol/L Final    Chloride 10/14/2020 104  97 - 108 mmol/L Final    CO2 10/14/2020 29  21 - 32 mmol/L Final    Anion gap 10/14/2020 4* 5 - 15 mmol/L Final    Glucose 10/14/2020 103* 65 - 100 mg/dL Final    BUN 10/14/2020 12  6 - 20 MG/DL Final    Creatinine 10/14/2020 0.88  0.70 - 1.30 MG/DL Final    BUN/Creatinine ratio 10/14/2020 14  12 - 20   Final    GFR est AA 10/14/2020 >60  >60 ml/min/1.73m2 Final    GFR est non-AA 10/14/2020 >60  >60 ml/min/1.73m2 Final    Calcium 10/14/2020 9.3  8.5 - 10.1 MG/DL Final    Bilirubin, total 10/14/2020 0.3  0.2 - 1.0 MG/DL Final    ALT (SGPT) 10/14/2020 37  12 - 78 U/L Final    AST (SGOT) 10/14/2020 42* 15 - 37 U/L Final    Alk.  phosphatase 10/14/2020 79  45 - 117 U/L Final    Protein, total 10/14/2020 6.8  6.4 - 8.2 g/dL Final    Albumin 10/14/2020 3.4* 3.5 - 5.0 g/dL Final    Globulin 10/14/2020 3.4  2.0 - 4.0 g/dL Final    A-G Ratio 10/14/2020 1.0* 1.1 - 2.2   Final    ALCOHOL(ETHYL),SERUM 10/14/2020 <10  <10 MG/DL Final    Color 10/14/2020 YELLOW/STRAW    Final    Appearance 10/14/2020 CLEAR  CLEAR   Final    Specific gravity 10/14/2020 1.010  1.003 - 1.030   Final    pH (UA) 10/14/2020 7.0  5.0 - 8.0   Final    Protein 10/14/2020 Negative  NEG mg/dL Final    Glucose 10/14/2020 Negative  NEG mg/dL Final    Ketone 10/14/2020 Negative  NEG mg/dL Final    Bilirubin 10/14/2020 Negative  NEG   Final    Blood 10/14/2020 Negative  NEG   Final    Urobilinogen 10/14/2020 0.2  0.2 - 1.0 EU/dL Final    Nitrites 10/14/2020 Negative  NEG   Final    Leukocyte Esterase 10/14/2020 Negative  NEG   Final    WBC 10/14/2020 0-4  0 - 4 /hpf Final    RBC 10/14/2020 0-5  0 - 5 /hpf Final    Epithelial cells 10/14/2020 FEW  FEW /lpf Final    Bacteria 10/14/2020 Negative  NEG /hpf Final    UA:UC IF INDICATED 10/14/2020 CULTURE NOT INDICATED BY UA RESULT  CNI   Final    AMPHETAMINES 10/14/2020 Negative  NEG   Final    BARBITURATES 10/14/2020 Negative  NEG   Final    BENZODIAZEPINES 10/14/2020 Negative  NEG   Final    COCAINE 10/14/2020 Negative  NEG   Final    METHADONE 10/14/2020 Negative  NEG   Final    OPIATES 10/14/2020 Negative  NEG   Final    PCP(PHENCYCLIDINE) 10/14/2020 Negative  NEG   Final    THC (TH-CANNABINOL) 10/14/2020 Positive* NEG   Final    Drug screen comment 10/14/2020 (NOTE)   Final    Specimen source 10/14/2020 Nasopharyngeal    Final    Specimen source 10/14/2020 Nasopharyngeal    Final    COVID-19 rapid test 10/14/2020 Not detected  NOTD   Final    Specimen type 10/14/2020 NP Swab    Final    Health status 10/14/2020 Symptomatic Testing    Final    Specimen source 10/14/2020 Nasopharyngeal    Final    SARS-CoV-2 10/14/2020 Not detected  NOTD   Final    Hemoglobin A1c 10/19/2020 5.6  4.0 - 5.6 % Final    Est. average glucose 10/19/2020 114  mg/dL Final    LIPID PROFILE 10/19/2020        Final    Cholesterol, total 10/19/2020 195  <200 MG/DL Final    Triglyceride 10/19/2020 345* <150 MG/DL Final    HDL Cholesterol 10/19/2020 41  MG/DL Final    LDL, calculated 10/19/2020 85  0 - 100 MG/DL Final    VLDL, calculated 10/19/2020 69  MG/DL Final    CHOL/HDL Ratio 10/19/2020 4.8  0.0 - 5.0   Final     No results found. DISPOSITION:    Home. Patient to f/u with psychiatric, and psychotherapy appointments. Patient is to f/u with internist as directed.                FOLLOW-UP CARE:    Activity as tolerated  Regular diet  Wound Care: none needed. Follow-up Information     Follow up With Specialties Details Why Contact Info    [facility]  Call in 1 week  Hebergarygary Loza will call you at [phone] on Monday or Tuesday next week [address] [place] [address], [address] [facility]  [phone] (main line)  [phone]    None    None (395) Patient stated that they have no PCP                   PROGNOSIS:   Fair ---- based on nature of patient's pathology/ies and treatment compliance issues. Prognosis is greatly dependent upon patient's ability to remain sober and to follow up with scheduled appointments as well as to comply with psychiatric medications as prescribed. DISCHARGE MEDICATIONS:     Informed consent given for the use of following psychotropic medications:  Current Discharge Medication List      START taking these medications    Details   OLANZapine (ZyPREXA) 5 mg tablet Take 1 Tab by mouth two (2) times a day. Indications: hugo associated with bipolar disorder  Qty: 60 Tab, Refills: 0      OXcarbazepine (TRILEPTAL) 300 mg tablet Take 1 Tab by mouth two (2) times a day. Indications: bipolar disorder  Qty: 60 Tab, Refills: 0      traZODone (DESYREL) 50 mg tablet Take 1 Tab by mouth nightly as needed for Sleep (For insomnia). Indications: insomnia associated with depression  Qty: 30 Tab, Refills: 0      hydrOXYzine HCL (ATARAX) 50 mg tablet Take 1 Tab by mouth three (3) times daily as needed for Anxiety for up to 10 days. Indications: anxious  Qty: 30 Tab, Refills: 0         CONTINUE these medications which have CHANGED    Details   benztropine (COGENTIN) 1 mg tablet Take 1 Tab by mouth two (2) times daily as needed (For mild-moderate extrapyramidal symptoms).  Indications: extrapyramidal symptoms as a result of taking the medication  Qty: 60 Tab, Refills: 0         STOP taking these medications       haloperidoL (HALDOL) 10 mg tablet Comments:   Reason for Stopping:         divalproex ER (Depakote ER) 250 mg ER tablet Comments:   Reason for Stopping:                      A coordinated, multidisplinary treatment team round was conducted with Sammie Alto is done daily here at [facility]. This team consists of the nurse, psychiatric unit pharmacist,  and Alejandro Lauren. I have spent greater than 35 minutes on discharge work.     Signed:  Norberto Camacho MD  10/23/2020

## 2020-10-23 NOTE — GROUP NOTE
SATISH  GROUP DOCUMENTATION INDIVIDUAL Group Therapy Note Date: 10/23/2020 Group Start Time: 1500 Group End Time: 4430 Group Topic: Recreational/Music Therapy [facility] [place] [address] ACUTE BEHAV [facility] [place], [address] GROUP DOCUMENTATION GROUP Group Therapy Note Attendees: 3 Attendance: Attended Patient's Goal:  To concentrate on selected task Interventions/techniques: Supported-crafts,games,music Follows Directions: Followed directions Interactions: Interacted appropriately Mental Status: Calm Behavior/appearance: Attentive and Cooperative Goals Achieved: Able to engage in interactions and Able to listen to others Additional Notes:  Elected to listen to music Rad Harvey

## 2020-10-23 NOTE — PROGRESS NOTES
Problem: Altered Thought Process (Adult/Pediatric)  Goal: *STG: Participates in treatment plan  Outcome: Progressing Towards Goal  Goal: *STG: Remains safe in hospital  Outcome: Progressing Towards Goal  Goal: *STG: Complies with medication therapy  Outcome: Progressing Towards Goal  Goal: *STG: Attends activities and groups  Outcome: Progressing Towards Goal  Goal: Interventions  Outcome: Progressing Towards Goal     0720 This writer received report from the outgoing nurse. 0900 Patient in hallway ambulating. Patient presents with a cooperative and distracted attitude. Patient's affect is animated. Patient's mood is euthymic. Patient's thought content is delusions. Patient's speech is pressured. Patient denied SI, HI, and AVH. Patient Patient does have blisters on bottom of both feet. Patient encouraged to express his concerns regarding his blisters to his attending physician. Patient ate his breakfast.     1032 Patient received 600 motrin for pain from [facility] staff. 1130 Patient ambulating in hallway. Patient continues to have pressured speech. Patient's affect is animated. 1201 Patient to be discharged today. 1222 Patient complained of /10 bilateral feet pain. Patient received prn 650 mg tylenol for pain. 1515 This writer reviewed the patient's discharge paper work with the patient. Patient signed and verbalized understanding of the information discussed. Patient to receive his valuables and personal belongings at discharge. 1745 Patient discharged from unit. Patient's evening meds held as patient was discharged from unit. Patient was transported home via family. Patient's valuables and belongings returned to patient at discharge. Patient transported home with prescriptions.

## 2020-10-23 NOTE — BH NOTES
Behavioral Health Treatment Team Note      Patient goal(s) for today: continue taking meds as prescribe, engage in unit activities, participate in hygiene/ADLs, prepare for discharge, follow directions from staff, contact support team  Treatment team focus/goals: continue adjusting meds as needed, discharge planning, update support system     Progress note:    Patient discharge oriented and encouraged to be patient as he is eager to leave immediately.  Patient expressed frustration when told his cousin, Manju Mishra, could not come until 4:00PM. Patient agreeable to discharge plan to follow up with Luis Fernando Holley and agrees to continue taking his medication as prescribed.      KS                     [id number] LOS: 10/23/20     Insurance info/prescription coverage:  [place] Medicare (Part A&B to be confirmed by Pt Access)  Date of last family Marah Showers (daughter, [phone], contact 10/21/20), Linsey Cervantes (aunt, [phone]), last contact 10/23/20  Family requesting physician contact today:  no  Discharge plan:  patient returning to aunt's home  Guns in the home:  no   Outpatient provider(s):  [facility]: Ms Nadir Jensen ([address] [facility])     Participating treatment team members: WILMER Farley, Dr. Aayush Hernandez MD*

## 2020-10-23 NOTE — DISCHARGE INSTRUCTIONS
DISCHARGE SUMMARY    NAME:Thuan Bhatti  : 1960  MRN: [id number]    The patient Enrike Paige exhibits the ability to control behavior in a less restrictive environment. Patient's level of functioning is improving. No assaultive/destructive behavior has been observed for the past 24 hours. No suicidal/homicidal threat or behavior has been observed for the past 24 hours. There is no evidence of serious medication side effects. Patient has not been in physical or protective restraints for at least the past 24 hours. If weapons involved, how are they secured? No weapons concerns    Is patient aware of and in agreement with discharge plan? yes    Arrangements for medication:  Prescriptions given to patient, given a weeks supply or 30 day supply. Copy of discharge instructions to provider?:  yes    Arrangements for transportation home:  Cousin to transport him home    Keep all follow up appointments as scheduled, continue to take prescribed medications per physician instructions. Mental health crisis number:  844 or your local mental health crisis line number at  [phone]    If I feel I am at risk of hurting myself or others, I will call the crisis office and speak with a crisis worker who will assist me during my crisis. [address]  [phone]  [address] [place] [address] [phone]  [facility]  [phone]      Patient Education        Bipolar Disorder: Care Instructions  Your Care Instructions     Bipolar disorder is an illness that causes extreme mood changes, from times of very high energy (manic episodes) to times of depression. But many people with bipolar disorder show only the symptoms of depression. These moods may cause problems with your work, school, family life, friendships, and how well you function.   This disease is also called manic-depression. There is no cure for bipolar disorder, but it can be helped with medicines. Counseling may also help. It is important to take your medicines exactly as prescribed, even when you feel well. You may need lifelong treatment. Follow-up care is a key part of your treatment and safety. Be sure to make and go to all appointments, and call your doctor if you are having problems. It's also a good idea to know your test results and keep a list of the medicines you take. How can you care for yourself at home? · Be safe with medicines. Take your medicines exactly as prescribed. Do not stop or change a medicine without talking to your doctor first. Kenyetta Daughters and your doctor may need to try different combinations of medicines to find what works for you. · Take your medicines on schedule to keep your moods even. When you feel good, you may think that you do not need your medicines. But it is important to keep taking them. · Go to your counseling sessions. Call and talk with your counselor if you can't go to a session or if you don't think the sessions are helping. Do not just stop going. · Get at least 30 minutes of activity on most days of the week. Walking is a good choice. You also may want to do other things, such as running, swimming, or cycling. · Get enough sleep. Keep your room dark and quiet. Try to go to bed at the same time every night. · Eat a healthy diet. This includes whole grains, dairy, fruits, vegetables, and protein. Eat foods from each of these groups. · Try to lower your stress. Manage your time, build a strong support system, and lead a healthy lifestyle. To lower your stress, try physical activity, slow deep breathing, or getting a massage. · Do not use alcohol, marijuana, or illegal drugs. · Learn the early signs of your mood changes. You can then take steps to help yourself feel better. · Ask for help from friends and family when you need it.  You may need help with daily chores when you are depressed. When you are manic, you may need support to control your high energy levels. What should you do if someone in your family has bipolar disorder? · Learn about the disease and signs it's getting worse. · Remind your family member you love them. · Make a plan with all family members about how to take care of your loved one when symptoms are bad. · Remind yourself it will take time for changes to occur. · Try not to blame yourself for the disease. · Know your legal rights and the legal rights of your family member. Support groups or counselors can help with this information. · Take care of yourself. Keep up with your interests, such as career, hobbies, and friends. Use exercise, positive self-talk, deep breathing, and other relaxing exercises to help lower your stress. · Give yourself time to grieve. You may need to deal with emotions such as anger, fear, and frustration. · If you are having a hard time with your feelings or with your relationship with your family member, talk with a counselor. When should you call for help? Call 911 anytime you think you may need emergency care. For example, call if:    · You feel like hurting yourself or someone else.     · Someone who has bipolar disorder displays dangerous behavior, and you think the person might hurt himself or herself or someone else. Call your doctor now or seek immediate medical care if:    · You hear voices.     · Someone you know has bipolar disorder and talks about suicide. Keep the numbers for these national suicide hotlines: 8-315-574-TALK (9-370.110.5832) and 8-605-RNPYMFK (7-918.176.8563). If a suicide threat seems real, with a specific plan and a way to carry it out, stay with the person, or ask someone you trust to stay with the person, until you can get help.     · Someone you know has bipolar disorder and:  ? Starts to give away possessions. ? Is using illegal drugs or drinking alcohol heavily.   ? Talks or writes about death, including writing suicide notes or talking about guns, knives, or pills. ? Talks or writes about hurting someone else. ? Starts to spend a lot of time alone. ? Acts very aggressively or suddenly appears calm. ? Talks about beliefs that are not based in reality (delusions). Watch closely for changes in your health, and be sure to contact your doctor if:    · You cannot go to your counseling sessions. Where can you learn more? Go to http://www.buchanan.com/  Enter K052 in the search box to learn more about \"Bipolar Disorder: Care Instructions. \"  Current as of: January 31, 2020               Content Version: 12.6  © 4654-2165 [employer]. Care instructions adapted under license by [employer] (which disclaims liability or warranty for this information). If you have questions about a medical condition or this instruction, always ask your healthcare professional. [address] any warranty or liability for your use of this information.

## 2020-10-23 NOTE — GROUP NOTE
[facility]  GROUP DOCUMENTATION INDIVIDUAL Group Therapy Note Date: 10/23/2020 Group Start Time: 1100 Group End Time: 1200 Group Topic: Topic Group [address] ACUTE BEHAV [facility], [facility] GROUP DOCUMENTATION GROUP Group Therapy Note Attendees: 2 Attendance: Did not attend Patient's Goal: Interventions/techniques: 
Yefri Reagan

## 2020-10-23 NOTE — PROGRESS NOTES
Problem: Falls - Risk of  Goal: *Absence of Falls  Description: Document Mark Heath Fall Risk and appropriate interventions in the flowsheet.   Outcome: Progressing Towards Goal  Note: Fall Risk Interventions:            Medication Interventions: Teach patient to arise slowly         History of Falls Interventions: Room close to nurse's station         Problem: Altered Thought Process (Adult/Pediatric)  Goal: *STG: Participates in treatment plan  Outcome: Progressing Towards Goal  Goal: *STG: Remains safe in hospital  Outcome: Progressing Towards Goal  Goal: *STG: Complies with medication therapy  Outcome: Progressing Towards Goal

## 2022-02-03 ENCOUNTER — NURSE TRIAGE (OUTPATIENT)
Dept: OTHER | Facility: CLINIC | Age: 62
End: 2022-02-03

## 2022-02-03 ENCOUNTER — TELEPHONE (OUTPATIENT)
Dept: INTERNAL MEDICINE CLINIC | Age: 62
End: 2022-02-03

## 2022-02-03 NOTE — TELEPHONE ENCOUNTER
The patient called and verified them by name and date of birth. He is not a patient here and I informed him that there is not much we can do. He stated using heroin about 4 months ago and quit cold turkey about 6 days ago. Has been though the diarrhea & vomiting portion of withdrawals but is now experiencing body pain, mind racing & anxiety. He has spoken with other addicts and they mentioned some alternatives to heroin. Has overdosed and was bought back by narcan. He does not want to us heroin anymore and would like help. I suggested going to and ER today because they can help him get set up with treatment. We do not have any in person appointments until the end of March but I may be able to get him in for a virtual next week. He will call us back later to let us know if he can do a virtual and will proceed to the ER.

## 2022-02-03 NOTE — TELEPHONE ENCOUNTER
Received call from Karen Flores at [facility] with [employer] Complaint; Patient with Red Flag Complaint requesting to establish care with. Subjective: Caller states \"about 4 months ago I met some new people and I started doing heroin, I did it for 3-4 months and 5 days ago I decided I didn't want to do that anymore. I have went through the diarrhea, vomiting, and not eating. My mind is racing and my feelings are all mixed up and I haven't slept in 4 days and I don't know what is going on. I am jittery and shaking. \"     \"I did go to the ED the other night due to SOB\"    Current Symptoms: heroin withdrawal      Onset: 5 day ago; sudden    Pain Severity: 10/10; aching; all over pain; \"in my bones\"    Temperature: denies     What has been tried: nothing    Recommended disposition: to be seen today or tomorrow    Care advice provided, patient verbalizes understanding; denies any other questions or concerns; instructed to call back for any new or worsening symptoms. Writer provided warm transfer to Houston Raza at [facility] for appointment scheduling    Attention Provider: Thank you for allowing me to participate in the care of your patient. The patient was connected to triage in response to information provided to the [facility]. Please do not respond through this encounter as the response is not directed to a shared pool.     Reason for Disposition   Requesting help for narcotic (opioid) withdrawal symptoms (e.g., anxiety, body aches, diarrhea, gooseflesh, sweating, vomiting)    Protocols used: SUBSTANCE USE AND PROBLEMS-ADULT-OH

## 2022-03-19 PROBLEM — F31.9 BIPOLAR 1 DISORDER (HCC): Status: ACTIVE | Noted: 2020-10-14

## 2022-04-02 ENCOUNTER — HOSPITAL ENCOUNTER (EMERGENCY)
Age: 62
Discharge: HOME | End: 2022-04-02
Attending: EMERGENCY MEDICINE

## 2022-04-02 ENCOUNTER — APPOINTMENT (OUTPATIENT)
Dept: CT IMAGING | Age: 62
End: 2022-04-02
Attending: EMERGENCY MEDICINE

## 2022-04-02 ENCOUNTER — APPOINTMENT (OUTPATIENT)
Dept: GENERAL RADIOLOGY | Age: 62
End: 2022-04-02
Attending: EMERGENCY MEDICINE

## 2022-04-02 VITALS
HEIGHT: 69 IN | OXYGEN SATURATION: 97 % | WEIGHT: 141.31 LBS | HEART RATE: 77 BPM | TEMPERATURE: 98.2 F | BODY MASS INDEX: 20.93 KG/M2 | RESPIRATION RATE: 14 BRPM | DIASTOLIC BLOOD PRESSURE: 89 MMHG | SYSTOLIC BLOOD PRESSURE: 126 MMHG

## 2022-04-02 DIAGNOSIS — S22.41XA CLOSED FRACTURE OF MULTIPLE RIBS OF RIGHT SIDE, INITIAL ENCOUNTER: Primary | ICD-10-CM

## 2022-04-02 DIAGNOSIS — S62.102A CLOSED FRACTURE OF LEFT WRIST, INITIAL ENCOUNTER: ICD-10-CM

## 2022-04-02 LAB
ALBUMIN SERPL-MCNC: 3.8 G/DL (ref 3.5–5)
ALBUMIN/GLOB SERPL: 1 {RATIO} (ref 1.1–2.2)
ALP SERPL-CCNC: 78 U/L (ref 45–117)
ALT SERPL-CCNC: 17 U/L (ref 12–78)
ANION GAP SERPL CALC-SCNC: 9 MMOL/L (ref 5–15)
AST SERPL-CCNC: 10 U/L (ref 15–37)
BASOPHILS # BLD: 0.1 K/UL (ref 0–0.1)
BASOPHILS NFR BLD: 1 % (ref 0–1)
BILIRUB SERPL-MCNC: 0.7 MG/DL (ref 0.2–1)
BUN SERPL-MCNC: 9 MG/DL (ref 6–20)
BUN/CREAT SERPL: 9 (ref 12–20)
CALCIUM SERPL-MCNC: 9 MG/DL (ref 8.5–10.1)
CHLORIDE SERPL-SCNC: 104 MMOL/L (ref 97–108)
CO2 SERPL-SCNC: 27 MMOL/L (ref 21–32)
COMMENT, HOLDF: NORMAL
CREAT SERPL-MCNC: 1 MG/DL (ref 0.7–1.3)
D DIMER PPP FEU-MCNC: 0.96 MG/L FEU (ref 0–0.65)
DIFFERENTIAL METHOD BLD: ABNORMAL
EOSINOPHIL # BLD: 0.1 K/UL (ref 0–0.4)
EOSINOPHIL NFR BLD: 1 % (ref 0–7)
ERYTHROCYTE [DISTWIDTH] IN BLOOD BY AUTOMATED COUNT: 14 % (ref 11.5–14.5)
GLOBULIN SER CALC-MCNC: 3.8 G/DL (ref 2–4)
GLUCOSE SERPL-MCNC: 100 MG/DL (ref 65–100)
HCT VFR BLD AUTO: 42.8 % (ref 36.6–50.3)
HGB BLD-MCNC: 13.6 G/DL (ref 12.1–17)
IMM GRANULOCYTES # BLD AUTO: 0 K/UL (ref 0–0.04)
IMM GRANULOCYTES NFR BLD AUTO: 0 % (ref 0–0.5)
LYMPHOCYTES # BLD: 1.1 K/UL (ref 0.8–3.5)
LYMPHOCYTES NFR BLD: 12 % (ref 12–49)
MCH RBC QN AUTO: 28.3 PG (ref 26–34)
MCHC RBC AUTO-ENTMCNC: 31.8 G/DL (ref 30–36.5)
MCV RBC AUTO: 89 FL (ref 80–99)
MONOCYTES # BLD: 0.4 K/UL (ref 0–1)
MONOCYTES NFR BLD: 5 % (ref 5–13)
NEUTS SEG # BLD: 7.6 K/UL (ref 1.8–8)
NEUTS SEG NFR BLD: 81 % (ref 32–75)
NRBC # BLD: 0 K/UL (ref 0–0.01)
NRBC BLD-RTO: 0 PER 100 WBC
PLATELET # BLD AUTO: 374 K/UL (ref 150–400)
PMV BLD AUTO: 8.6 FL (ref 8.9–12.9)
POTASSIUM SERPL-SCNC: 4.1 MMOL/L (ref 3.5–5.1)
PROT SERPL-MCNC: 7.6 G/DL (ref 6.4–8.2)
RBC # BLD AUTO: 4.81 M/UL (ref 4.1–5.7)
SAMPLES BEING HELD,HOLD: NORMAL
SODIUM SERPL-SCNC: 140 MMOL/L (ref 136–145)
TROPONIN-HIGH SENSITIVITY: 7 NG/L (ref 0–76)
WBC # BLD AUTO: 9.4 K/UL (ref 4.1–11.1)

## 2022-04-02 PROCEDURE — 74011250636 HC RX REV CODE- 250/636: Performed by: EMERGENCY MEDICINE

## 2022-04-02 PROCEDURE — 75810000053 HC SPLINT APPLICATION

## 2022-04-02 PROCEDURE — 71046 X-RAY EXAM CHEST 2 VIEWS: CPT

## 2022-04-02 PROCEDURE — 71275 CT ANGIOGRAPHY CHEST: CPT

## 2022-04-02 PROCEDURE — 73110 X-RAY EXAM OF WRIST: CPT

## 2022-04-02 PROCEDURE — 96374 THER/PROPH/DIAG INJ IV PUSH: CPT

## 2022-04-02 PROCEDURE — 80053 COMPREHEN METABOLIC PANEL: CPT

## 2022-04-02 PROCEDURE — 99285 EMERGENCY DEPT VISIT HI MDM: CPT

## 2022-04-02 PROCEDURE — 84484 ASSAY OF TROPONIN QUANT: CPT

## 2022-04-02 PROCEDURE — 36415 COLL VENOUS BLD VENIPUNCTURE: CPT

## 2022-04-02 PROCEDURE — 74011000636 HC RX REV CODE- 636: Performed by: EMERGENCY MEDICINE

## 2022-04-02 PROCEDURE — 85025 COMPLETE CBC W/AUTO DIFF WBC: CPT

## 2022-04-02 PROCEDURE — 85379 FIBRIN DEGRADATION QUANT: CPT

## 2022-04-02 PROCEDURE — 93005 ELECTROCARDIOGRAM TRACING: CPT

## 2022-04-02 RX ORDER — NAPROXEN 500 MG/1
500 TABLET ORAL
Qty: 20 TABLET | Refills: 0 | Status: SHIPPED | OUTPATIENT
Start: 2022-04-02

## 2022-04-02 RX ORDER — KETOROLAC TROMETHAMINE 30 MG/ML
15 INJECTION, SOLUTION INTRAMUSCULAR; INTRAVENOUS
Status: COMPLETED | OUTPATIENT
Start: 2022-04-02 | End: 2022-04-02

## 2022-04-02 RX ADMIN — KETOROLAC TROMETHAMINE 15 MG: 30 INJECTION, SOLUTION INTRAMUSCULAR; INTRAVENOUS at 11:08

## 2022-04-02 RX ADMIN — IOPAMIDOL 100 ML: 755 INJECTION, SOLUTION INTRAVENOUS at 11:43

## 2022-04-02 NOTE — ED PROVIDER NOTES
The history is provided by the patient and a relative. Chest Pain (Angina)   The current episode started more than 2 days ago. The problem has not changed since onset. The problem occurs constantly. The pain is associated with breathing. The pain is moderate. The quality of the pain is described as pleuritic. The pain radiates to the upper back. Associated symptoms include back pain and shortness of breath. Pertinent negatives include no abdominal pain, no claudication, no cough, no diaphoresis, no dizziness, no exertional chest pressure, no fever, no headaches, no hemoptysis, no irregular heartbeat, no leg pain, no lower extremity edema, no malaise/fatigue, no nausea, no numbness, no orthopnea, no palpitations, no vomiting and no weakness. He has tried nothing for the symptoms. The treatment provided no relief. His past medical history does not include aneurysm, cancer, DM, DVT, HTN, PE or CHF. Past Medical History:   Diagnosis Date    Bipolar 1 disorder ([facility]     Depression     Emilia ([facility]     Mood disorder ([facility])     Psychotic disorder ([facility]     Substance abuse ([facility]        Past Surgical History:   Procedure Laterality Date    NH ABDOMEN SURGERY PROC UNLISTED      hernia         History reviewed. No pertinent family history.     Social History     Socioeconomic History    Marital status:      Spouse name: Not on file    Number of children: Not on file    Years of education: Not on file    Highest education level: Not on file   Occupational History    Not on file   Tobacco Use    Smoking status: Current Every Day Smoker     Packs/day: 0.50    Smokeless tobacco: Never Used   Substance and Sexual Activity    Alcohol use: No    Drug use: Not Currently     Types: Marijuana, Cocaine, Prescription    Sexual activity: Not Currently   Other Topics Concern    Not on file   Social History Narrative    ** Merged History Encounter **          Social Determinants of Health     Financial Resource Strain:     Difficulty of Paying Living Expenses: Not on file   Food Insecurity:     Worried About Running Out of Food in the Last Year: Not on file    Dayo of Food in the Last Year: Not on file   Transportation Needs:     Lack of Transportation (Medical): Not on file    Lack of Transportation (Non-Medical): Not on file   Physical Activity:     Days of Exercise per Week: Not on file    Minutes of Exercise per Session: Not on file   Stress:     Feeling of Stress : Not on file   Social Connections:     Frequency of Communication with Friends and Family: Not on file    Frequency of Social Gatherings with Friends and Family: Not on file    Attends Restoration Services: Not on file    Active Member of [address] [place] [address] or Organizations: Not on file    Attends Club or Organization Meetings: Not on file    Marital Status: Not on file   Intimate Partner Violence:     Fear of Current or Ex-Partner: Not on file    Emotionally Abused: Not on file    Physically Abused: Not on file    Sexually Abused: Not on file   Housing Stability:     Unable to Pay for Housing in the Last Year: Not on file    Number of [place] in the Last Year: Not on file    Unstable Housing in the Last Year: Not on file         ALLERGIES: Bee sting [sting, bee]    Review of Systems   Constitutional: Negative for activity change, chills, diaphoresis, fever and malaise/fatigue. HENT: Negative for nosebleeds, sore throat, trouble swallowing and voice change. Eyes: Negative for visual disturbance. Respiratory: Positive for shortness of breath. Negative for cough and hemoptysis. Cardiovascular: Positive for chest pain. Negative for palpitations, orthopnea and claudication. Gastrointestinal: Negative for abdominal pain, constipation, diarrhea, nausea and vomiting. Genitourinary: Negative for difficulty urinating, dysuria, hematuria and urgency. Musculoskeletal: Positive for arthralgias and back pain.  Negative for neck pain and neck stiffness. Skin: Negative for color change. Allergic/Immunologic: Negative for immunocompromised state. Neurological: Negative for dizziness, seizures, syncope, weakness, light-headedness, numbness and headaches. Psychiatric/Behavioral: Negative for behavioral problems, confusion, hallucinations, self-injury and suicidal ideas. Vitals:    04/02/22 1026   BP: 126/89   Pulse: 77   Resp: 14   Temp: 98.2 °F (36.8 °C)   SpO2: 97%   Weight: 64.1 kg (141 lb 5 oz)   Height: 5' 9\" (1.753 m)            Physical Exam  Vitals and nursing note reviewed. Constitutional:       General: He is not in acute distress. Appearance: He is well-developed. He is not diaphoretic. HENT:      Head: Normocephalic and atraumatic. Eyes:      Pupils: Pupils are equal, round, and reactive to light. Cardiovascular:      Rate and Rhythm: Normal rate and regular rhythm. Heart sounds: Normal heart sounds. No murmur heard. No friction rub. No gallop. Pulmonary:      Effort: Pulmonary effort is normal. No respiratory distress. Breath sounds: Normal breath sounds. No wheezing. Chest:      Chest wall: Tenderness present. No deformity. Abdominal:      General: Bowel sounds are normal. There is no distension. Palpations: Abdomen is soft. Tenderness: There is no abdominal tenderness. There is no guarding or rebound. Musculoskeletal:      Left wrist: Tenderness present. Decreased range of motion. Cervical back: Normal range of motion and neck supple. Back:    Skin:     General: Skin is warm. Findings: No rash. Neurological:      Mental Status: He is alert and oriented to person, place, and time. Psychiatric:         Behavior: Behavior normal.         Thought Content:  Thought content normal.         Judgment: Judgment normal.          MDM     This is a 21-year-old male with past medical history, review of systems, physical exam as above, presenting with complaints of pleuritic chest pain, back pain, left wrist pain. Patient states that Tuesday night into Wednesday morning he was \"partying\" states that he was taking doses of Valium, and he woke up having CPR performed on him. He states he has been having pleuritic chest pain since, as well as left-sided back pain and left wrist pain. Patient is unclear as to the circumstances it would have caused left wrist pain. He denies medical problems, or daily medications. He denies a history of coronary artery disease, PE or DVT. He states he has been taking over-the-counter pain medication with minimal relief, states that left wrist pain improved, as has swelling, however pain with range of motion persists. Physical exam is remarkable for a well-appearing middle-age male, in no acute distress noted to be normotensive, afebrile without tachycardia, satting well on room air. He has tenderness to palpation throughout the chest wall without obvious deformity, clear breath sounds to auscultation, tenderness over the left scapula, left wrist with tenderness over the distal radius and ulna, decreased range of motion secondary to pain without palpable abnormality, pulse sensation intact. Differential includes musculoskeletal pain, ACS. Plan to provide pain control, obtain CMP, CBC, EKG, chest x-ray, cardiac enzymes, left wrist films. We will reassess, and make a disposition.     Procedures

## 2022-04-02 NOTE — ED TRIAGE NOTES
Patient arrives ambulatory to ed with complaints of SOB since Tuesday. Patient states he went unresponsive and nephew started chest compressions. SOB started after compressions. Patient states he took approx 5 valium tablets of unknown mg Tuesday prior to. Denies any alcohol use.  EMS was on seen and patient refused transport to hospital.

## 2022-04-02 NOTE — ED NOTES
Pt d/c'd home. IV d/c'd cath intact. Pt splinted per MD orders by Tech. Pt given d/c instructions and verbalized understanding.   Declined w/c and left ambulatory in care of self

## 2022-04-03 LAB
ATRIAL RATE: 73 BPM
CALCULATED P AXIS, ECG09: 59 DEGREES
CALCULATED R AXIS, ECG10: 86 DEGREES
CALCULATED T AXIS, ECG11: 56 DEGREES
DIAGNOSIS, 93000: NORMAL
P-R INTERVAL, ECG05: 138 MS
Q-T INTERVAL, ECG07: 358 MS
QRS DURATION, ECG06: 92 MS
QTC CALCULATION (BEZET), ECG08: 394 MS
VENTRICULAR RATE, ECG03: 73 BPM

## 2023-05-02 ENCOUNTER — HOSPITAL ENCOUNTER (EMERGENCY)
Age: 63
Discharge: OTHER INSTITUTION | End: 2023-05-04
Attending: EMERGENCY MEDICINE
Payer: MEDICARE

## 2023-05-02 DIAGNOSIS — F23 ACUTE PSYCHOSIS (HCC): Primary | ICD-10-CM

## 2023-05-02 DIAGNOSIS — F20.9 SCHIZOPHRENIA, UNSPECIFIED TYPE (HCC): ICD-10-CM

## 2023-05-02 LAB
ALBUMIN SERPL-MCNC: 4.1 G/DL (ref 3.5–5)
ALBUMIN/GLOB SERPL: 1.2 (ref 1.1–2.2)
ALP SERPL-CCNC: 80 U/L (ref 45–117)
ALT SERPL-CCNC: 79 U/L (ref 12–78)
AMPHET UR QL SCN: NEGATIVE
ANION GAP SERPL CALC-SCNC: 11 MMOL/L (ref 5–15)
APPEARANCE UR: CLEAR
AST SERPL-CCNC: 119 U/L (ref 15–37)
BACTERIA URNS QL MICRO: NEGATIVE /HPF
BARBITURATES UR QL SCN: NEGATIVE
BASOPHILS # BLD: 0.1 K/UL (ref 0–0.1)
BASOPHILS NFR BLD: 1 % (ref 0–1)
BENZODIAZ UR QL: NEGATIVE
BILIRUB SERPL-MCNC: 0.7 MG/DL (ref 0.2–1)
BILIRUB UR QL: NEGATIVE
BUN SERPL-MCNC: 20 MG/DL (ref 6–20)
BUN/CREAT SERPL: 18 (ref 12–20)
CALCIUM SERPL-MCNC: 9.2 MG/DL (ref 8.5–10.1)
CANNABINOIDS UR QL SCN: POSITIVE
CHLORIDE SERPL-SCNC: 102 MMOL/L (ref 97–108)
CO2 SERPL-SCNC: 25 MMOL/L (ref 21–32)
COCAINE UR QL SCN: NEGATIVE
COLOR UR: ABNORMAL
CREAT SERPL-MCNC: 1.1 MG/DL (ref 0.7–1.3)
DIFFERENTIAL METHOD BLD: ABNORMAL
DRUG SCRN COMMENT,DRGCM: ABNORMAL
EOSINOPHIL # BLD: 0.1 K/UL (ref 0–0.4)
EOSINOPHIL NFR BLD: 1 % (ref 0–7)
EPITH CASTS URNS QL MICRO: ABNORMAL /LPF
ERYTHROCYTE [DISTWIDTH] IN BLOOD BY AUTOMATED COUNT: 14.3 % (ref 11.5–14.5)
ETHANOL SERPL-MCNC: <10 MG/DL
FLUAV RNA SPEC QL NAA+PROBE: NOT DETECTED
FLUBV RNA SPEC QL NAA+PROBE: NOT DETECTED
GLOBULIN SER CALC-MCNC: 3.4 G/DL (ref 2–4)
GLUCOSE SERPL-MCNC: 131 MG/DL (ref 65–100)
GLUCOSE UR STRIP.AUTO-MCNC: NEGATIVE MG/DL
HCT VFR BLD AUTO: 37.9 % (ref 36.6–50.3)
HGB BLD-MCNC: 12.3 G/DL (ref 12.1–17)
HGB UR QL STRIP: NEGATIVE
IMM GRANULOCYTES # BLD AUTO: 0 K/UL (ref 0–0.04)
IMM GRANULOCYTES NFR BLD AUTO: 0 % (ref 0–0.5)
KETONES UR QL STRIP.AUTO: 40 MG/DL
LEUKOCYTE ESTERASE UR QL STRIP.AUTO: NEGATIVE
LYMPHOCYTES # BLD: 1.4 K/UL (ref 0.8–3.5)
LYMPHOCYTES NFR BLD: 16 % (ref 12–49)
MCH RBC QN AUTO: 27.5 PG (ref 26–34)
MCHC RBC AUTO-ENTMCNC: 32.5 G/DL (ref 30–36.5)
MCV RBC AUTO: 84.6 FL (ref 80–99)
METHADONE UR QL: NEGATIVE
MONOCYTES # BLD: 0.8 K/UL (ref 0–1)
MONOCYTES NFR BLD: 9 % (ref 5–13)
NEUTS SEG # BLD: 6.2 K/UL (ref 1.8–8)
NEUTS SEG NFR BLD: 73 % (ref 32–75)
NITRITE UR QL STRIP.AUTO: NEGATIVE
NRBC # BLD: 0 K/UL (ref 0–0.01)
NRBC BLD-RTO: 0 PER 100 WBC
OPIATES UR QL: NEGATIVE
PCP UR QL: NEGATIVE
PH UR STRIP: 5.5 (ref 5–8)
PLATELET # BLD AUTO: 389 K/UL (ref 150–400)
PMV BLD AUTO: 8.6 FL (ref 8.9–12.9)
POTASSIUM SERPL-SCNC: 3.9 MMOL/L (ref 3.5–5.1)
PROT SERPL-MCNC: 7.5 G/DL (ref 6.4–8.2)
PROT UR STRIP-MCNC: NEGATIVE MG/DL
RBC # BLD AUTO: 4.48 M/UL (ref 4.1–5.7)
RBC #/AREA URNS HPF: ABNORMAL /HPF (ref 0–5)
SARS-COV-2 RNA RESP QL NAA+PROBE: NOT DETECTED
SODIUM SERPL-SCNC: 138 MMOL/L (ref 136–145)
SP GR UR REFRACTOMETRY: 1.02
UA: UC IF INDICATED,UAUC: ABNORMAL
UROBILINOGEN UR QL STRIP.AUTO: 1 EU/DL (ref 0.2–1)
WBC # BLD AUTO: 8.5 K/UL (ref 4.1–11.1)
WBC URNS QL MICRO: ABNORMAL /HPF (ref 0–4)

## 2023-05-02 PROCEDURE — 82077 ASSAY SPEC XCP UR&BREATH IA: CPT

## 2023-05-02 PROCEDURE — 80307 DRUG TEST PRSMV CHEM ANLYZR: CPT

## 2023-05-02 PROCEDURE — 96372 THER/PROPH/DIAG INJ SC/IM: CPT

## 2023-05-02 PROCEDURE — 85025 COMPLETE CBC W/AUTO DIFF WBC: CPT

## 2023-05-02 PROCEDURE — 81001 URINALYSIS AUTO W/SCOPE: CPT

## 2023-05-02 PROCEDURE — 36415 COLL VENOUS BLD VENIPUNCTURE: CPT

## 2023-05-02 PROCEDURE — 80053 COMPREHEN METABOLIC PANEL: CPT

## 2023-05-02 PROCEDURE — 74011250637 HC RX REV CODE- 250/637: Performed by: EMERGENCY MEDICINE

## 2023-05-02 PROCEDURE — 87636 SARSCOV2 & INF A&B AMP PRB: CPT

## 2023-05-02 PROCEDURE — 99285 EMERGENCY DEPT VISIT HI MDM: CPT

## 2023-05-02 PROCEDURE — 74011250637 HC RX REV CODE- 250/637: Performed by: PHYSICIAN ASSISTANT

## 2023-05-02 RX ORDER — TRAZODONE HYDROCHLORIDE 50 MG/1
100 TABLET ORAL
Status: COMPLETED | OUTPATIENT
Start: 2023-05-02 | End: 2023-05-02

## 2023-05-02 RX ORDER — OLANZAPINE 5 MG/1
5 TABLET ORAL ONCE
Status: COMPLETED | OUTPATIENT
Start: 2023-05-02 | End: 2023-05-02

## 2023-05-02 RX ORDER — TRAZODONE HYDROCHLORIDE 50 MG/1
50 TABLET ORAL
Status: DISCONTINUED | OUTPATIENT
Start: 2023-05-02 | End: 2023-05-04 | Stop reason: HOSPADM

## 2023-05-02 RX ADMIN — TRAZODONE HYDROCHLORIDE 50 MG: 50 TABLET ORAL at 20:35

## 2023-05-02 RX ADMIN — TRAZODONE HYDROCHLORIDE 100 MG: 50 TABLET ORAL at 22:10

## 2023-05-02 RX ADMIN — OLANZAPINE 5 MG: 5 TABLET, FILM COATED ORAL at 18:02

## 2023-05-03 PROCEDURE — 74011250636 HC RX REV CODE- 250/636: Performed by: EMERGENCY MEDICINE

## 2023-05-03 PROCEDURE — 96372 THER/PROPH/DIAG INJ SC/IM: CPT

## 2023-05-03 PROCEDURE — 74011250636 HC RX REV CODE- 250/636: Performed by: PHYSICIAN ASSISTANT

## 2023-05-03 RX ORDER — LORAZEPAM 2 MG/ML
2 INJECTION INTRAMUSCULAR
Status: COMPLETED | OUTPATIENT
Start: 2023-05-03 | End: 2023-05-03

## 2023-05-03 RX ORDER — DIPHENHYDRAMINE HYDROCHLORIDE 50 MG/ML
50 INJECTION, SOLUTION INTRAMUSCULAR; INTRAVENOUS
Status: COMPLETED | OUTPATIENT
Start: 2023-05-03 | End: 2023-05-03

## 2023-05-03 RX ORDER — LORAZEPAM 2 MG/ML
1 INJECTION INTRAMUSCULAR
Status: COMPLETED | OUTPATIENT
Start: 2023-05-03 | End: 2023-05-03

## 2023-05-03 RX ORDER — HALOPERIDOL 5 MG/ML
5 INJECTION INTRAMUSCULAR
Status: DISPENSED | OUTPATIENT
Start: 2023-05-03 | End: 2023-05-04

## 2023-05-03 RX ORDER — HALOPERIDOL 5 MG/ML
5 INJECTION INTRAMUSCULAR
Status: COMPLETED | OUTPATIENT
Start: 2023-05-03 | End: 2023-05-03

## 2023-05-03 RX ORDER — LORAZEPAM 1 MG/1
1 TABLET ORAL
Status: DISCONTINUED | OUTPATIENT
Start: 2023-05-03 | End: 2023-05-03

## 2023-05-03 RX ADMIN — DIPHENHYDRAMINE HYDROCHLORIDE 50 MG: 50 INJECTION, SOLUTION INTRAMUSCULAR; INTRAVENOUS at 23:25

## 2023-05-03 RX ADMIN — LORAZEPAM 2 MG: 2 INJECTION INTRAMUSCULAR at 23:25

## 2023-05-03 RX ADMIN — HALOPERIDOL LACTATE 5 MG: 5 INJECTION, SOLUTION INTRAMUSCULAR at 23:24

## 2023-05-03 RX ADMIN — LORAZEPAM 1 MG: 2 INJECTION INTRAMUSCULAR; INTRAVENOUS at 09:11

## 2023-05-04 ENCOUNTER — HOSPITAL ENCOUNTER (INPATIENT)
Facility: HOSPITAL | Age: 63
LOS: 19 days | Discharge: HOME | DRG: 885 | End: 2023-05-23
Attending: PSYCHIATRY & NEUROLOGY | Admitting: PSYCHIATRY & NEUROLOGY
Payer: MEDICARE

## 2023-05-04 ENCOUNTER — HOSPITAL ENCOUNTER (INPATIENT)
Age: 63
LOS: 2 days | Discharge: STILL ADMITTED OR ADMINISTRATIVE | End: 2023-05-06
Attending: PSYCHIATRY & NEUROLOGY | Admitting: PSYCHIATRY & NEUROLOGY
Payer: MEDICARE

## 2023-05-04 VITALS
WEIGHT: 150 LBS | BODY MASS INDEX: 22.22 KG/M2 | HEIGHT: 69 IN | SYSTOLIC BLOOD PRESSURE: 139 MMHG | RESPIRATION RATE: 18 BRPM | HEART RATE: 67 BPM | OXYGEN SATURATION: 97 % | DIASTOLIC BLOOD PRESSURE: 77 MMHG | TEMPERATURE: 98 F

## 2023-05-04 PROBLEM — F25.9 SCHIZOAFFECTIVE DISORDER (HCC): Status: ACTIVE | Noted: 2023-05-04

## 2023-05-04 PROCEDURE — 9990 CHARGE CONVERSION

## 2023-05-04 PROCEDURE — 74011250637 HC RX REV CODE- 250/637: Performed by: NURSE PRACTITIONER

## 2023-05-04 PROCEDURE — 93005 ELECTROCARDIOGRAM TRACING: CPT

## 2023-05-04 PROCEDURE — 74011250637 HC RX REV CODE- 250/637: Performed by: FAMILY MEDICINE

## 2023-05-04 PROCEDURE — 65220000003 HC RM SEMIPRIVATE PSYCH

## 2023-05-04 PROCEDURE — 74011250637 HC RX REV CODE- 250/637

## 2023-05-04 PROCEDURE — 74011250637 HC RX REV CODE- 250/637: Performed by: EMERGENCY MEDICINE

## 2023-05-04 RX ORDER — OLANZAPINE 5 MG/1
5 TABLET ORAL 2 TIMES DAILY
Status: DISCONTINUED | OUTPATIENT
Start: 2023-05-04 | End: 2023-05-04 | Stop reason: HOSPADM

## 2023-05-04 RX ORDER — OLANZAPINE 5 MG/1
5 TABLET ORAL
Status: DISCONTINUED | OUTPATIENT
Start: 2023-05-04 | End: 2023-05-06 | Stop reason: HOSPADM

## 2023-05-04 RX ORDER — LORAZEPAM 2 MG/ML
1 INJECTION INTRAMUSCULAR
Status: DISCONTINUED | OUTPATIENT
Start: 2023-05-04 | End: 2023-05-06 | Stop reason: HOSPADM

## 2023-05-04 RX ORDER — IBUPROFEN 200 MG
1 TABLET ORAL DAILY
Status: DISCONTINUED | OUTPATIENT
Start: 2023-05-04 | End: 2023-05-06 | Stop reason: HOSPADM

## 2023-05-04 RX ORDER — DM/P-EPHED/ACETAMINOPH/DOXYLAM 30-7.5/3
2 LIQUID (ML) ORAL
Status: DISCONTINUED | OUTPATIENT
Start: 2023-05-04 | End: 2023-05-06 | Stop reason: HOSPADM

## 2023-05-04 RX ORDER — OXCARBAZEPINE 150 MG/1
300 TABLET, FILM COATED ORAL 2 TIMES DAILY
Status: DISCONTINUED | OUTPATIENT
Start: 2023-05-04 | End: 2023-05-04 | Stop reason: HOSPADM

## 2023-05-04 RX ORDER — ACETAMINOPHEN 325 MG/1
650 TABLET ORAL
Status: DISCONTINUED | OUTPATIENT
Start: 2023-05-04 | End: 2023-05-06 | Stop reason: HOSPADM

## 2023-05-04 RX ORDER — IBUPROFEN 200 MG
1 TABLET ORAL DAILY
Status: DISCONTINUED | OUTPATIENT
Start: 2023-05-05 | End: 2023-05-04

## 2023-05-04 RX ORDER — DIPHENHYDRAMINE HYDROCHLORIDE 50 MG/ML
50 INJECTION, SOLUTION INTRAMUSCULAR; INTRAVENOUS
Status: DISCONTINUED | OUTPATIENT
Start: 2023-05-04 | End: 2023-05-06 | Stop reason: HOSPADM

## 2023-05-04 RX ORDER — BENZTROPINE MESYLATE 1 MG/1
1 TABLET ORAL
Status: DISCONTINUED | OUTPATIENT
Start: 2023-05-04 | End: 2023-05-04 | Stop reason: HOSPADM

## 2023-05-04 RX ORDER — HALOPERIDOL 5 MG/ML
5 INJECTION INTRAMUSCULAR
Status: DISCONTINUED | OUTPATIENT
Start: 2023-05-04 | End: 2023-05-06 | Stop reason: HOSPADM

## 2023-05-04 RX ORDER — BENZTROPINE MESYLATE 1 MG/1
1 TABLET ORAL
Status: DISCONTINUED | OUTPATIENT
Start: 2023-05-04 | End: 2023-05-06 | Stop reason: HOSPADM

## 2023-05-04 RX ORDER — QUETIAPINE FUMARATE 100 MG/1
100 TABLET, FILM COATED ORAL ONCE
Status: COMPLETED | OUTPATIENT
Start: 2023-05-04 | End: 2023-05-04

## 2023-05-04 RX ORDER — TRAZODONE HYDROCHLORIDE 50 MG/1
50 TABLET ORAL
Status: DISCONTINUED | OUTPATIENT
Start: 2023-05-04 | End: 2023-05-06 | Stop reason: HOSPADM

## 2023-05-04 RX ORDER — HYDROXYZINE 50 MG/1
50 TABLET, FILM COATED ORAL
Status: DISCONTINUED | OUTPATIENT
Start: 2023-05-04 | End: 2023-05-06 | Stop reason: HOSPADM

## 2023-05-04 RX ORDER — ADHESIVE BANDAGE
30 BANDAGE TOPICAL DAILY PRN
Status: DISCONTINUED | OUTPATIENT
Start: 2023-05-04 | End: 2023-05-06 | Stop reason: HOSPADM

## 2023-05-04 RX ADMIN — TRAZODONE HYDROCHLORIDE 50 MG: 50 TABLET ORAL at 20:29

## 2023-05-04 RX ADMIN — QUETIAPINE FUMARATE 100 MG: 100 TABLET ORAL at 22:12

## 2023-05-04 RX ADMIN — HYDROXYZINE HYDROCHLORIDE 50 MG: 50 TABLET, FILM COATED ORAL at 14:43

## 2023-05-04 RX ADMIN — OLANZAPINE 5 MG: 5 TABLET, FILM COATED ORAL at 09:17

## 2023-05-04 RX ADMIN — OLANZAPINE 5 MG: 5 TABLET, FILM COATED ORAL at 14:43

## 2023-05-04 RX ADMIN — HYDROXYZINE HYDROCHLORIDE 50 MG: 50 TABLET, FILM COATED ORAL at 21:11

## 2023-05-04 RX ADMIN — OLANZAPINE 5 MG: 5 TABLET, FILM COATED ORAL at 20:29

## 2023-05-04 RX ADMIN — OXCARBAZEPINE 300 MG: 150 TABLET, FILM COATED ORAL at 09:20

## 2023-05-05 VITALS
OXYGEN SATURATION: 96 % | RESPIRATION RATE: 16 BRPM | DIASTOLIC BLOOD PRESSURE: 69 MMHG | TEMPERATURE: 98.1 F | HEART RATE: 90 BPM | SYSTOLIC BLOOD PRESSURE: 112 MMHG

## 2023-05-05 LAB
ATRIAL RATE: 67 BPM
CALCULATED P AXIS, ECG09: 79 DEGREES
CALCULATED R AXIS, ECG10: 84 DEGREES
CALCULATED T AXIS, ECG11: 70 DEGREES
DIAGNOSIS, 93000: NORMAL
EST. AVERAGE GLUCOSE BLD GHB EST-MCNC: 123 MG/DL
HBA1C MFR BLD: 5.9 % (ref 4–5.6)
P-R INTERVAL, ECG05: 136 MS
Q-T INTERVAL, ECG07: 370 MS
QRS DURATION, ECG06: 98 MS
QTC CALCULATION (BEZET), ECG08: 390 MS
TSH SERPL DL<=0.05 MIU/L-ACNC: 1.49 UIU/ML (ref 0.36–3.74)
VENTRICULAR RATE, ECG03: 67 BPM

## 2023-05-05 PROCEDURE — 74011250637 HC RX REV CODE- 250/637: Performed by: FAMILY MEDICINE

## 2023-05-05 PROCEDURE — 74011250636 HC RX REV CODE- 250/636: Performed by: NURSE PRACTITIONER

## 2023-05-05 PROCEDURE — 83036 HEMOGLOBIN GLYCOSYLATED A1C: CPT

## 2023-05-05 PROCEDURE — 74011250637 HC RX REV CODE- 250/637: Performed by: NURSE PRACTITIONER

## 2023-05-05 PROCEDURE — 74011250637 HC RX REV CODE- 250/637: Performed by: PSYCHIATRY & NEUROLOGY

## 2023-05-05 PROCEDURE — 36415 COLL VENOUS BLD VENIPUNCTURE: CPT

## 2023-05-05 PROCEDURE — 65220000003 HC RM SEMIPRIVATE PSYCH

## 2023-05-05 PROCEDURE — 9990 CHARGE CONVERSION

## 2023-05-05 PROCEDURE — 84443 ASSAY THYROID STIM HORMONE: CPT

## 2023-05-05 RX ORDER — OLANZAPINE 5 MG/1
5 TABLET ORAL EVERY 6 HOURS PRN
Status: DISCONTINUED | OUTPATIENT
Start: 2023-05-06 | End: 2023-05-23 | Stop reason: HOSPADM

## 2023-05-05 RX ORDER — HALOPERIDOL 5 MG/ML
5 INJECTION INTRAMUSCULAR EVERY 6 HOURS PRN
Status: DISCONTINUED | OUTPATIENT
Start: 2023-05-06 | End: 2023-05-23 | Stop reason: HOSPADM

## 2023-05-05 RX ORDER — TRAZODONE HYDROCHLORIDE 50 MG/1
50 TABLET ORAL
Status: DISCONTINUED | OUTPATIENT
Start: 2023-05-05 | End: 2023-05-05 | Stop reason: SDUPTHER

## 2023-05-05 RX ORDER — TRAZODONE HYDROCHLORIDE 50 MG/1
50 TABLET ORAL NIGHTLY PRN
Status: DISCONTINUED | OUTPATIENT
Start: 2023-05-06 | End: 2023-05-23 | Stop reason: HOSPADM

## 2023-05-05 RX ORDER — OLANZAPINE 15 MG/1
15 TABLET ORAL
Status: ON HOLD | COMMUNITY
End: 2023-05-05

## 2023-05-05 RX ORDER — QUETIAPINE FUMARATE 100 MG/1
100 TABLET, FILM COATED ORAL
Status: DISCONTINUED | OUTPATIENT
Start: 2023-05-06 | End: 2023-05-08

## 2023-05-05 RX ORDER — NICOTINE 21 MG/24HR
1 PATCH, TRANSDERMAL 24 HOURS TRANSDERMAL DAILY
Status: DISCONTINUED | OUTPATIENT
Start: 2023-05-06 | End: 2023-05-23 | Stop reason: HOSPADM

## 2023-05-05 RX ORDER — QUETIAPINE FUMARATE 100 MG/1
100 TABLET, FILM COATED ORAL
Status: DISCONTINUED | OUTPATIENT
Start: 2023-05-05 | End: 2023-05-06 | Stop reason: HOSPADM

## 2023-05-05 RX ORDER — LEVOTHYROXINE SODIUM 0.05 MG/1
50 TABLET ORAL DAILY
Status: DISCONTINUED | OUTPATIENT
Start: 2023-05-06 | End: 2023-05-23 | Stop reason: HOSPADM

## 2023-05-05 RX ORDER — OLANZAPINE 10 MG/1
10 TABLET ORAL
Status: ON HOLD | COMMUNITY
End: 2023-05-05

## 2023-05-05 RX ORDER — LEVOTHYROXINE SODIUM 50 UG/1
50 TABLET ORAL
Status: DISCONTINUED | OUTPATIENT
Start: 2023-05-06 | End: 2023-05-06 | Stop reason: HOSPADM

## 2023-05-05 RX ORDER — HYDROXYZINE 50 MG/1
50 TABLET, FILM COATED ORAL
Status: DISCONTINUED | OUTPATIENT
Start: 2023-05-05 | End: 2023-05-06 | Stop reason: HOSPADM

## 2023-05-05 RX ORDER — LORAZEPAM 2 MG/ML
1 INJECTION INTRAMUSCULAR EVERY 4 HOURS PRN
Status: DISCONTINUED | OUTPATIENT
Start: 2023-05-06 | End: 2023-05-23 | Stop reason: HOSPADM

## 2023-05-05 RX ORDER — ACETAMINOPHEN 325 MG/1
650 TABLET ORAL EVERY 4 HOURS PRN
Status: DISCONTINUED | OUTPATIENT
Start: 2023-05-06 | End: 2023-05-23 | Stop reason: HOSPADM

## 2023-05-05 RX ORDER — LEVOTHYROXINE SODIUM 50 UG/1
TABLET ORAL
COMMUNITY

## 2023-05-05 RX ORDER — QUETIAPINE FUMARATE 300 MG/1
300 TABLET, FILM COATED ORAL
COMMUNITY

## 2023-05-05 RX ORDER — DIPHENHYDRAMINE HYDROCHLORIDE 50 MG/ML
50 INJECTION INTRAMUSCULAR; INTRAVENOUS 2 TIMES DAILY PRN
Status: DISCONTINUED | OUTPATIENT
Start: 2023-05-06 | End: 2023-05-23 | Stop reason: HOSPADM

## 2023-05-05 RX ORDER — LITHIUM CARBONATE 300 MG
150 TABLET ORAL
Status: DISCONTINUED | OUTPATIENT
Start: 2023-05-05 | End: 2023-05-06 | Stop reason: HOSPADM

## 2023-05-05 RX ORDER — QUETIAPINE FUMARATE 200 MG/1
200 TABLET, FILM COATED ORAL
Status: ON HOLD | COMMUNITY
End: 2023-05-05

## 2023-05-05 RX ORDER — IBUPROFEN 200 MG
1 TABLET ORAL EVERY 24 HOURS
COMMUNITY

## 2023-05-05 RX ORDER — BENZTROPINE MESYLATE 1 MG/1
1 TABLET ORAL 2 TIMES DAILY PRN
Status: DISCONTINUED | OUTPATIENT
Start: 2023-05-06 | End: 2023-05-23 | Stop reason: HOSPADM

## 2023-05-05 RX ORDER — QUETIAPINE FUMARATE 25 MG/1
50 TABLET, FILM COATED ORAL DAILY
Status: DISCONTINUED | OUTPATIENT
Start: 2023-05-06 | End: 2023-05-09

## 2023-05-05 RX ORDER — LITHIUM CARBONATE 300 MG
600 TABLET ORAL
COMMUNITY

## 2023-05-05 RX ORDER — LITHIUM CARBONATE 300 MG
150 TABLET ORAL
Status: DISCONTINUED | OUTPATIENT
Start: 2023-05-06 | End: 2023-05-08

## 2023-05-05 RX ORDER — POLYETHYLENE GLYCOL 3350 17 G
2 POWDER IN PACKET (EA) ORAL
Status: DISCONTINUED | OUTPATIENT
Start: 2023-05-06 | End: 2023-05-23 | Stop reason: HOSPADM

## 2023-05-05 RX ORDER — PROPRANOLOL HYDROCHLORIDE 10 MG/1
10 TABLET ORAL DAILY
COMMUNITY

## 2023-05-05 RX ORDER — HYDROXYZINE 50 MG/1
50 TABLET, FILM COATED ORAL 3 TIMES DAILY PRN
Status: DISCONTINUED | OUTPATIENT
Start: 2023-05-06 | End: 2023-05-05

## 2023-05-05 RX ORDER — HYDROXYZINE 50 MG/1
50 TABLET, FILM COATED ORAL 4 TIMES DAILY PRN
Status: DISCONTINUED | OUTPATIENT
Start: 2023-05-06 | End: 2023-05-23 | Stop reason: HOSPADM

## 2023-05-05 RX ORDER — QUETIAPINE FUMARATE 25 MG/1
50 TABLET, FILM COATED ORAL DAILY
Status: DISCONTINUED | OUTPATIENT
Start: 2023-05-05 | End: 2023-05-06 | Stop reason: HOSPADM

## 2023-05-05 RX ORDER — HYDROXYZINE 50 MG/1
50 TABLET, FILM COATED ORAL
COMMUNITY

## 2023-05-05 RX ADMIN — HYDROXYZINE HYDROCHLORIDE 50 MG: 50 TABLET, FILM COATED ORAL at 09:47

## 2023-05-05 RX ADMIN — LORAZEPAM 1 MG: 2 INJECTION INTRAMUSCULAR; INTRAVENOUS at 21:39

## 2023-05-05 RX ADMIN — LITHIUM CARBONATE 150 MG: 300 TABLET ORAL at 20:12

## 2023-05-05 RX ADMIN — OLANZAPINE 5 MG: 5 TABLET, FILM COATED ORAL at 03:49

## 2023-05-05 RX ADMIN — QUETIAPINE FUMARATE 100 MG: 100 TABLET ORAL at 20:12

## 2023-05-05 RX ADMIN — BENZTROPINE MESYLATE 1 MG: 1 TABLET ORAL at 16:32

## 2023-05-05 RX ADMIN — ACETAMINOPHEN 650 MG: 325 TABLET ORAL at 19:14

## 2023-05-05 RX ADMIN — HYDROXYZINE HYDROCHLORIDE 50 MG: 50 TABLET, FILM COATED ORAL at 17:54

## 2023-05-05 RX ADMIN — OLANZAPINE 5 MG: 5 TABLET, FILM COATED ORAL at 17:54

## 2023-05-05 RX ADMIN — QUETIAPINE FUMARATE 50 MG: 25 TABLET ORAL at 14:04

## 2023-05-05 RX ADMIN — HYDROXYZINE HYDROCHLORIDE 50 MG: 50 TABLET, FILM COATED ORAL at 03:49

## 2023-05-05 RX ADMIN — TRAZODONE HYDROCHLORIDE 50 MG: 50 TABLET ORAL at 20:12

## 2023-05-05 RX ADMIN — OLANZAPINE 5 MG: 5 TABLET, FILM COATED ORAL at 09:47

## 2023-05-05 RX ADMIN — HALOPERIDOL LACTATE 5 MG: 5 INJECTION, SOLUTION INTRAMUSCULAR at 21:39

## 2023-05-06 PROCEDURE — 6370000000 HC RX 637 (ALT 250 FOR IP): Performed by: PSYCHIATRY & NEUROLOGY

## 2023-05-06 PROCEDURE — 1240000000 HC EMOTIONAL WELLNESS R&B

## 2023-05-06 RX ADMIN — LEVOTHYROXINE SODIUM 50 MCG: 50 TABLET ORAL at 11:19

## 2023-05-06 RX ADMIN — LITHIUM CARBONATE 150 MG: 300 TABLET ORAL at 20:56

## 2023-05-06 RX ADMIN — QUETIAPINE FUMARATE 50 MG: 25 TABLET ORAL at 08:35

## 2023-05-06 RX ADMIN — QUETIAPINE FUMARATE 100 MG: 100 TABLET ORAL at 20:57

## 2023-05-06 RX ADMIN — TRAZODONE HYDROCHLORIDE 50 MG: 50 TABLET ORAL at 20:52

## 2023-05-06 RX ADMIN — NICOTINE POLACRILEX 2 MG: 2 LOZENGE ORAL at 21:00

## 2023-05-06 RX ADMIN — OLANZAPINE 5 MG: 5 TABLET, FILM COATED ORAL at 17:23

## 2023-05-06 RX ADMIN — HYDROXYZINE HYDROCHLORIDE 50 MG: 50 TABLET, FILM COATED ORAL at 17:22

## 2023-05-07 PROCEDURE — 1240000000 HC EMOTIONAL WELLNESS R&B

## 2023-05-07 PROCEDURE — 6360000002 HC RX W HCPCS: Performed by: PSYCHIATRY & NEUROLOGY

## 2023-05-07 PROCEDURE — 6370000000 HC RX 637 (ALT 250 FOR IP): Performed by: PSYCHIATRY & NEUROLOGY

## 2023-05-07 RX ADMIN — QUETIAPINE FUMARATE 50 MG: 25 TABLET ORAL at 08:49

## 2023-05-07 RX ADMIN — NICOTINE POLACRILEX 2 MG: 2 LOZENGE ORAL at 06:02

## 2023-05-07 RX ADMIN — LEVOTHYROXINE SODIUM 50 MCG: 50 TABLET ORAL at 06:02

## 2023-05-07 RX ADMIN — DIPHENHYDRAMINE HYDROCHLORIDE 50 MG: 50 INJECTION, SOLUTION INTRAMUSCULAR; INTRAVENOUS at 09:20

## 2023-05-07 RX ADMIN — NICOTINE POLACRILEX 2 MG: 2 LOZENGE ORAL at 15:43

## 2023-05-07 RX ADMIN — LITHIUM CARBONATE 150 MG: 300 TABLET ORAL at 20:22

## 2023-05-07 RX ADMIN — TRAZODONE HYDROCHLORIDE 50 MG: 50 TABLET ORAL at 20:24

## 2023-05-07 RX ADMIN — OLANZAPINE 5 MG: 5 TABLET, FILM COATED ORAL at 17:52

## 2023-05-07 RX ADMIN — HALOPERIDOL LACTATE 5 MG: 5 INJECTION, SOLUTION INTRAMUSCULAR at 09:20

## 2023-05-07 RX ADMIN — NICOTINE POLACRILEX 2 MG: 2 LOZENGE ORAL at 19:23

## 2023-05-07 RX ADMIN — BENZTROPINE MESYLATE 1 MG: 1 TABLET ORAL at 08:00

## 2023-05-07 RX ADMIN — QUETIAPINE FUMARATE 100 MG: 100 TABLET ORAL at 20:22

## 2023-05-07 RX ADMIN — HYDROXYZINE HYDROCHLORIDE 50 MG: 50 TABLET, FILM COATED ORAL at 06:03

## 2023-05-07 RX ADMIN — ACETAMINOPHEN 650 MG: 325 TABLET, FILM COATED ORAL at 10:10

## 2023-05-07 RX ADMIN — OLANZAPINE 5 MG: 5 TABLET, FILM COATED ORAL at 08:33

## 2023-05-07 RX ADMIN — LORAZEPAM 1 MG: 2 INJECTION INTRAMUSCULAR; INTRAVENOUS at 09:20

## 2023-05-07 RX ADMIN — HYDROXYZINE HYDROCHLORIDE 50 MG: 50 TABLET, FILM COATED ORAL at 17:52

## 2023-05-07 ASSESSMENT — PAIN SCALES - GENERAL
PAINLEVEL_OUTOF10: 0
PAINLEVEL_OUTOF10: 0
PAINLEVEL_OUTOF10: 5

## 2023-05-07 ASSESSMENT — PAIN DESCRIPTION - ORIENTATION: ORIENTATION: ANTERIOR

## 2023-05-07 ASSESSMENT — PAIN DESCRIPTION - LOCATION: LOCATION: HEAD

## 2023-05-07 ASSESSMENT — PAIN DESCRIPTION - DESCRIPTORS: DESCRIPTORS: ACHING

## 2023-05-08 PROCEDURE — 6370000000 HC RX 637 (ALT 250 FOR IP): Performed by: PSYCHIATRY & NEUROLOGY

## 2023-05-08 PROCEDURE — 1240000000 HC EMOTIONAL WELLNESS R&B

## 2023-05-08 PROCEDURE — 6360000002 HC RX W HCPCS: Performed by: PSYCHIATRY & NEUROLOGY

## 2023-05-08 RX ORDER — QUETIAPINE FUMARATE 100 MG/1
200 TABLET, FILM COATED ORAL
Status: DISCONTINUED | OUTPATIENT
Start: 2023-05-08 | End: 2023-05-09

## 2023-05-08 RX ORDER — LITHIUM CARBONATE 300 MG
300 TABLET ORAL
Status: DISCONTINUED | OUTPATIENT
Start: 2023-05-08 | End: 2023-05-09

## 2023-05-08 RX ADMIN — QUETIAPINE FUMARATE 200 MG: 100 TABLET ORAL at 20:07

## 2023-05-08 RX ADMIN — OLANZAPINE 5 MG: 5 TABLET, FILM COATED ORAL at 16:17

## 2023-05-08 RX ADMIN — OLANZAPINE 5 MG: 5 TABLET, FILM COATED ORAL at 06:13

## 2023-05-08 RX ADMIN — QUETIAPINE FUMARATE 50 MG: 25 TABLET ORAL at 08:40

## 2023-05-08 RX ADMIN — LEVOTHYROXINE SODIUM 50 MCG: 50 TABLET ORAL at 06:13

## 2023-05-08 RX ADMIN — LORAZEPAM 1 MG: 2 INJECTION INTRAMUSCULAR; INTRAVENOUS at 09:25

## 2023-05-08 RX ADMIN — NICOTINE POLACRILEX 2 MG: 2 LOZENGE ORAL at 17:19

## 2023-05-08 RX ADMIN — TRAZODONE HYDROCHLORIDE 50 MG: 50 TABLET ORAL at 20:07

## 2023-05-08 RX ADMIN — HALOPERIDOL LACTATE 5 MG: 5 INJECTION, SOLUTION INTRAMUSCULAR at 09:25

## 2023-05-08 RX ADMIN — ACETAMINOPHEN 650 MG: 325 TABLET, FILM COATED ORAL at 17:54

## 2023-05-08 RX ADMIN — DIPHENHYDRAMINE HYDROCHLORIDE 50 MG: 50 INJECTION, SOLUTION INTRAMUSCULAR; INTRAVENOUS at 09:25

## 2023-05-08 RX ADMIN — HYDROXYZINE HYDROCHLORIDE 50 MG: 50 TABLET, FILM COATED ORAL at 06:13

## 2023-05-08 RX ADMIN — HYDROXYZINE HYDROCHLORIDE 50 MG: 50 TABLET, FILM COATED ORAL at 16:20

## 2023-05-08 RX ADMIN — LITHIUM CARBONATE 300 MG: 300 TABLET ORAL at 20:07

## 2023-05-08 ASSESSMENT — PAIN DESCRIPTION - LOCATION: LOCATION: FOOT

## 2023-05-08 ASSESSMENT — PAIN SCALES - GENERAL: PAINLEVEL_OUTOF10: 10

## 2023-05-08 ASSESSMENT — PAIN DESCRIPTION - DESCRIPTORS: DESCRIPTORS: ACHING;SHOOTING

## 2023-05-08 ASSESSMENT — PAIN DESCRIPTION - PAIN TYPE: TYPE: ACUTE PAIN

## 2023-05-08 ASSESSMENT — PAIN DESCRIPTION - ONSET: ONSET: SUDDEN

## 2023-05-08 ASSESSMENT — PAIN - FUNCTIONAL ASSESSMENT: PAIN_FUNCTIONAL_ASSESSMENT: ACTIVITIES ARE NOT PREVENTED

## 2023-05-09 LAB
CHOLEST SERPL-MCNC: 275 MG/DL
HDLC SERPL-MCNC: 35 MG/DL
HDLC SERPL: 7.9 (ref 0–5)
LDLC SERPL CALC-MCNC: ABNORMAL MG/DL (ref 0–100)
LDLC SERPL DIRECT ASSAY-MCNC: 110 MG/DL (ref 0–100)
TRIGL SERPL-MCNC: 653 MG/DL
VLDLC SERPL CALC-MCNC: ABNORMAL MG/DL

## 2023-05-09 PROCEDURE — 1240000000 HC EMOTIONAL WELLNESS R&B

## 2023-05-09 PROCEDURE — 83721 ASSAY OF BLOOD LIPOPROTEIN: CPT

## 2023-05-09 PROCEDURE — 36415 COLL VENOUS BLD VENIPUNCTURE: CPT

## 2023-05-09 PROCEDURE — 80061 LIPID PANEL: CPT

## 2023-05-09 PROCEDURE — 6370000000 HC RX 637 (ALT 250 FOR IP): Performed by: PSYCHIATRY & NEUROLOGY

## 2023-05-09 RX ORDER — QUETIAPINE FUMARATE 100 MG/1
300 TABLET, FILM COATED ORAL
Status: DISCONTINUED | OUTPATIENT
Start: 2023-05-09 | End: 2023-05-11

## 2023-05-09 RX ORDER — LITHIUM CARBONATE 300 MG
300 TABLET ORAL 2 TIMES DAILY
Status: DISCONTINUED | OUTPATIENT
Start: 2023-05-09 | End: 2023-05-10

## 2023-05-09 RX ADMIN — LEVOTHYROXINE SODIUM 50 MCG: 50 TABLET ORAL at 06:17

## 2023-05-09 RX ADMIN — OLANZAPINE 5 MG: 5 TABLET, FILM COATED ORAL at 06:36

## 2023-05-09 RX ADMIN — OLANZAPINE 5 MG: 5 TABLET, FILM COATED ORAL at 17:29

## 2023-05-09 RX ADMIN — NICOTINE POLACRILEX 2 MG: 2 LOZENGE ORAL at 13:31

## 2023-05-09 RX ADMIN — NICOTINE POLACRILEX 2 MG: 2 LOZENGE ORAL at 19:49

## 2023-05-09 RX ADMIN — OLANZAPINE 5 MG: 5 TABLET, FILM COATED ORAL at 23:33

## 2023-05-09 RX ADMIN — HYDROXYZINE HYDROCHLORIDE 50 MG: 50 TABLET, FILM COATED ORAL at 16:09

## 2023-05-09 RX ADMIN — MAGNESIUM HYDROXIDE 30 ML: 400 SUSPENSION ORAL at 09:41

## 2023-05-09 RX ADMIN — QUETIAPINE FUMARATE 300 MG: 100 TABLET ORAL at 20:14

## 2023-05-09 RX ADMIN — QUETIAPINE FUMARATE 50 MG: 25 TABLET ORAL at 08:50

## 2023-05-09 RX ADMIN — NICOTINE POLACRILEX 2 MG: 2 LOZENGE ORAL at 09:42

## 2023-05-09 RX ADMIN — ACETAMINOPHEN 650 MG: 325 TABLET, FILM COATED ORAL at 23:33

## 2023-05-09 RX ADMIN — HYDROXYZINE HYDROCHLORIDE 50 MG: 50 TABLET, FILM COATED ORAL at 06:36

## 2023-05-09 RX ADMIN — ACETAMINOPHEN 650 MG: 325 TABLET, FILM COATED ORAL at 18:37

## 2023-05-09 RX ADMIN — ACETAMINOPHEN 650 MG: 325 TABLET, FILM COATED ORAL at 07:45

## 2023-05-09 RX ADMIN — LITHIUM CARBONATE 300 MG: 300 TABLET ORAL at 20:14

## 2023-05-09 RX ADMIN — HYDROXYZINE HYDROCHLORIDE 50 MG: 50 TABLET, FILM COATED ORAL at 23:33

## 2023-05-09 RX ADMIN — TRAZODONE HYDROCHLORIDE 50 MG: 50 TABLET ORAL at 20:14

## 2023-05-09 RX ADMIN — NICOTINE POLACRILEX 2 MG: 2 LOZENGE ORAL at 16:31

## 2023-05-09 ASSESSMENT — PAIN DESCRIPTION - PAIN TYPE: TYPE: ACUTE PAIN

## 2023-05-09 ASSESSMENT — PAIN DESCRIPTION - LOCATION
LOCATION: HEAD
LOCATION: FOOT

## 2023-05-09 ASSESSMENT — PAIN DESCRIPTION - ORIENTATION: ORIENTATION: LEFT

## 2023-05-09 ASSESSMENT — PAIN - FUNCTIONAL ASSESSMENT
PAIN_FUNCTIONAL_ASSESSMENT: ACTIVITIES ARE NOT PREVENTED
PAIN_FUNCTIONAL_ASSESSMENT: ACTIVITIES ARE NOT PREVENTED

## 2023-05-09 ASSESSMENT — PAIN SCALES - GENERAL
PAINLEVEL_OUTOF10: 10
PAINLEVEL_OUTOF10: 3
PAINLEVEL_OUTOF10: 0
PAINLEVEL_OUTOF10: 3

## 2023-05-09 ASSESSMENT — PAIN DESCRIPTION - DESCRIPTORS
DESCRIPTORS: ACHING;THROBBING
DESCRIPTORS: ACHING;SORE

## 2023-05-09 ASSESSMENT — PAIN DESCRIPTION - ONSET: ONSET: SUDDEN

## 2023-05-09 ASSESSMENT — LIFESTYLE VARIABLES: HOW OFTEN DO YOU HAVE A DRINK CONTAINING ALCOHOL: NEVER

## 2023-05-10 PROCEDURE — 6370000000 HC RX 637 (ALT 250 FOR IP): Performed by: PSYCHIATRY & NEUROLOGY

## 2023-05-10 PROCEDURE — 1240000000 HC EMOTIONAL WELLNESS R&B

## 2023-05-10 RX ORDER — LITHIUM CARBONATE 300 MG
300 TABLET ORAL ONCE
Status: COMPLETED | OUTPATIENT
Start: 2023-05-10 | End: 2023-05-10

## 2023-05-10 RX ORDER — QUETIAPINE FUMARATE 100 MG/1
100 TABLET, FILM COATED ORAL DAILY
Status: DISCONTINUED | OUTPATIENT
Start: 2023-05-10 | End: 2023-05-11

## 2023-05-10 RX ORDER — LITHIUM CARBONATE 300 MG
600 TABLET ORAL 2 TIMES DAILY
Status: DISCONTINUED | OUTPATIENT
Start: 2023-05-10 | End: 2023-05-13

## 2023-05-10 RX ADMIN — HYDROXYZINE HYDROCHLORIDE 50 MG: 50 TABLET, FILM COATED ORAL at 23:41

## 2023-05-10 RX ADMIN — QUETIAPINE FUMARATE 100 MG: 100 TABLET ORAL at 11:48

## 2023-05-10 RX ADMIN — LITHIUM CARBONATE 600 MG: 300 TABLET ORAL at 20:25

## 2023-05-10 RX ADMIN — TRAZODONE HYDROCHLORIDE 50 MG: 50 TABLET ORAL at 20:25

## 2023-05-10 RX ADMIN — LITHIUM CARBONATE 300 MG: 300 TABLET ORAL at 11:47

## 2023-05-10 RX ADMIN — OLANZAPINE 5 MG: 5 TABLET, FILM COATED ORAL at 23:41

## 2023-05-10 RX ADMIN — HYDROXYZINE HYDROCHLORIDE 50 MG: 50 TABLET, FILM COATED ORAL at 18:14

## 2023-05-10 RX ADMIN — NICOTINE POLACRILEX 2 MG: 2 LOZENGE ORAL at 16:18

## 2023-05-10 RX ADMIN — ACETAMINOPHEN 650 MG: 325 TABLET, FILM COATED ORAL at 16:18

## 2023-05-10 RX ADMIN — MAGNESIUM HYDROXIDE 30 ML: 400 SUSPENSION ORAL at 21:39

## 2023-05-10 RX ADMIN — OLANZAPINE 5 MG: 5 TABLET, FILM COATED ORAL at 05:15

## 2023-05-10 RX ADMIN — HYDROXYZINE HYDROCHLORIDE 50 MG: 50 TABLET, FILM COATED ORAL at 11:09

## 2023-05-10 RX ADMIN — OLANZAPINE 5 MG: 5 TABLET, FILM COATED ORAL at 11:09

## 2023-05-10 RX ADMIN — NICOTINE POLACRILEX 2 MG: 2 LOZENGE ORAL at 09:34

## 2023-05-10 RX ADMIN — LEVOTHYROXINE SODIUM 50 MCG: 50 TABLET ORAL at 05:15

## 2023-05-10 RX ADMIN — OLANZAPINE 5 MG: 5 TABLET, FILM COATED ORAL at 18:14

## 2023-05-10 RX ADMIN — LITHIUM CARBONATE 300 MG: 300 TABLET ORAL at 08:01

## 2023-05-10 RX ADMIN — QUETIAPINE FUMARATE 300 MG: 100 TABLET ORAL at 20:25

## 2023-05-10 RX ADMIN — ACETAMINOPHEN 650 MG: 325 TABLET, FILM COATED ORAL at 11:08

## 2023-05-10 RX ADMIN — HYDROXYZINE HYDROCHLORIDE 50 MG: 50 TABLET, FILM COATED ORAL at 05:15

## 2023-05-10 RX ADMIN — ACETAMINOPHEN 650 MG: 325 TABLET, FILM COATED ORAL at 20:07

## 2023-05-10 ASSESSMENT — PAIN - FUNCTIONAL ASSESSMENT: PAIN_FUNCTIONAL_ASSESSMENT: ACTIVITIES ARE NOT PREVENTED

## 2023-05-10 ASSESSMENT — PAIN DESCRIPTION - LOCATION
LOCATION: OTHER (COMMENT)
LOCATION: FOOT
LOCATION: OTHER (COMMENT)

## 2023-05-10 ASSESSMENT — PAIN DESCRIPTION - DESCRIPTORS
DESCRIPTORS: ACHING

## 2023-05-10 ASSESSMENT — PAIN DESCRIPTION - ORIENTATION
ORIENTATION: LEFT
ORIENTATION: LEFT
ORIENTATION: RIGHT

## 2023-05-10 ASSESSMENT — PAIN SCALES - GENERAL
PAINLEVEL_OUTOF10: 10
PAINLEVEL_OUTOF10: 3
PAINLEVEL_OUTOF10: 10

## 2023-05-11 PROCEDURE — 6370000000 HC RX 637 (ALT 250 FOR IP): Performed by: PSYCHIATRY & NEUROLOGY

## 2023-05-11 PROCEDURE — 6360000002 HC RX W HCPCS: Performed by: PSYCHIATRY & NEUROLOGY

## 2023-05-11 PROCEDURE — 1240000000 HC EMOTIONAL WELLNESS R&B

## 2023-05-11 RX ORDER — QUETIAPINE FUMARATE 100 MG/1
200 TABLET, FILM COATED ORAL DAILY
Status: DISCONTINUED | OUTPATIENT
Start: 2023-05-12 | End: 2023-05-13

## 2023-05-11 RX ORDER — QUETIAPINE FUMARATE 100 MG/1
100 TABLET, FILM COATED ORAL ONCE
Status: COMPLETED | OUTPATIENT
Start: 2023-05-11 | End: 2023-05-11

## 2023-05-11 RX ORDER — QUETIAPINE FUMARATE 100 MG/1
400 TABLET, FILM COATED ORAL
Status: DISCONTINUED | OUTPATIENT
Start: 2023-05-11 | End: 2023-05-23 | Stop reason: HOSPADM

## 2023-05-11 RX ADMIN — HALOPERIDOL LACTATE 5 MG: 5 INJECTION, SOLUTION INTRAMUSCULAR at 19:05

## 2023-05-11 RX ADMIN — QUETIAPINE FUMARATE 100 MG: 100 TABLET ORAL at 08:33

## 2023-05-11 RX ADMIN — QUETIAPINE FUMARATE 100 MG: 100 TABLET ORAL at 12:52

## 2023-05-11 RX ADMIN — BENZTROPINE MESYLATE 1 MG: 1 TABLET ORAL at 12:55

## 2023-05-11 RX ADMIN — LITHIUM CARBONATE 600 MG: 300 TABLET ORAL at 08:32

## 2023-05-11 RX ADMIN — HYDROXYZINE HYDROCHLORIDE 50 MG: 50 TABLET, FILM COATED ORAL at 15:33

## 2023-05-11 RX ADMIN — LEVOTHYROXINE SODIUM 50 MCG: 50 TABLET ORAL at 06:03

## 2023-05-11 RX ADMIN — NICOTINE POLACRILEX 2 MG: 2 LOZENGE ORAL at 18:20

## 2023-05-11 RX ADMIN — NICOTINE POLACRILEX 2 MG: 2 LOZENGE ORAL at 08:32

## 2023-05-11 RX ADMIN — ACETAMINOPHEN 650 MG: 325 TABLET, FILM COATED ORAL at 09:58

## 2023-05-11 RX ADMIN — BENZTROPINE MESYLATE 1 MG: 1 TABLET ORAL at 19:14

## 2023-05-11 RX ADMIN — NICOTINE POLACRILEX 2 MG: 2 LOZENGE ORAL at 12:37

## 2023-05-11 RX ADMIN — NICOTINE POLACRILEX 2 MG: 2 LOZENGE ORAL at 14:21

## 2023-05-11 RX ADMIN — LITHIUM CARBONATE 600 MG: 300 TABLET ORAL at 20:00

## 2023-05-11 RX ADMIN — ACETAMINOPHEN 650 MG: 325 TABLET, FILM COATED ORAL at 02:02

## 2023-05-11 RX ADMIN — DIPHENHYDRAMINE HYDROCHLORIDE 50 MG: 50 INJECTION, SOLUTION INTRAMUSCULAR; INTRAVENOUS at 02:04

## 2023-05-11 RX ADMIN — OLANZAPINE 5 MG: 5 TABLET, FILM COATED ORAL at 06:27

## 2023-05-11 RX ADMIN — HALOPERIDOL LACTATE 5 MG: 5 INJECTION, SOLUTION INTRAMUSCULAR at 02:04

## 2023-05-11 RX ADMIN — HYDROXYZINE HYDROCHLORIDE 50 MG: 50 TABLET, FILM COATED ORAL at 06:27

## 2023-05-11 RX ADMIN — ACETAMINOPHEN 650 MG: 325 TABLET, FILM COATED ORAL at 16:18

## 2023-05-11 RX ADMIN — LORAZEPAM 1 MG: 2 INJECTION INTRAMUSCULAR; INTRAVENOUS at 19:05

## 2023-05-11 RX ADMIN — OLANZAPINE 5 MG: 5 TABLET, FILM COATED ORAL at 15:33

## 2023-05-11 RX ADMIN — MAGNESIUM HYDROXIDE 30 ML: 400 SUSPENSION ORAL at 10:44

## 2023-05-11 RX ADMIN — NICOTINE POLACRILEX 2 MG: 2 LOZENGE ORAL at 16:20

## 2023-05-11 RX ADMIN — QUETIAPINE FUMARATE 400 MG: 100 TABLET ORAL at 22:13

## 2023-05-11 ASSESSMENT — PAIN SCALES - GENERAL
PAINLEVEL_OUTOF10: 0
PAINLEVEL_OUTOF10: 0
PAINLEVEL_OUTOF10: 3
PAINLEVEL_OUTOF10: 0
PAINLEVEL_OUTOF10: 10
PAINLEVEL_OUTOF10: 0

## 2023-05-11 ASSESSMENT — PAIN DESCRIPTION - ONSET: ONSET: SUDDEN

## 2023-05-11 ASSESSMENT — PAIN - FUNCTIONAL ASSESSMENT
PAIN_FUNCTIONAL_ASSESSMENT: ACTIVITIES ARE NOT PREVENTED

## 2023-05-11 ASSESSMENT — PAIN DESCRIPTION - FREQUENCY: FREQUENCY: CONTINUOUS

## 2023-05-11 ASSESSMENT — PAIN DESCRIPTION - ORIENTATION
ORIENTATION: LEFT
ORIENTATION: RIGHT

## 2023-05-11 ASSESSMENT — PAIN DESCRIPTION - DESCRIPTORS
DESCRIPTORS: SORE
DESCRIPTORS: ACHING

## 2023-05-11 ASSESSMENT — PAIN DESCRIPTION - PAIN TYPE: TYPE: ACUTE PAIN

## 2023-05-11 ASSESSMENT — PAIN DESCRIPTION - LOCATION
LOCATION: FOOT
LOCATION: FOOT

## 2023-05-12 ENCOUNTER — APPOINTMENT (OUTPATIENT)
Facility: HOSPITAL | Age: 63
DRG: 885 | End: 2023-05-12
Attending: PSYCHIATRY & NEUROLOGY
Payer: MEDICARE

## 2023-05-12 PROCEDURE — 6370000000 HC RX 637 (ALT 250 FOR IP): Performed by: PSYCHIATRY & NEUROLOGY

## 2023-05-12 PROCEDURE — 73620 X-RAY EXAM OF FOOT: CPT

## 2023-05-12 PROCEDURE — 1240000000 HC EMOTIONAL WELLNESS R&B

## 2023-05-12 RX ADMIN — OLANZAPINE 5 MG: 5 TABLET, FILM COATED ORAL at 17:15

## 2023-05-12 RX ADMIN — HYDROXYZINE HYDROCHLORIDE 50 MG: 50 TABLET, FILM COATED ORAL at 01:47

## 2023-05-12 RX ADMIN — NICOTINE POLACRILEX 2 MG: 2 LOZENGE ORAL at 11:25

## 2023-05-12 RX ADMIN — HYDROXYZINE HYDROCHLORIDE 50 MG: 50 TABLET, FILM COATED ORAL at 08:50

## 2023-05-12 RX ADMIN — NICOTINE POLACRILEX 2 MG: 2 LOZENGE ORAL at 14:44

## 2023-05-12 RX ADMIN — NICOTINE POLACRILEX 2 MG: 2 LOZENGE ORAL at 20:30

## 2023-05-12 RX ADMIN — QUETIAPINE FUMARATE 400 MG: 100 TABLET ORAL at 20:30

## 2023-05-12 RX ADMIN — LITHIUM CARBONATE 600 MG: 300 TABLET ORAL at 20:30

## 2023-05-12 RX ADMIN — MAGNESIUM HYDROXIDE 30 ML: 400 SUSPENSION ORAL at 13:34

## 2023-05-12 RX ADMIN — ACETAMINOPHEN 650 MG: 325 TABLET, FILM COATED ORAL at 08:50

## 2023-05-12 RX ADMIN — HYDROXYZINE HYDROCHLORIDE 50 MG: 50 TABLET, FILM COATED ORAL at 17:15

## 2023-05-12 RX ADMIN — LITHIUM CARBONATE 600 MG: 300 TABLET ORAL at 08:23

## 2023-05-12 RX ADMIN — NICOTINE POLACRILEX 2 MG: 2 LOZENGE ORAL at 17:48

## 2023-05-12 RX ADMIN — LEVOTHYROXINE SODIUM 50 MCG: 50 TABLET ORAL at 06:37

## 2023-05-12 RX ADMIN — QUETIAPINE FUMARATE 200 MG: 100 TABLET ORAL at 08:23

## 2023-05-12 RX ADMIN — BENZTROPINE MESYLATE 1 MG: 1 TABLET ORAL at 11:39

## 2023-05-12 RX ADMIN — ACETAMINOPHEN 650 MG: 325 TABLET, FILM COATED ORAL at 01:44

## 2023-05-12 RX ADMIN — NICOTINE POLACRILEX 2 MG: 2 LOZENGE ORAL at 13:13

## 2023-05-12 RX ADMIN — NICOTINE POLACRILEX 2 MG: 2 LOZENGE ORAL at 08:26

## 2023-05-12 RX ADMIN — TRAZODONE HYDROCHLORIDE 50 MG: 50 TABLET ORAL at 20:30

## 2023-05-12 ASSESSMENT — PAIN DESCRIPTION - ORIENTATION
ORIENTATION: LEFT;RIGHT
ORIENTATION: LEFT

## 2023-05-12 ASSESSMENT — PAIN DESCRIPTION - DESCRIPTORS
DESCRIPTORS: ACHING
DESCRIPTORS: ACHING

## 2023-05-12 ASSESSMENT — PAIN DESCRIPTION - ONSET: ONSET: ON-GOING

## 2023-05-12 ASSESSMENT — PAIN DESCRIPTION - LOCATION
LOCATION: FOOT
LOCATION: FOOT

## 2023-05-12 ASSESSMENT — PAIN DESCRIPTION - PAIN TYPE: TYPE: ACUTE PAIN

## 2023-05-12 ASSESSMENT — PAIN SCALES - GENERAL
PAINLEVEL_OUTOF10: 10
PAINLEVEL_OUTOF10: 0
PAINLEVEL_OUTOF10: 3

## 2023-05-12 ASSESSMENT — PAIN DESCRIPTION - FREQUENCY: FREQUENCY: CONTINUOUS

## 2023-05-12 ASSESSMENT — PAIN - FUNCTIONAL ASSESSMENT: PAIN_FUNCTIONAL_ASSESSMENT: ACTIVITIES ARE NOT PREVENTED

## 2023-05-13 PROCEDURE — 1240000000 HC EMOTIONAL WELLNESS R&B

## 2023-05-13 PROCEDURE — 6370000000 HC RX 637 (ALT 250 FOR IP): Performed by: PSYCHIATRY & NEUROLOGY

## 2023-05-13 RX ORDER — QUETIAPINE FUMARATE 100 MG/1
100 TABLET, FILM COATED ORAL ONCE
Status: COMPLETED | OUTPATIENT
Start: 2023-05-13 | End: 2023-05-13

## 2023-05-13 RX ORDER — LITHIUM CARBONATE 300 MG
750 TABLET ORAL 2 TIMES DAILY
Status: DISCONTINUED | OUTPATIENT
Start: 2023-05-13 | End: 2023-05-15

## 2023-05-13 RX ORDER — QUETIAPINE FUMARATE 100 MG/1
300 TABLET, FILM COATED ORAL DAILY
Status: DISCONTINUED | OUTPATIENT
Start: 2023-05-14 | End: 2023-05-14

## 2023-05-13 RX ORDER — MAGNESIUM HYDROXIDE/ALUMINUM HYDROXICE/SIMETHICONE 120; 1200; 1200 MG/30ML; MG/30ML; MG/30ML
30 SUSPENSION ORAL EVERY 6 HOURS PRN
Status: DISCONTINUED | OUTPATIENT
Start: 2023-05-13 | End: 2023-05-23 | Stop reason: HOSPADM

## 2023-05-13 RX ORDER — LITHIUM CARBONATE 300 MG
150 TABLET ORAL ONCE
Status: COMPLETED | OUTPATIENT
Start: 2023-05-13 | End: 2023-05-13

## 2023-05-13 RX ADMIN — NICOTINE POLACRILEX 2 MG: 2 LOZENGE ORAL at 15:41

## 2023-05-13 RX ADMIN — ALUMINUM HYDROXIDE, MAGNESIUM HYDROXIDE, AND SIMETHICONE 30 ML: 200; 200; 20 SUSPENSION ORAL at 09:50

## 2023-05-13 RX ADMIN — ACETAMINOPHEN 650 MG: 325 TABLET, FILM COATED ORAL at 17:41

## 2023-05-13 RX ADMIN — NICOTINE POLACRILEX 2 MG: 2 LOZENGE ORAL at 19:31

## 2023-05-13 RX ADMIN — HYDROXYZINE HYDROCHLORIDE 50 MG: 50 TABLET, FILM COATED ORAL at 06:25

## 2023-05-13 RX ADMIN — ALUMINUM HYDROXIDE, MAGNESIUM HYDROXIDE, AND SIMETHICONE 30 ML: 200; 200; 20 SUSPENSION ORAL at 18:40

## 2023-05-13 RX ADMIN — OLANZAPINE 5 MG: 5 TABLET, FILM COATED ORAL at 23:27

## 2023-05-13 RX ADMIN — QUETIAPINE FUMARATE 400 MG: 100 TABLET ORAL at 20:04

## 2023-05-13 RX ADMIN — LITHIUM CARBONATE 600 MG: 300 TABLET ORAL at 08:03

## 2023-05-13 RX ADMIN — LITHIUM CARBONATE 750 MG: 300 TABLET ORAL at 20:03

## 2023-05-13 RX ADMIN — NICOTINE POLACRILEX 2 MG: 2 LOZENGE ORAL at 09:50

## 2023-05-13 RX ADMIN — HYDROXYZINE HYDROCHLORIDE 50 MG: 50 TABLET, FILM COATED ORAL at 12:08

## 2023-05-13 RX ADMIN — OLANZAPINE 5 MG: 5 TABLET, FILM COATED ORAL at 17:41

## 2023-05-13 RX ADMIN — ACETAMINOPHEN 650 MG: 325 TABLET, FILM COATED ORAL at 12:10

## 2023-05-13 RX ADMIN — OLANZAPINE 5 MG: 5 TABLET, FILM COATED ORAL at 12:08

## 2023-05-13 RX ADMIN — NICOTINE POLACRILEX 2 MG: 2 LOZENGE ORAL at 23:27

## 2023-05-13 RX ADMIN — NICOTINE POLACRILEX 2 MG: 2 LOZENGE ORAL at 17:41

## 2023-05-13 RX ADMIN — LEVOTHYROXINE SODIUM 50 MCG: 50 TABLET ORAL at 06:25

## 2023-05-13 RX ADMIN — NICOTINE POLACRILEX 2 MG: 2 LOZENGE ORAL at 12:09

## 2023-05-13 RX ADMIN — TRAZODONE HYDROCHLORIDE 50 MG: 50 TABLET ORAL at 20:05

## 2023-05-13 RX ADMIN — LITHIUM CARBONATE 150 MG: 300 TABLET ORAL at 13:10

## 2023-05-13 RX ADMIN — QUETIAPINE FUMARATE 200 MG: 100 TABLET ORAL at 08:03

## 2023-05-13 RX ADMIN — QUETIAPINE FUMARATE 100 MG: 100 TABLET ORAL at 13:10

## 2023-05-13 RX ADMIN — HYDROXYZINE HYDROCHLORIDE 50 MG: 50 TABLET, FILM COATED ORAL at 23:27

## 2023-05-13 RX ADMIN — ACETAMINOPHEN 650 MG: 325 TABLET, FILM COATED ORAL at 08:06

## 2023-05-13 RX ADMIN — OLANZAPINE 5 MG: 5 TABLET, FILM COATED ORAL at 06:25

## 2023-05-13 RX ADMIN — ACETAMINOPHEN 650 MG: 325 TABLET, FILM COATED ORAL at 20:55

## 2023-05-13 RX ADMIN — HYDROXYZINE HYDROCHLORIDE 50 MG: 50 TABLET, FILM COATED ORAL at 17:41

## 2023-05-13 RX ADMIN — NICOTINE POLACRILEX 2 MG: 2 LOZENGE ORAL at 20:35

## 2023-05-13 ASSESSMENT — PAIN DESCRIPTION - DESCRIPTORS
DESCRIPTORS: ACHING

## 2023-05-13 ASSESSMENT — PAIN DESCRIPTION - ORIENTATION
ORIENTATION: LEFT
ORIENTATION: LOWER
ORIENTATION: LEFT
ORIENTATION: LEFT

## 2023-05-13 ASSESSMENT — PAIN SCALES - GENERAL
PAINLEVEL_OUTOF10: 7
PAINLEVEL_OUTOF10: 3
PAINLEVEL_OUTOF10: 0
PAINLEVEL_OUTOF10: 0
PAINLEVEL_OUTOF10: 10
PAINLEVEL_OUTOF10: 4

## 2023-05-13 ASSESSMENT — PAIN DESCRIPTION - LOCATION
LOCATION: BACK
LOCATION: FOOT

## 2023-05-14 PROCEDURE — 6370000000 HC RX 637 (ALT 250 FOR IP): Performed by: PSYCHIATRY & NEUROLOGY

## 2023-05-14 PROCEDURE — 93005 ELECTROCARDIOGRAM TRACING: CPT | Performed by: PSYCHIATRY & NEUROLOGY

## 2023-05-14 PROCEDURE — 1240000000 HC EMOTIONAL WELLNESS R&B

## 2023-05-14 RX ORDER — QUETIAPINE FUMARATE 100 MG/1
300 TABLET, FILM COATED ORAL
Status: DISCONTINUED | OUTPATIENT
Start: 2023-05-15 | End: 2023-05-18

## 2023-05-14 RX ORDER — FAMOTIDINE 20 MG/1
20 TABLET, FILM COATED ORAL 2 TIMES DAILY
Status: DISCONTINUED | OUTPATIENT
Start: 2023-05-14 | End: 2023-05-23 | Stop reason: HOSPADM

## 2023-05-14 RX ADMIN — NICOTINE POLACRILEX 2 MG: 2 LOZENGE ORAL at 15:41

## 2023-05-14 RX ADMIN — QUETIAPINE FUMARATE 400 MG: 100 TABLET ORAL at 20:15

## 2023-05-14 RX ADMIN — ACETAMINOPHEN 650 MG: 325 TABLET, FILM COATED ORAL at 08:41

## 2023-05-14 RX ADMIN — TRAZODONE HYDROCHLORIDE 50 MG: 50 TABLET ORAL at 20:16

## 2023-05-14 RX ADMIN — OLANZAPINE 5 MG: 5 TABLET, FILM COATED ORAL at 18:35

## 2023-05-14 RX ADMIN — ACETAMINOPHEN 650 MG: 325 TABLET, FILM COATED ORAL at 14:52

## 2023-05-14 RX ADMIN — ACETAMINOPHEN 650 MG: 325 TABLET, FILM COATED ORAL at 20:15

## 2023-05-14 RX ADMIN — LEVOTHYROXINE SODIUM 50 MCG: 50 TABLET ORAL at 07:12

## 2023-05-14 RX ADMIN — ALUMINUM HYDROXIDE, MAGNESIUM HYDROXIDE, AND SIMETHICONE 30 ML: 200; 200; 20 SUSPENSION ORAL at 09:17

## 2023-05-14 RX ADMIN — HYDROXYZINE HYDROCHLORIDE 50 MG: 50 TABLET, FILM COATED ORAL at 18:35

## 2023-05-14 RX ADMIN — NICOTINE POLACRILEX 2 MG: 2 LOZENGE ORAL at 20:48

## 2023-05-14 RX ADMIN — ALUMINUM HYDROXIDE, MAGNESIUM HYDROXIDE, AND SIMETHICONE 30 ML: 200; 200; 20 SUSPENSION ORAL at 18:28

## 2023-05-14 RX ADMIN — OLANZAPINE 5 MG: 5 TABLET, FILM COATED ORAL at 12:39

## 2023-05-14 RX ADMIN — LITHIUM CARBONATE 750 MG: 300 TABLET ORAL at 08:13

## 2023-05-14 RX ADMIN — NICOTINE POLACRILEX 2 MG: 2 LOZENGE ORAL at 12:28

## 2023-05-14 RX ADMIN — LITHIUM CARBONATE 750 MG: 300 TABLET ORAL at 20:16

## 2023-05-14 RX ADMIN — HYDROXYZINE HYDROCHLORIDE 50 MG: 50 TABLET, FILM COATED ORAL at 12:39

## 2023-05-14 RX ADMIN — NICOTINE POLACRILEX 2 MG: 2 LOZENGE ORAL at 08:41

## 2023-05-14 RX ADMIN — QUETIAPINE FUMARATE 300 MG: 100 TABLET ORAL at 08:13

## 2023-05-14 RX ADMIN — FAMOTIDINE 20 MG: 20 TABLET ORAL at 20:16

## 2023-05-14 ASSESSMENT — PAIN SCALES - GENERAL
PAINLEVEL_OUTOF10: 4
PAINLEVEL_OUTOF10: 0
PAINLEVEL_OUTOF10: 0
PAINLEVEL_OUTOF10: 4
PAINLEVEL_OUTOF10: 0
PAINLEVEL_OUTOF10: 4
PAINLEVEL_OUTOF10: 0
PAINLEVEL_OUTOF10: 0

## 2023-05-14 ASSESSMENT — PAIN DESCRIPTION - ORIENTATION
ORIENTATION: RIGHT;LEFT
ORIENTATION: LEFT
ORIENTATION: LEFT
ORIENTATION: RIGHT;LEFT

## 2023-05-14 ASSESSMENT — PAIN DESCRIPTION - LOCATION
LOCATION: FOOT;HEAD
LOCATION: FOOT

## 2023-05-14 ASSESSMENT — PAIN DESCRIPTION - DESCRIPTORS
DESCRIPTORS: ACHING

## 2023-05-15 LAB
DATE LAST DOSE: ABNORMAL
DOSE AMOUNT: ABNORMAL UNITS
DOSE DATE/TIME: ABNORMAL
EKG ATRIAL RATE: 67 BPM
EKG DIAGNOSIS: NORMAL
EKG P AXIS: 47 DEGREES
EKG P-R INTERVAL: 164 MS
EKG Q-T INTERVAL: 396 MS
EKG QRS DURATION: 102 MS
EKG QTC CALCULATION (BAZETT): 418 MS
EKG R AXIS: 80 DEGREES
EKG T AXIS: 58 DEGREES
EKG VENTRICULAR RATE: 67 BPM
LITHIUM SERPL-SCNC: 1.25 MMOL/L (ref 0.6–1.2)

## 2023-05-15 PROCEDURE — 1240000000 HC EMOTIONAL WELLNESS R&B

## 2023-05-15 PROCEDURE — 36415 COLL VENOUS BLD VENIPUNCTURE: CPT

## 2023-05-15 PROCEDURE — 80178 ASSAY OF LITHIUM: CPT

## 2023-05-15 PROCEDURE — 6370000000 HC RX 637 (ALT 250 FOR IP): Performed by: PSYCHIATRY & NEUROLOGY

## 2023-05-15 RX ORDER — LITHIUM CARBONATE 300 MG
600 TABLET ORAL 2 TIMES DAILY
Status: DISCONTINUED | OUTPATIENT
Start: 2023-05-15 | End: 2023-05-18

## 2023-05-15 RX ORDER — DIVALPROEX SODIUM 250 MG/1
250 TABLET, DELAYED RELEASE ORAL 2 TIMES DAILY
Status: DISCONTINUED | OUTPATIENT
Start: 2023-05-15 | End: 2023-05-16

## 2023-05-15 RX ADMIN — QUETIAPINE FUMARATE 400 MG: 100 TABLET ORAL at 22:15

## 2023-05-15 RX ADMIN — OLANZAPINE 5 MG: 5 TABLET, FILM COATED ORAL at 06:50

## 2023-05-15 RX ADMIN — LEVOTHYROXINE SODIUM 50 MCG: 50 TABLET ORAL at 06:39

## 2023-05-15 RX ADMIN — OLANZAPINE 5 MG: 5 TABLET, FILM COATED ORAL at 18:39

## 2023-05-15 RX ADMIN — FAMOTIDINE 20 MG: 20 TABLET ORAL at 09:08

## 2023-05-15 RX ADMIN — NICOTINE POLACRILEX 2 MG: 2 LOZENGE ORAL at 17:37

## 2023-05-15 RX ADMIN — HYDROXYZINE HYDROCHLORIDE 50 MG: 50 TABLET, FILM COATED ORAL at 06:39

## 2023-05-15 RX ADMIN — NICOTINE POLACRILEX 2 MG: 2 LOZENGE ORAL at 11:55

## 2023-05-15 RX ADMIN — NICOTINE POLACRILEX 2 MG: 2 LOZENGE ORAL at 06:50

## 2023-05-15 RX ADMIN — NICOTINE POLACRILEX 2 MG: 2 LOZENGE ORAL at 21:00

## 2023-05-15 RX ADMIN — OLANZAPINE 5 MG: 5 TABLET, FILM COATED ORAL at 09:08

## 2023-05-15 RX ADMIN — TRAZODONE HYDROCHLORIDE 50 MG: 50 TABLET ORAL at 22:16

## 2023-05-15 RX ADMIN — QUETIAPINE FUMARATE 300 MG: 100 TABLET ORAL at 11:55

## 2023-05-15 RX ADMIN — LITHIUM CARBONATE 750 MG: 300 TABLET ORAL at 09:06

## 2023-05-15 RX ADMIN — DIVALPROEX SODIUM 250 MG: 250 TABLET, DELAYED RELEASE ORAL at 22:16

## 2023-05-15 RX ADMIN — LITHIUM CARBONATE 600 MG: 300 TABLET ORAL at 22:16

## 2023-05-15 RX ADMIN — NICOTINE POLACRILEX 2 MG: 2 LOZENGE ORAL at 13:53

## 2023-05-15 RX ADMIN — DIVALPROEX SODIUM 250 MG: 250 TABLET, DELAYED RELEASE ORAL at 12:54

## 2023-05-15 RX ADMIN — HYDROXYZINE HYDROCHLORIDE 50 MG: 50 TABLET, FILM COATED ORAL at 18:39

## 2023-05-15 RX ADMIN — NICOTINE POLACRILEX 2 MG: 2 LOZENGE ORAL at 19:44

## 2023-05-15 RX ADMIN — ACETAMINOPHEN 650 MG: 325 TABLET, FILM COATED ORAL at 09:10

## 2023-05-15 RX ADMIN — FAMOTIDINE 20 MG: 20 TABLET ORAL at 22:16

## 2023-05-15 ASSESSMENT — PAIN SCALES - GENERAL
PAINLEVEL_OUTOF10: 4
PAINLEVEL_OUTOF10: 0
PAINLEVEL_OUTOF10: 0

## 2023-05-15 ASSESSMENT — PAIN DESCRIPTION - LOCATION: LOCATION: FOOT

## 2023-05-15 ASSESSMENT — PAIN DESCRIPTION - ORIENTATION: ORIENTATION: MID

## 2023-05-15 ASSESSMENT — PAIN DESCRIPTION - DESCRIPTORS: DESCRIPTORS: ACHING

## 2023-05-16 PROCEDURE — 1240000000 HC EMOTIONAL WELLNESS R&B

## 2023-05-16 PROCEDURE — 6360000002 HC RX W HCPCS: Performed by: PSYCHIATRY & NEUROLOGY

## 2023-05-16 PROCEDURE — 6370000000 HC RX 637 (ALT 250 FOR IP): Performed by: PSYCHIATRY & NEUROLOGY

## 2023-05-16 RX ORDER — DIVALPROEX SODIUM 500 MG/1
500 TABLET, DELAYED RELEASE ORAL 2 TIMES DAILY
Status: DISCONTINUED | OUTPATIENT
Start: 2023-05-16 | End: 2023-05-18

## 2023-05-16 RX ORDER — DIVALPROEX SODIUM 250 MG/1
250 TABLET, DELAYED RELEASE ORAL ONCE
Status: COMPLETED | OUTPATIENT
Start: 2023-05-16 | End: 2023-05-16

## 2023-05-16 RX ADMIN — LORAZEPAM 1 MG: 2 INJECTION INTRAMUSCULAR; INTRAVENOUS at 09:48

## 2023-05-16 RX ADMIN — DIVALPROEX SODIUM 250 MG: 250 TABLET, DELAYED RELEASE ORAL at 08:25

## 2023-05-16 RX ADMIN — LITHIUM CARBONATE 600 MG: 300 TABLET ORAL at 22:08

## 2023-05-16 RX ADMIN — HYDROXYZINE HYDROCHLORIDE 50 MG: 50 TABLET, FILM COATED ORAL at 06:12

## 2023-05-16 RX ADMIN — FAMOTIDINE 20 MG: 20 TABLET ORAL at 08:20

## 2023-05-16 RX ADMIN — OLANZAPINE 5 MG: 5 TABLET, FILM COATED ORAL at 19:36

## 2023-05-16 RX ADMIN — DIPHENHYDRAMINE HYDROCHLORIDE 50 MG: 50 INJECTION, SOLUTION INTRAMUSCULAR; INTRAVENOUS at 09:48

## 2023-05-16 RX ADMIN — ACETAMINOPHEN 650 MG: 325 TABLET, FILM COATED ORAL at 08:17

## 2023-05-16 RX ADMIN — HYDROXYZINE HYDROCHLORIDE 50 MG: 50 TABLET, FILM COATED ORAL at 19:10

## 2023-05-16 RX ADMIN — OLANZAPINE 5 MG: 5 TABLET, FILM COATED ORAL at 06:12

## 2023-05-16 RX ADMIN — DIVALPROEX SODIUM 250 MG: 250 TABLET, DELAYED RELEASE ORAL at 13:02

## 2023-05-16 RX ADMIN — QUETIAPINE FUMARATE 300 MG: 100 TABLET ORAL at 13:01

## 2023-05-16 RX ADMIN — QUETIAPINE FUMARATE 400 MG: 100 TABLET ORAL at 22:20

## 2023-05-16 RX ADMIN — NICOTINE POLACRILEX 2 MG: 2 LOZENGE ORAL at 19:10

## 2023-05-16 RX ADMIN — LEVOTHYROXINE SODIUM 50 MCG: 50 TABLET ORAL at 06:06

## 2023-05-16 RX ADMIN — ACETAMINOPHEN 650 MG: 325 TABLET, FILM COATED ORAL at 19:38

## 2023-05-16 RX ADMIN — HALOPERIDOL LACTATE 5 MG: 5 INJECTION, SOLUTION INTRAMUSCULAR at 09:48

## 2023-05-16 RX ADMIN — FAMOTIDINE 20 MG: 20 TABLET ORAL at 22:08

## 2023-05-16 RX ADMIN — LITHIUM CARBONATE 600 MG: 300 TABLET ORAL at 08:18

## 2023-05-16 RX ADMIN — DIVALPROEX SODIUM 500 MG: 500 TABLET, DELAYED RELEASE ORAL at 22:07

## 2023-05-16 ASSESSMENT — PAIN DESCRIPTION - LOCATION
LOCATION: HEAD
LOCATION: FOOT

## 2023-05-16 ASSESSMENT — PAIN DESCRIPTION - ORIENTATION: ORIENTATION: RIGHT

## 2023-05-16 ASSESSMENT — PAIN DESCRIPTION - ONSET: ONSET: SUDDEN

## 2023-05-16 ASSESSMENT — PAIN SCALES - GENERAL
PAINLEVEL_OUTOF10: 10
PAINLEVEL_OUTOF10: 4
PAINLEVEL_OUTOF10: 0

## 2023-05-16 ASSESSMENT — PAIN DESCRIPTION - DESCRIPTORS
DESCRIPTORS: ACHING
DESCRIPTORS: ACHING;SHOOTING;CRAMPING

## 2023-05-16 ASSESSMENT — PAIN - FUNCTIONAL ASSESSMENT: PAIN_FUNCTIONAL_ASSESSMENT: ACTIVITIES ARE NOT PREVENTED

## 2023-05-17 PROCEDURE — 6370000000 HC RX 637 (ALT 250 FOR IP): Performed by: PSYCHIATRY & NEUROLOGY

## 2023-05-17 PROCEDURE — 1240000000 HC EMOTIONAL WELLNESS R&B

## 2023-05-17 RX ADMIN — QUETIAPINE FUMARATE 400 MG: 100 TABLET ORAL at 21:01

## 2023-05-17 RX ADMIN — FAMOTIDINE 20 MG: 20 TABLET ORAL at 20:16

## 2023-05-17 RX ADMIN — NICOTINE POLACRILEX 2 MG: 2 LOZENGE ORAL at 10:15

## 2023-05-17 RX ADMIN — LEVOTHYROXINE SODIUM 50 MCG: 50 TABLET ORAL at 06:02

## 2023-05-17 RX ADMIN — LITHIUM CARBONATE 600 MG: 300 TABLET ORAL at 20:16

## 2023-05-17 RX ADMIN — FAMOTIDINE 20 MG: 20 TABLET ORAL at 08:40

## 2023-05-17 RX ADMIN — HYDROXYZINE HYDROCHLORIDE 50 MG: 50 TABLET, FILM COATED ORAL at 15:28

## 2023-05-17 RX ADMIN — QUETIAPINE FUMARATE 300 MG: 100 TABLET ORAL at 12:56

## 2023-05-17 RX ADMIN — NICOTINE POLACRILEX 2 MG: 2 LOZENGE ORAL at 12:56

## 2023-05-17 RX ADMIN — ACETAMINOPHEN 650 MG: 325 TABLET, FILM COATED ORAL at 18:21

## 2023-05-17 RX ADMIN — DIVALPROEX SODIUM 500 MG: 500 TABLET, DELAYED RELEASE ORAL at 08:40

## 2023-05-17 RX ADMIN — DIVALPROEX SODIUM 500 MG: 500 TABLET, DELAYED RELEASE ORAL at 20:15

## 2023-05-17 RX ADMIN — NICOTINE POLACRILEX 2 MG: 2 LOZENGE ORAL at 06:02

## 2023-05-17 RX ADMIN — NICOTINE POLACRILEX 2 MG: 2 LOZENGE ORAL at 19:39

## 2023-05-17 RX ADMIN — NICOTINE POLACRILEX 2 MG: 2 LOZENGE ORAL at 17:51

## 2023-05-17 RX ADMIN — HYDROXYZINE HYDROCHLORIDE 50 MG: 50 TABLET, FILM COATED ORAL at 20:16

## 2023-05-17 RX ADMIN — LITHIUM CARBONATE 600 MG: 300 TABLET ORAL at 08:40

## 2023-05-17 RX ADMIN — OLANZAPINE 5 MG: 5 TABLET, FILM COATED ORAL at 17:51

## 2023-05-17 RX ADMIN — NICOTINE POLACRILEX 2 MG: 2 LOZENGE ORAL at 15:28

## 2023-05-17 ASSESSMENT — PAIN SCALES - GENERAL: PAINLEVEL_OUTOF10: 0

## 2023-05-18 PROCEDURE — 6370000000 HC RX 637 (ALT 250 FOR IP): Performed by: PSYCHIATRY & NEUROLOGY

## 2023-05-18 PROCEDURE — 1240000000 HC EMOTIONAL WELLNESS R&B

## 2023-05-18 RX ORDER — LITHIUM CARBONATE 300 MG
300 TABLET ORAL 2 TIMES DAILY
Status: DISCONTINUED | OUTPATIENT
Start: 2023-05-18 | End: 2023-05-19

## 2023-05-18 RX ORDER — QUETIAPINE FUMARATE 100 MG/1
200 TABLET, FILM COATED ORAL
Status: DISCONTINUED | OUTPATIENT
Start: 2023-05-19 | End: 2023-05-19

## 2023-05-18 RX ADMIN — QUETIAPINE FUMARATE 400 MG: 100 TABLET ORAL at 20:08

## 2023-05-18 RX ADMIN — HYDROXYZINE HYDROCHLORIDE 50 MG: 50 TABLET, FILM COATED ORAL at 15:46

## 2023-05-18 RX ADMIN — NICOTINE POLACRILEX 2 MG: 2 LOZENGE ORAL at 07:06

## 2023-05-18 RX ADMIN — OLANZAPINE 5 MG: 5 TABLET, FILM COATED ORAL at 15:47

## 2023-05-18 RX ADMIN — NICOTINE POLACRILEX 2 MG: 2 LOZENGE ORAL at 11:16

## 2023-05-18 RX ADMIN — DIVALPROEX SODIUM 500 MG: 500 TABLET, DELAYED RELEASE ORAL at 09:03

## 2023-05-18 RX ADMIN — OLANZAPINE 5 MG: 5 TABLET, FILM COATED ORAL at 09:03

## 2023-05-18 RX ADMIN — HYDROXYZINE HYDROCHLORIDE 50 MG: 50 TABLET, FILM COATED ORAL at 09:04

## 2023-05-18 RX ADMIN — QUETIAPINE FUMARATE 300 MG: 100 TABLET ORAL at 11:13

## 2023-05-18 RX ADMIN — NICOTINE POLACRILEX 2 MG: 2 LOZENGE ORAL at 17:50

## 2023-05-18 RX ADMIN — LITHIUM CARBONATE 300 MG: 300 TABLET ORAL at 20:08

## 2023-05-18 RX ADMIN — LITHIUM CARBONATE 600 MG: 300 TABLET ORAL at 09:04

## 2023-05-18 RX ADMIN — LEVOTHYROXINE SODIUM 50 MCG: 50 TABLET ORAL at 07:04

## 2023-05-18 RX ADMIN — FAMOTIDINE 20 MG: 20 TABLET ORAL at 09:04

## 2023-05-18 RX ADMIN — DIVALPROEX SODIUM 750 MG: 500 TABLET, DELAYED RELEASE ORAL at 20:07

## 2023-05-18 RX ADMIN — ACETAMINOPHEN 650 MG: 325 TABLET, FILM COATED ORAL at 17:10

## 2023-05-18 RX ADMIN — NICOTINE POLACRILEX 2 MG: 2 LOZENGE ORAL at 15:46

## 2023-05-18 RX ADMIN — FAMOTIDINE 20 MG: 20 TABLET ORAL at 20:08

## 2023-05-18 ASSESSMENT — PAIN - FUNCTIONAL ASSESSMENT
PAIN_FUNCTIONAL_ASSESSMENT: 0-10
PAIN_FUNCTIONAL_ASSESSMENT: ACTIVITIES ARE NOT PREVENTED

## 2023-05-18 ASSESSMENT — PAIN DESCRIPTION - DESCRIPTORS: DESCRIPTORS: CRAMPING;ACHING;SHOOTING

## 2023-05-18 ASSESSMENT — PAIN SCALES - GENERAL
PAINLEVEL_OUTOF10: 0
PAINLEVEL_OUTOF10: 10
PAINLEVEL_OUTOF10: 0

## 2023-05-18 ASSESSMENT — PAIN DESCRIPTION - LOCATION: LOCATION: FOOT

## 2023-05-19 PROCEDURE — 6370000000 HC RX 637 (ALT 250 FOR IP): Performed by: PSYCHIATRY & NEUROLOGY

## 2023-05-19 PROCEDURE — 1240000000 HC EMOTIONAL WELLNESS R&B

## 2023-05-19 RX ORDER — LITHIUM CARBONATE 300 MG
150 TABLET ORAL 2 TIMES DAILY
Status: DISCONTINUED | OUTPATIENT
Start: 2023-05-19 | End: 2023-05-22

## 2023-05-19 RX ORDER — QUETIAPINE FUMARATE 100 MG/1
100 TABLET, FILM COATED ORAL
Status: DISCONTINUED | OUTPATIENT
Start: 2023-05-20 | End: 2023-05-22

## 2023-05-19 RX ADMIN — DIVALPROEX SODIUM 750 MG: 500 TABLET, DELAYED RELEASE ORAL at 08:47

## 2023-05-19 RX ADMIN — DIVALPROEX SODIUM 750 MG: 500 TABLET, DELAYED RELEASE ORAL at 21:16

## 2023-05-19 RX ADMIN — NICOTINE POLACRILEX 2 MG: 2 LOZENGE ORAL at 15:55

## 2023-05-19 RX ADMIN — NICOTINE POLACRILEX 2 MG: 2 LOZENGE ORAL at 18:33

## 2023-05-19 RX ADMIN — LEVOTHYROXINE SODIUM 50 MCG: 50 TABLET ORAL at 06:49

## 2023-05-19 RX ADMIN — QUETIAPINE FUMARATE 200 MG: 100 TABLET ORAL at 11:30

## 2023-05-19 RX ADMIN — QUETIAPINE FUMARATE 400 MG: 100 TABLET ORAL at 21:16

## 2023-05-19 RX ADMIN — FAMOTIDINE 20 MG: 20 TABLET ORAL at 21:16

## 2023-05-19 RX ADMIN — LITHIUM CARBONATE 150 MG: 300 TABLET ORAL at 21:16

## 2023-05-19 RX ADMIN — FAMOTIDINE 20 MG: 20 TABLET ORAL at 08:47

## 2023-05-19 RX ADMIN — LITHIUM CARBONATE 300 MG: 300 TABLET ORAL at 08:47

## 2023-05-19 RX ADMIN — NICOTINE POLACRILEX 2 MG: 2 LOZENGE ORAL at 12:48

## 2023-05-19 RX ADMIN — NICOTINE POLACRILEX 2 MG: 2 LOZENGE ORAL at 08:48

## 2023-05-19 ASSESSMENT — PAIN SCALES - GENERAL: PAINLEVEL_OUTOF10: 0

## 2023-05-20 PROCEDURE — 6370000000 HC RX 637 (ALT 250 FOR IP): Performed by: PSYCHIATRY & NEUROLOGY

## 2023-05-20 PROCEDURE — 1240000000 HC EMOTIONAL WELLNESS R&B

## 2023-05-20 RX ADMIN — LITHIUM CARBONATE 150 MG: 300 TABLET ORAL at 20:04

## 2023-05-20 RX ADMIN — NICOTINE POLACRILEX 2 MG: 2 LOZENGE ORAL at 17:34

## 2023-05-20 RX ADMIN — DIVALPROEX SODIUM 750 MG: 500 TABLET, DELAYED RELEASE ORAL at 20:05

## 2023-05-20 RX ADMIN — NICOTINE POLACRILEX 2 MG: 2 LOZENGE ORAL at 19:40

## 2023-05-20 RX ADMIN — FAMOTIDINE 20 MG: 20 TABLET ORAL at 20:06

## 2023-05-20 RX ADMIN — QUETIAPINE FUMARATE 400 MG: 100 TABLET ORAL at 20:04

## 2023-05-20 RX ADMIN — HYDROXYZINE HYDROCHLORIDE 50 MG: 50 TABLET, FILM COATED ORAL at 12:42

## 2023-05-20 RX ADMIN — LITHIUM CARBONATE 150 MG: 300 TABLET ORAL at 09:05

## 2023-05-20 RX ADMIN — NICOTINE POLACRILEX 2 MG: 2 LOZENGE ORAL at 09:08

## 2023-05-20 RX ADMIN — QUETIAPINE FUMARATE 100 MG: 100 TABLET ORAL at 11:34

## 2023-05-20 RX ADMIN — DIVALPROEX SODIUM 750 MG: 500 TABLET, DELAYED RELEASE ORAL at 09:05

## 2023-05-20 RX ADMIN — FAMOTIDINE 20 MG: 20 TABLET ORAL at 09:05

## 2023-05-20 RX ADMIN — OLANZAPINE 5 MG: 5 TABLET, FILM COATED ORAL at 12:42

## 2023-05-20 ASSESSMENT — PAIN SCALES - GENERAL: PAINLEVEL_OUTOF10: 0

## 2023-05-21 PROCEDURE — 6370000000 HC RX 637 (ALT 250 FOR IP): Performed by: PSYCHIATRY & NEUROLOGY

## 2023-05-21 PROCEDURE — 1240000000 HC EMOTIONAL WELLNESS R&B

## 2023-05-21 RX ADMIN — FAMOTIDINE 20 MG: 20 TABLET ORAL at 20:22

## 2023-05-21 RX ADMIN — HYDROXYZINE HYDROCHLORIDE 50 MG: 50 TABLET, FILM COATED ORAL at 16:29

## 2023-05-21 RX ADMIN — DIVALPROEX SODIUM 750 MG: 500 TABLET, DELAYED RELEASE ORAL at 20:21

## 2023-05-21 RX ADMIN — LEVOTHYROXINE SODIUM 50 MCG: 50 TABLET ORAL at 06:33

## 2023-05-21 RX ADMIN — NICOTINE POLACRILEX 2 MG: 2 LOZENGE ORAL at 17:51

## 2023-05-21 RX ADMIN — LITHIUM CARBONATE 150 MG: 300 TABLET ORAL at 20:21

## 2023-05-21 RX ADMIN — LITHIUM CARBONATE 150 MG: 300 TABLET ORAL at 08:00

## 2023-05-21 RX ADMIN — FAMOTIDINE 20 MG: 20 TABLET ORAL at 08:00

## 2023-05-21 RX ADMIN — DIVALPROEX SODIUM 750 MG: 500 TABLET, DELAYED RELEASE ORAL at 08:01

## 2023-05-21 RX ADMIN — NICOTINE POLACRILEX 2 MG: 2 LOZENGE ORAL at 15:51

## 2023-05-21 RX ADMIN — OLANZAPINE 5 MG: 5 TABLET, FILM COATED ORAL at 16:29

## 2023-05-21 RX ADMIN — NICOTINE POLACRILEX 2 MG: 2 LOZENGE ORAL at 08:02

## 2023-05-21 RX ADMIN — NICOTINE POLACRILEX 2 MG: 2 LOZENGE ORAL at 10:43

## 2023-05-21 RX ADMIN — NICOTINE POLACRILEX 2 MG: 2 LOZENGE ORAL at 20:39

## 2023-05-21 RX ADMIN — QUETIAPINE FUMARATE 100 MG: 100 TABLET ORAL at 11:27

## 2023-05-21 RX ADMIN — QUETIAPINE FUMARATE 400 MG: 100 TABLET ORAL at 20:22

## 2023-05-22 LAB
ALBUMIN SERPL-MCNC: 3.4 G/DL (ref 3.5–5)
ALBUMIN/GLOB SERPL: 1.1 (ref 1.1–2.2)
ALP SERPL-CCNC: 66 U/L (ref 45–117)
ALT SERPL-CCNC: 15 U/L (ref 12–78)
ANION GAP SERPL CALC-SCNC: 2 MMOL/L (ref 5–15)
AST SERPL-CCNC: 9 U/L (ref 15–37)
BILIRUB SERPL-MCNC: 0.5 MG/DL (ref 0.2–1)
BUN SERPL-MCNC: 9 MG/DL (ref 6–20)
BUN/CREAT SERPL: 9 (ref 12–20)
CALCIUM SERPL-MCNC: 9.5 MG/DL (ref 8.5–10.1)
CHLORIDE SERPL-SCNC: 109 MMOL/L (ref 97–108)
CO2 SERPL-SCNC: 27 MMOL/L (ref 21–32)
CREAT SERPL-MCNC: 1.02 MG/DL (ref 0.7–1.3)
GLOBULIN SER CALC-MCNC: 3 G/DL (ref 2–4)
GLUCOSE SERPL-MCNC: 96 MG/DL (ref 65–100)
POTASSIUM SERPL-SCNC: 4.2 MMOL/L (ref 3.5–5.1)
PROT SERPL-MCNC: 6.4 G/DL (ref 6.4–8.2)
SODIUM SERPL-SCNC: 138 MMOL/L (ref 136–145)
VALPROATE SERPL-MCNC: 103 UG/ML (ref 50–100)

## 2023-05-22 PROCEDURE — 6370000000 HC RX 637 (ALT 250 FOR IP): Performed by: PSYCHIATRY & NEUROLOGY

## 2023-05-22 PROCEDURE — 80053 COMPREHEN METABOLIC PANEL: CPT

## 2023-05-22 PROCEDURE — 1240000000 HC EMOTIONAL WELLNESS R&B

## 2023-05-22 PROCEDURE — 36415 COLL VENOUS BLD VENIPUNCTURE: CPT

## 2023-05-22 PROCEDURE — 80164 ASSAY DIPROPYLACETIC ACD TOT: CPT

## 2023-05-22 RX ADMIN — QUETIAPINE FUMARATE 400 MG: 100 TABLET ORAL at 20:01

## 2023-05-22 RX ADMIN — LITHIUM CARBONATE 150 MG: 300 TABLET ORAL at 09:25

## 2023-05-22 RX ADMIN — FAMOTIDINE 20 MG: 20 TABLET ORAL at 20:02

## 2023-05-22 RX ADMIN — DIVALPROEX SODIUM 750 MG: 500 TABLET, DELAYED RELEASE ORAL at 09:26

## 2023-05-22 RX ADMIN — OLANZAPINE 5 MG: 5 TABLET, FILM COATED ORAL at 10:32

## 2023-05-22 RX ADMIN — DIVALPROEX SODIUM 750 MG: 500 TABLET, DELAYED RELEASE ORAL at 20:02

## 2023-05-22 RX ADMIN — LEVOTHYROXINE SODIUM 50 MCG: 50 TABLET ORAL at 06:28

## 2023-05-22 RX ADMIN — HYDROXYZINE HYDROCHLORIDE 50 MG: 50 TABLET, FILM COATED ORAL at 10:32

## 2023-05-22 RX ADMIN — NICOTINE POLACRILEX 2 MG: 2 LOZENGE ORAL at 09:30

## 2023-05-22 RX ADMIN — FAMOTIDINE 20 MG: 20 TABLET ORAL at 09:27

## 2023-05-22 ASSESSMENT — PAIN SCALES - GENERAL
PAINLEVEL_OUTOF10: 0

## 2023-05-22 NOTE — INTERDISCIPLINARY ROUNDS
Behavioral Health Interdisciplinary Rounds     Patient Name: Trae Starkey  Age: 59 y.o. Room/Bed:  730/  Primary Diagnosis: <principal problem not specified>   Admission Status: Involuntary Commitment     Readmission within 30 days: no  Power of  in place: no  Patient requires a blocked bed: yes         Reason for blocked bed: behavior  Sleep hours:        Participation in Care/Groups:  infrequent  Medication Compliant?: yes  PRNS (last 24 hours): Anxiety, sleep    Restraints (last 24 hours):  no  __________________________________________________  OQ Admission Analysis Survey completed:  OQ Admission Analysis Survey score:  OQ Discharge Analysis Survey completed:  OQ Discharge Analysis Survey score:   __________________________________________________     Alcohol screening (AUDIT) completed -     If applicable, date SBIRT discussed in treatment team AND documented:    Tobacco - patient is a smoker:    Illegal Drugs use:      24 hour chart check complete: yes    _______________________________________________    Patient goal(s) for today:   Treatment team focus/goals: Plan to set up for discharge   Progress note: He has been compliant with his medications      Spiritual Care Consult:   Financial concerns/prescription coverage:  medicare   Family contact:       aunt                  Family requesting physician contact today:    Discharge plan: he will   Access to weapons :     no                                                          Outpatient provider(s):   Patient's preferred phone number for follow up call :   Patient's preferred e-mail address :    LOS:  18  Expected LOS:     Participating treatment team members:  Silvia Olsen --Dr. Chrissy Conde

## 2023-05-22 NOTE — BH NOTE
PSYCHIATRIC PROGRESS NOTE     LOS: 18 days. CHIEF COMPLAINT:  \"Doing better. \"     INTERVAL HISTORY:  05/22/2023  Mariel Hernandez feels better. He's been in much better control of his behavior. He denies any side-effects of medications. He says that his brain was somewhere else. He reports that he is now able to focus and function. He denies having any suicidal thoughts, intents or plans. No AVH.      05/21/2023  Mariel Hernandez is exhibiting improvement. He has been calm this morning; nursing reports even during periods of commotion and increased activity on the unit, Mariel Hernandez was not intrusive and stayed in his room, a notable improvement from recent behavior/judgment. He slept well last night. He was resting in bed during assessment, and exhibited a calm affect. He denied any SI/HI/AVH. He has been more receptive to redirection and appears to be responding positively to medication adjustments. Compliant with all meds and denies any adverse effects. No other changes or new concerns at this time. 05/20/2023  Seferino remains hyperthymic, but he states he slept well. He is exhibiting improvement, though remains intrusive and requires frequent redirection. He denies SI/HI/AVH. Tolerating all medications and medication adjustments without any adverse effects. No acute incidents, denies other changes or new concerns at this time. 05/19/2023  Nursing report that patient slept well overnight, has been eating his meals and taking his meds appropriately. No PRNs were given today. He has been verbally redirectable and has not been as intrusive. Upon my evaluation today patient's speech is not pressured. His restlessness is much decreased. His thought process was more linear. He complains of sedation midday with Seroquel dosing. He is interested in further decrease of Seroquel, and maximizing benefit from Depakote. I discussed with him further decrease of lithium slowly and hopefully continuing it altogether.   He was

## 2023-05-22 NOTE — PLAN OF CARE
Problem: Anxiety  Goal: Will report anxiety at manageable levels  Description: INTERVENTIONS:  1. Administer medication as ordered  2. Teach and rehearse alternative coping skills  3. Provide emotional support with 1:1 interaction with staff  5/22/2023 0831 by Tami Agudelo RN  Outcome: Progressing  5/21/2023 2327 by Preeti Quinn RN  Outcome: Progressing     Problem: Coping  Goal: Pt/Family able to verbalize concerns and demonstrate effective coping strategies  Description: INTERVENTIONS:  1. Assist patient/family to identify coping skills, available support systems and cultural and spiritual values  2. Provide emotional support, including active listening and acknowledgement of concerns of patient and caregivers  3. Reduce environmental stimuli, as able  4. Instruct patient/family in relaxation techniques, as appropriate  5. Assess for spiritual pain/suffering and initiate Spiritual Care, Psychosocial Clinical Specialist consults as needed  5/21/2023 2327 by Preeti Quinn RN  Outcome: Progressing     Problem: Decision Making  Goal: Pt/Family able to effectively weigh alternatives and participate in decision making related to treatment and care  Description: INTERVENTIONS:  1. Determine when there are differences between patient's view, family's view, and healthcare provider's view of condition  2. Facilitate patient and family articulation of goals for care  3. Help patient and family identify pros/cons of alternative solutions  4. Provide information as requested by patient/family  5. Respect patient/family right to receive or not to receive information  6. Serve as a liaison between patient and family and health care team  7. Initiate Consults from Ethics, Palliative Care or initiate [address] as is appropriate  Outcome: Progressing     Problem: Safety - Adult  Goal: Free from fall injury  Outcome: Progressing    LESS  MANIC WITH MORE ORGANIZED THOUGHTS.

## 2023-05-22 NOTE — PLAN OF CARE
Problem: Anxiety  Goal: Will report anxiety at manageable levels  5/21/2023 2327 by Eli Willis RN  Outcome: Progressing  5/21/2023 1124 by Devonte Evans RN  Outcome: Progressing  Flowsheets (Taken 5/21/2023 8078)  Will report anxiety at manageable levels:   Administer medication as ordered   Teach and rehearse alternative coping skills     Problem: Coping  Goal: Pt/Family able to verbalize concerns and demonstrate effective coping strategies  5/21/2023 2327 by Eli Willis RN  Outcome: Progressing  5/21/2023 1124 by Devonte Evans RN  Outcome: Progressing  Flowsheets (Taken 5/21/2023 3396)  Patient/family able to verbalize anxieties, fears, and concerns, and demonstrate effective coping:   Provide emotional support, including active listening and acknowledgement of concerns of patient and caregivers   Reduce environmental stimuli, as able   Assist patient/family to identify coping skills, available support systems and cultural and spiritual values   Instruct patient/family in relaxation techniques, as appropriate     Problem: Decision Making  Goal: Pt/Family able to effectively weigh alternatives and participate in decision making related to treatment and care  5/21/2023 1124 by Devonte Evans RN  Outcome: Progressing  Flowsheets (Taken 5/21/2023 8470)  Patient/family able to effectively weigh alternatives and participate in decision making related to treatment and care:   Provide information as requested by patient/family   Respect patient/family right to receive or not to receive information   Facilitate patient and family articulation of goals for care   Determine when there are differences between patient's view, family's view, and healthcare provider's view of condition  Note: Pt is alert and oriented visible on the unit. Medication and meal compliant. Goal oriented. Future focused. Improved insight and reduced intrusive behavior. Improved boundaries with education provided.

## 2023-05-22 NOTE — PROGRESS NOTES
Laboratory Monitoring for Divalproex/Valproic Acid:    The following labs have been completed for monitoring of valproic acid:    Valproic Acid Serum Concentration  Lab Results   Component Value Date/Time    [facility] 103 05/22/2023 06:27 AM       Hepatic Function  Lab Results   Component Value Date/Time    GLOB 3.0 05/22/2023 06:27 AM    ALT 15 05/22/2023 06:27 AM    AST 9 05/22/2023 06:27 AM     Hematology  Lab Results   Component Value Date/Time    WBC 8.5 05/02/2023 12:32 PM    RBC 4.48 05/02/2023 12:32 PM    HGB 12.3 05/02/2023 12:32 PM    HCT 37.9 05/02/2023 12:32 PM    MCV 84.6 05/02/2023 12:32 PM    MCH 27.5 05/02/2023 12:32 PM    MCHC 32.5 05/02/2023 12:32 PM    RDW 14.3 05/02/2023 12:32 PM     05/02/2023 12:32 PM     Assessment/Plan:  A valproic acid level was drawn on 5/22 @ 0627 and found to be 103 mcg/mL. This level is reflective of a steady state trough level and is considered therapeutic. Based on this level, recommend continuing the current dose.

## 2023-05-22 NOTE — PLAN OF CARE
Problem: Anxiety  Goal: Will report anxiety at manageable levels  Description: INTERVENTIONS:  1. Administer medication as ordered  2. Teach and rehearse alternative coping skills  3. Provide emotional support with 1:1 interaction with staff  5/22/2023 1629 by Bess Shelby LPN  Outcome: Progressing  Flowsheets (Taken 5/22/2023 1622)  Will report anxiety at manageable levels:   Administer medication as ordered   Provide emotional support with 1:1 interaction with staff   Teach and rehearse alternative coping skills    Problem: Decision Making  Goal: Pt/Family able to effectively weigh alternatives and participate in decision making related to treatment and care  Description: INTERVENTIONS:  1. Determine when there are differences between patient's view, family's view, and healthcare provider's view of condition  2. Facilitate patient and family articulation of goals for care  3. Help patient and family identify pros/cons of alternative solutions  4. Provide information as requested by patient/family  5. Respect patient/family right to receive or not to receive information  6. Serve as a liaison between patient and family and health care team  7. Initiate Consults from Ethics, Palliative Care or initiate [address] as is appropriate  5/22/2023 1629 by Bess Shelby LPN  Outcome: Progressing    [phone] - received pt awake in bed resting. Pt is encouraged to seek staff for anxiety, and encouraged to use coping skills. Pt appears to be far less manic, and has the ability to make thoughtful positive decisions. Q15 min checks in place.

## 2023-05-23 VITALS
HEART RATE: 74 BPM | WEIGHT: 160 LBS | SYSTOLIC BLOOD PRESSURE: 116 MMHG | BODY MASS INDEX: 23.7 KG/M2 | HEIGHT: 69 IN | TEMPERATURE: 98 F | DIASTOLIC BLOOD PRESSURE: 80 MMHG | RESPIRATION RATE: 18 BRPM | OXYGEN SATURATION: 97 %

## 2023-05-23 PROCEDURE — 6370000000 HC RX 637 (ALT 250 FOR IP): Performed by: PSYCHIATRY & NEUROLOGY

## 2023-05-23 RX ORDER — NICOTINE 21 MG/24HR
1 PATCH, TRANSDERMAL 24 HOURS TRANSDERMAL DAILY
Qty: 30 PATCH | Refills: 0 | Status: SHIPPED | OUTPATIENT
Start: 2023-05-24 | End: 2023-06-21

## 2023-05-23 RX ORDER — LEVOTHYROXINE SODIUM 0.05 MG/1
50 TABLET ORAL DAILY
Qty: 30 TABLET | Refills: 0 | Status: SHIPPED | OUTPATIENT
Start: 2023-05-24 | End: 2023-06-23

## 2023-05-23 RX ORDER — DIVALPROEX SODIUM 250 MG/1
750 TABLET, DELAYED RELEASE ORAL 2 TIMES DAILY
Qty: 180 TABLET | Refills: 0 | Status: SHIPPED | OUTPATIENT
Start: 2023-05-23 | End: 2023-06-22

## 2023-05-23 RX ORDER — HYDROXYZINE 50 MG/1
50 TABLET, FILM COATED ORAL DAILY PRN
Qty: 30 TABLET | Refills: 0 | Status: SHIPPED | OUTPATIENT
Start: 2023-05-23 | End: 2023-06-22

## 2023-05-23 RX ORDER — QUETIAPINE FUMARATE 400 MG/1
400 TABLET, FILM COATED ORAL
Qty: 30 TABLET | Refills: 0 | Status: SHIPPED | OUTPATIENT
Start: 2023-05-23 | End: 2023-06-22

## 2023-05-23 RX ORDER — FAMOTIDINE 20 MG/1
20 TABLET, FILM COATED ORAL 2 TIMES DAILY
Qty: 60 TABLET | Refills: 0 | Status: SHIPPED | OUTPATIENT
Start: 2023-05-23 | End: 2023-06-22

## 2023-05-23 RX ADMIN — LEVOTHYROXINE SODIUM 50 MCG: 50 TABLET ORAL at 06:27

## 2023-05-23 RX ADMIN — DIVALPROEX SODIUM 750 MG: 500 TABLET, DELAYED RELEASE ORAL at 08:41

## 2023-05-23 RX ADMIN — HYDROXYZINE HYDROCHLORIDE 50 MG: 50 TABLET, FILM COATED ORAL at 12:41

## 2023-05-23 RX ADMIN — OLANZAPINE 5 MG: 5 TABLET, FILM COATED ORAL at 12:41

## 2023-05-23 RX ADMIN — FAMOTIDINE 20 MG: 20 TABLET ORAL at 08:42

## 2023-05-23 NOTE — DISCHARGE INSTRUCTIONS
DISCHARGE SUMMARY    NAME:Janes Beaver  : 1960  MRN: [id number]    The patient Claudia Duggan exhibits the ability to control behavior in a less restrictive environment. Patient's level of functioning is improving. No assaultive/destructive behavior has been observed for the past 24 hours. No suicidal/homicidal threat or behavior has been observed for the past 24 hours. There is no evidence of serious medication side effects. Patient has not been in physical or protective restraints for at least the past 24 hours. If weapons involved, how are they secured? No weapons involved     Is patient aware of and in agreement with discharge plan? He is aware of discharge and is in agreement     Arrangements for medication:  Prescriptions filled at [facility] for 30 days     Copy of discharge instructions to provider?:  yes fax to [facility]. for transportation home:  family to pickup     Keep all follow up appointments as scheduled, continue to take prescribed medications per physician instructions. Mental health crisis number:  556 or your local mental health crisis line number at [phone] or call the  [address] [place] [address] office [address] Emergency WARM LINE      [phone] ([phone])      M-F: 9am to 9pm      Sat & Sun: [facility] suicide prevention lines:                             8-401-QSSFWVP ([phone])       [phone] ([phone])       DISCHARGE SUMMARY from Nurse    PATIENT INSTRUCTIONS:    What to do at Home:  Recommended activity: activity as tolerated,       . *  Please give a list of your current medications to your Primary Care Provider. *  Please update this list whenever your medications are discontinued, doses are      changed, or new medications (including over-the-counter products) are added. *  Please carry medication information at all times in case of emergency situations.     These are general

## 2023-05-23 NOTE — BH NOTE
GROUP THERAPY PROGRESS NOTE  No group due to low patient participation. SW provided handouts for pts to work on independently.   Digna Ruth, BRADEN, QMHP-A

## 2023-05-23 NOTE — INTERDISCIPLINARY ROUNDS
Behavioral Health Interdisciplinary Rounds     Patient Name: Ramona Cameron  Age: 59 y.o. Room/Bed:  730/  Primary Diagnosis: <principal problem not specified>   Admission Status:  Involuntary      Readmission within 30 days: no  Power of  in place: no  Patient requires a blocked bed:  yes           Reason for blocked bed: behavior  Sleep hours: 7+       Participation in Care/Groups:   yes  Medication Compliant?:  yes  PRNS (last 24 hours): anxiety       Restraints (last 24 hours):  no  __________________________________________________  OQ Admission Analysis Survey completed:  OQ Admission Analysis Survey score:  OQ Discharge Analysis Survey completed:  OQ Discharge Analysis Survey score:   __________________________________________________     Alcohol screening (AUDIT) completed -     If applicable, date SBIRT discussed in treatment team AND documented:    Tobacco - patient is a smoker:    Illegal Drugs use:      24 hour chart check complete:  yes      _______________________________________________    Patient goal(s) for today:   Treatment team focus/goals: Plan for discharge today  Progress note: He has been compliant with his medications and treatment. Denies any issues with his medications       Spiritual Care Consult:   Financial concerns/prescription coverage:  medicare   Family contact:   aunvenkatesh Doherty - [phone]                Family requesting physician contact today:    Discharge plan: he will return home   Access to weapons :    no                                                           Outpatient provider(s): [facility]   Patient's preferred phone number for follow up call :   Patient's preferred e-mail address :    LOS:  19  Expected LOS: today     Participating treatment team members:  Zehra Bernardo O'Fallon

## 2023-05-23 NOTE — PROGRESS NOTES
Pharmacist Discharge Medication Reconciliation    Discharging Provider: Dr. Juana Metcalf PMH:   Past Medical History:   Diagnosis Date    Bipolar 1 disorder ([facility]     Depression     Vanessa ([facility]     Mood disorder ([facility]     Psychotic disorder ([facility]     Substance abuse ([facility]      Chief Complaint for this Admission: No chief complaint on file. Allergies: Bee venom    Discharge Medications:      Medication List        START taking these medications      divalproex 250 MG DR tablet  Commonly known as: DEPAKOTE  Take 3 tablets by mouth in the morning and at bedtime     famotidine 20 MG tablet  Commonly known as: PEPCID  Take 1 tablet by mouth 2 times daily     hydrOXYzine HCl 50 MG tablet  Commonly known as: ATARAX  Take 1 tablet by mouth daily as needed for Anxiety     levothyroxine 50 MCG tablet  Commonly known as: SYNTHROID  Take 1 tablet by mouth Daily  Start taking on: May 24, 2023     nicotine 21 MG/24HR  Commonly known as: NICODERM CQ  Place 1 patch onto the skin daily for 28 days  Start taking on: May 24, 2023     QUEtiapine 400 MG tablet  Commonly known as: SEROQUEL  Take 1 tablet by mouth nightly            STOP taking these medications      benztropine 1 MG tablet  Commonly known as: COGENTIN     naproxen 500 MG tablet  Commonly known as: NAPROSYN     OLANZapine 5 MG tablet  Commonly known as: ZYPREXA     OXcarbazepine 300 MG tablet  Commonly known as: TRILEPTAL     traZODone 50 MG tablet  Commonly known as: DESYREL               Where to Get Your Medications        These medications were sent to [facility] [phone]  [address] [place] [address]      Phone: [phone]   divalproex 250 MG DR tablet  famotidine 20 MG tablet  hydrOXYzine HCl 50 MG tablet  levothyroxine 50 MCG tablet  nicotine 21 MG/24HR  QUEtiapine 400 MG tablet       The patient's chart, MAR and AVS were reviewed by Blanquita Gustafson RPH.

## 2023-05-23 NOTE — DISCHARGE SUMMARY
Goal directed and Illogical      ASSESSMENT AND PLAN:   Bipolar, Manic, Substance Abuse, and Substance Induced Mood Disorder, Personality Disorder NOS , Problems with primary support group, Problems related to social environment, Problems with access to  health care services, and Other psychosocial or environmental problems  and 51-60 moderate symptoms      RECOMMENDATIONS:   A candidate for inpatient psychiatry at this time under a TDO   Continue 1:1 nursing   Restart home medications until bed procured. Will to be placed on an acute unit that has a more manageable acuity level given his behaviors and risk. Thank you for this consultation      Course in the Hospital:   Patient was admitted to the inpatient psychiatry unit for acute psychiatric stabilization in regards to symptomatology as described in the HPI above and placed on Q15 minute checks and withdrawal precautions. INTERVAL HISTORY:  05/22/2023  Yogi Maki feels better. He's been in much better control of his behavior. He denies any side-effects of medications. He says that his brain was somewhere else. He reports that he is now able to focus and function. He denies having any suicidal thoughts, intents or plans. No AVH.        05/21/2023  Yogi Maki is exhibiting improvement. He has been calm this morning; nursing reports even during periods of commotion and increased activity on the unit, Yogi Maki was not intrusive and stayed in his room, a notable improvement from recent behavior/judgment. He slept well last night. He was resting in bed during assessment, and exhibited a calm affect. He denied any SI/HI/AVH. He has been more receptive to redirection and appears to be responding positively to medication adjustments. Compliant with all meds and denies any adverse effects. No other changes or new concerns at this time. 05/20/2023  Seferino remains hyperthymic, but he states he slept well.  He is exhibiting improvement, though remains intrusive and requires

## 2023-05-23 NOTE — BH NOTE
GROUP THERAPY PROGRESS NOTE    Patient is participating in self-care group. Group time: 45 minutes    Personal goal for participation: to be able to identify our positive traits. Goal orientation: Personal    Group therapy participation: passive/minimal     Therapeutic interventions reviewed and discussed: Members were able to gain perspective about personal traits. Members discussed how positive self-awareness and talk can change the way we think to more positive thought patterns. Handouts provided     Impression of participation: SW provided worksheets to be completed independently. SW will meet with group to discuss findings.        Bart FELICIANO, HP-A

## 2023-05-23 NOTE — PLAN OF CARE
DISCHARGE SUMMARY from Nurse    PATIENT INSTRUCTIONS:    What to do at Home:  Recommended activity: activity as tolerated,     I*  Please give a list of your current medications to your Primary Care Provider. *  Please update this list whenever your medications are discontinued, doses are      changed, or new medications (including over-the-counter products) are added. *  Please carry medication information at all times in case of emergency situations. These are general instructions for a healthy lifestyle:    No smoking/ No tobacco products/ Avoid exposure to second hand smoke  Surgeon General's Warning:  Quitting smoking now greatly reduces serious risk to your health. Obesity, smoking, and sedentary lifestyle greatly increases your risk for illness    A healthy diet, regular physical exercise & weight monitoring are important for maintaining a healthy lifestyle    You may be retaining fluid if you have a history of heart failure or if you experience any of the following symptoms:  Weight gain of 3 pounds or more overnight or 5 pounds in a week, increased swelling in our hands or feet or shortness of breath while lying flat in bed. Please call your doctor as soon as you notice any of these symptoms; do not wait until your next office visit. The discharge information has been reviewed with the patient. The patient verbalized understanding. Discharge medications reviewed with the patient and appropriate educational materials and side effects teaching were provided. Prescriptions filled for patient and will be given to him upon discharge. Transportation to be provided by [facility].  ___________________________________________________________________________________________________________________________________  Problem: Discharge Planning  Goal: Discharge to home or other facility with appropriate resources  Outcome: Progressing   Plan is to return home to live with Aunt who will be providing

## 2023-05-23 NOTE — PLAN OF CARE
Problem: Safety - Adult  Goal: Free from fall injury  Outcome: Progressing     Patient received resting quietly in bed. No signs of distress. Even and unlabored breathing. Staff will continue to monitor safety q15 and provide support.

## 2023-05-24 NOTE — BH NOTE
Behavioral Health Transition Record to Provider    Patient Name: Terri Miller  YOB: 1960  Medical Record Number: [id number]  Date of Admission: 5/4/2023  Date of Discharge: 5/23/23    Attending Provider: No att. providers found  Discharging Provider: Dr. Kizzy Bhat   To contact this individual call [phone] and ask the  to page. If unavailable, ask to be transferred to [facility] Provider on call. [facility] Provider will be available on call 24/7 and during holidays. Primary Care Provider: None None    Allergies   Allergen Reactions    Bee Venom Swelling       Reason for Admission:  CHIEF COMPLAINT:   \"Folks misunderstand my antics . \"      HISTORY OF PRESENTING COMPLAINT:   Terri Miller is a 59 y.o. WHITE/NON- male who is currently admitted to the acute side of 7th floor behavioral health Unit at [facility].    Patient goes by [employer]. He was involuntarily committed by the court today. Initially he refused to participate in his treatment team this morning. Upon approaching him a second time, he wanted to come to the treatment team.   [place] was brought to hospital on TDO by police after he was observed in the middle of the road and motorists were afraid that he was attempting to end his life. He says that he just threw a stick up in the air and that concerned everyone. He hasn't been  taking his meds now for 5 days. Apparently he came to [facility] to look for his brother who struggles with alcohol use, but ended up missing his medications, rooming with friends. Patient denies experiencing AH. No paranoia. No delusions.        Admission Diagnosis: Delusional disorder ([facility] [F22]    * No surgery found *    Results for orders placed or performed during the hospital encounter of 05/04/23   Lipid Panel   Result Value Ref Range    Cholesterol, Total 275 (H) <200 MG/DL    Triglycerides 653 (H) <150 MG/DL    HDL 35 MG/DL    LDL Calculated Not

## 2023-10-13 ENCOUNTER — HOSPITAL ENCOUNTER (INPATIENT)
Facility: HOSPITAL | Age: 63
LOS: 5 days | Discharge: HOME | DRG: 885 | End: 2023-10-18
Attending: EMERGENCY MEDICINE | Admitting: PSYCHIATRY & NEUROLOGY
Payer: MEDICAID

## 2023-10-13 DIAGNOSIS — F31.9 BIPOLAR 1 DISORDER (HCC): Primary | ICD-10-CM

## 2023-10-13 DIAGNOSIS — Z65.9 MEDICATION NONADHERENCE DUE TO PSYCHOSOCIAL PROBLEM: ICD-10-CM

## 2023-10-13 DIAGNOSIS — Z91.148 MEDICATION NONADHERENCE DUE TO PSYCHOSOCIAL PROBLEM: ICD-10-CM

## 2023-10-13 LAB
ALBUMIN SERPL-MCNC: 3.4 G/DL (ref 3.5–5)
ALBUMIN/GLOB SERPL: 1.2 (ref 1.1–2.2)
ALP SERPL-CCNC: 75 U/L (ref 45–117)
ALT SERPL-CCNC: 50 U/L (ref 12–78)
AMPHET UR QL SCN: NEGATIVE
ANION GAP SERPL CALC-SCNC: 9 MMOL/L (ref 5–15)
APPEARANCE UR: CLEAR
AST SERPL-CCNC: 113 U/L (ref 15–37)
BACTERIA URNS QL MICRO: NEGATIVE /HPF
BARBITURATES UR QL SCN: NEGATIVE
BASOPHILS # BLD: 0 K/UL (ref 0–0.1)
BASOPHILS NFR BLD: 0 % (ref 0–1)
BENZODIAZ UR QL: NEGATIVE
BILIRUB SERPL-MCNC: 0.8 MG/DL (ref 0.2–1)
BILIRUB UR QL: NEGATIVE
BUN SERPL-MCNC: 12 MG/DL (ref 6–20)
BUN/CREAT SERPL: 14 (ref 12–20)
CALCIUM SERPL-MCNC: 8.4 MG/DL (ref 8.5–10.1)
CANNABINOIDS UR QL SCN: POSITIVE
CHLORIDE SERPL-SCNC: 103 MMOL/L (ref 97–108)
CO2 SERPL-SCNC: 25 MMOL/L (ref 21–32)
COCAINE UR QL SCN: NEGATIVE
COLOR UR: ABNORMAL
CREAT SERPL-MCNC: 0.85 MG/DL (ref 0.7–1.3)
DATE LAST DOSE: ABNORMAL
DIFFERENTIAL METHOD BLD: ABNORMAL
DOSE AMOUNT: ABNORMAL UNITS
DOSE DATE/TIME: ABNORMAL
EOSINOPHIL # BLD: 0.2 K/UL (ref 0–0.4)
EOSINOPHIL NFR BLD: 3 % (ref 0–7)
EPITH CASTS URNS QL MICRO: ABNORMAL /LPF
ERYTHROCYTE [DISTWIDTH] IN BLOOD BY AUTOMATED COUNT: 12.8 % (ref 11.5–14.5)
ETHANOL SERPL-MCNC: <10 MG/DL (ref 0–0.08)
FLUAV RNA SPEC QL NAA+PROBE: NOT DETECTED
FLUBV RNA SPEC QL NAA+PROBE: NOT DETECTED
GLOBULIN SER CALC-MCNC: 2.9 G/DL (ref 2–4)
GLUCOSE SERPL-MCNC: 118 MG/DL (ref 65–100)
GLUCOSE UR STRIP.AUTO-MCNC: NEGATIVE MG/DL
HCT VFR BLD AUTO: 33.9 % (ref 36.6–50.3)
HGB BLD-MCNC: 11 G/DL (ref 12.1–17)
HGB UR QL STRIP: NEGATIVE
IMM GRANULOCYTES # BLD AUTO: 0 K/UL (ref 0–0.04)
IMM GRANULOCYTES NFR BLD AUTO: 0 % (ref 0–0.5)
KETONES UR QL STRIP.AUTO: 15 MG/DL
LEUKOCYTE ESTERASE UR QL STRIP.AUTO: ABNORMAL
LITHIUM SERPL-SCNC: <0.2 MMOL/L (ref 0.6–1.2)
LYMPHOCYTES # BLD: 1.2 K/UL (ref 0.8–3.5)
LYMPHOCYTES NFR BLD: 15 % (ref 12–49)
Lab: ABNORMAL
MCH RBC QN AUTO: 28.4 PG (ref 26–34)
MCHC RBC AUTO-ENTMCNC: 32.4 G/DL (ref 30–36.5)
MCV RBC AUTO: 87.4 FL (ref 80–99)
METHADONE UR QL: NEGATIVE
MONOCYTES # BLD: 0.7 K/UL (ref 0–1)
MONOCYTES NFR BLD: 8 % (ref 5–13)
NEUTS SEG # BLD: 6.3 K/UL (ref 1.8–8)
NEUTS SEG NFR BLD: 74 % (ref 32–75)
NITRITE UR QL STRIP.AUTO: NEGATIVE
NRBC # BLD: 0 K/UL (ref 0–0.01)
NRBC BLD-RTO: 0 PER 100 WBC
OPIATES UR QL: NEGATIVE
PCP UR QL: NEGATIVE
PH UR STRIP: 5.5 (ref 5–8)
PLATELET # BLD AUTO: 350 K/UL (ref 150–400)
PMV BLD AUTO: 8.7 FL (ref 8.9–12.9)
POTASSIUM SERPL-SCNC: 3.4 MMOL/L (ref 3.5–5.1)
PROT SERPL-MCNC: 6.3 G/DL (ref 6.4–8.2)
PROT UR STRIP-MCNC: NEGATIVE MG/DL
RBC # BLD AUTO: 3.88 M/UL (ref 4.1–5.7)
RBC #/AREA URNS HPF: ABNORMAL /HPF (ref 0–5)
SARS-COV-2 RNA RESP QL NAA+PROBE: NOT DETECTED
SODIUM SERPL-SCNC: 137 MMOL/L (ref 136–145)
SP GR UR REFRACTOMETRY: 1.01
URINE CULTURE IF INDICATED: ABNORMAL
UROBILINOGEN UR QL STRIP.AUTO: 1 EU/DL (ref 0.2–1)
WBC # BLD AUTO: 8.5 K/UL (ref 4.1–11.1)
WBC URNS QL MICRO: ABNORMAL /HPF (ref 0–4)

## 2023-10-13 PROCEDURE — 80307 DRUG TEST PRSMV CHEM ANLYZR: CPT

## 2023-10-13 PROCEDURE — 80053 COMPREHEN METABOLIC PANEL: CPT

## 2023-10-13 PROCEDURE — 85025 COMPLETE CBC W/AUTO DIFF WBC: CPT

## 2023-10-13 PROCEDURE — 90791 PSYCH DIAGNOSTIC EVALUATION: CPT

## 2023-10-13 PROCEDURE — 99285 EMERGENCY DEPT VISIT HI MDM: CPT

## 2023-10-13 PROCEDURE — 87636 SARSCOV2 & INF A&B AMP PRB: CPT

## 2023-10-13 PROCEDURE — 82077 ASSAY SPEC XCP UR&BREATH IA: CPT

## 2023-10-13 PROCEDURE — 36415 COLL VENOUS BLD VENIPUNCTURE: CPT

## 2023-10-13 PROCEDURE — 1100000000 HC RM PRIVATE

## 2023-10-13 PROCEDURE — 80178 ASSAY OF LITHIUM: CPT

## 2023-10-13 PROCEDURE — 81001 URINALYSIS AUTO W/SCOPE: CPT

## 2023-10-13 PROCEDURE — 6370000000 HC RX 637 (ALT 250 FOR IP): Performed by: EMERGENCY MEDICINE

## 2023-10-13 RX ORDER — QUETIAPINE FUMARATE 25 MG/1
50 TABLET, FILM COATED ORAL
Status: COMPLETED | OUTPATIENT
Start: 2023-10-13 | End: 2023-10-13

## 2023-10-13 RX ORDER — LITHIUM CARBONATE 300 MG/1
2 TABLET, FILM COATED, EXTENDED RELEASE ORAL NIGHTLY
COMMUNITY
Start: 2023-09-30

## 2023-10-13 RX ORDER — DM/PE/ACETAMINOPHEN/CHLORPHENR 10-5-325-2
2 TABLET, SEQUENTIAL ORAL DAILY
COMMUNITY
Start: 2023-09-30

## 2023-10-13 RX ORDER — CHOLECALCIFEROL (VITAMIN D3) 125 MCG
1 CAPSULE ORAL DAILY
COMMUNITY
Start: 2023-09-30

## 2023-10-13 RX ORDER — LITHIUM CARBONATE 300 MG/1
600 TABLET, FILM COATED, EXTENDED RELEASE ORAL
Status: DISCONTINUED | OUTPATIENT
Start: 2023-10-13 | End: 2023-10-18 | Stop reason: HOSPADM

## 2023-10-13 RX ORDER — LEVOTHYROXINE SODIUM 0.07 MG/1
1 TABLET ORAL DAILY
COMMUNITY
Start: 2023-09-30

## 2023-10-13 RX ORDER — NAPROXEN 500 MG/1
1 TABLET ORAL DAILY PRN
COMMUNITY
Start: 2023-10-13

## 2023-10-13 RX ORDER — LITHIUM CARBONATE 300 MG
600 TABLET ORAL
Status: DISCONTINUED | OUTPATIENT
Start: 2023-10-13 | End: 2023-10-13

## 2023-10-13 RX ADMIN — LITHIUM CARBONATE 600 MG: 300 TABLET, FILM COATED, EXTENDED RELEASE ORAL at 21:16

## 2023-10-13 RX ADMIN — QUETIAPINE FUMARATE 50 MG: 25 TABLET ORAL at 21:16

## 2023-10-13 ASSESSMENT — PAIN - FUNCTIONAL ASSESSMENT: PAIN_FUNCTIONAL_ASSESSMENT: NONE - DENIES PAIN

## 2023-10-13 NOTE — ED PROVIDER NOTES
[facility] EMERGENCY DEPT  EMERGENCY DEPARTMENT ENCOUNTER       Pt Name: Dedra Daly  MRN: [id number]  [address] 1960  Date of evaluation: 10/13/2023  Provider: Eddie Gross MD   PCP: None, None  Note Started: 7:55 PM EDT 10/13/23     CHIEF COMPLAINT       Chief Complaint   Patient presents with    Withdrawal    Mental Health Problem        HISTORY OF PRESENT ILLNESS: 1 or more elements      History From: patient, History limited by: none     Dedra Daly is a 59 y.o. male with history of bipolar disorder presents emergency department with a chief complaint of \"I am out of my medicines. \"    Patient reports he was previousl admitted to [facility], reports that he was prescribed lithium and Seroquel. He reports he \"left home\" in [place] and was in [facility] and then he is \"homeless in [place]. \"  He reports he has not had his medicines in 5 days. Has been sleeping on \"concrete and steel. \"    He denies any SI/HI. Please See MDM for Additional Details of the HPI/PMH  Nursing Notes were all reviewed and agreed with or any disagreements were addressed in the HPI. REVIEW OF SYSTEMS        Positives and Pertinent negatives as per HPI. PAST HISTORY     Past Medical History:  Past Medical History:   Diagnosis Date    Bipolar 1 disorder ([facility])     Depression     Vanessa ([facility])     Mood disorder ([facility])     Psychotic disorder ([facility])     Substance abuse ([facility])        Past Surgical History:  Past Surgical History:   Procedure Laterality Date    VA UNLISTED PROCEDURE ABDOMEN PERITONEUM & OMENTUM      hernia       Family History:  No family history on file. Social History:  Social History     Tobacco Use    Smoking status: Every Day     Packs/day: .5     Types: Cigarettes    Smokeless tobacco: Never   Substance Use Topics    Alcohol use: No    Drug use: Not Currently     Types: Marijuana Viola Liza), Cocaine, Prescription       Allergies:   Allergies   Allergen Reactions    Bee Venom Swelling       CURRENT MEDICATIONS

## 2023-10-14 PROCEDURE — 6370000000 HC RX 637 (ALT 250 FOR IP): Performed by: NURSE PRACTITIONER

## 2023-10-14 PROCEDURE — 6370000000 HC RX 637 (ALT 250 FOR IP): Performed by: EMERGENCY MEDICINE

## 2023-10-14 PROCEDURE — 1240000000 HC EMOTIONAL WELLNESS R&B

## 2023-10-14 RX ORDER — NICOTINE 21 MG/24HR
1 PATCH, TRANSDERMAL 24 HOURS TRANSDERMAL DAILY
Status: DISCONTINUED | OUTPATIENT
Start: 2023-10-14 | End: 2023-10-18 | Stop reason: HOSPADM

## 2023-10-14 RX ORDER — DIPHENHYDRAMINE HYDROCHLORIDE 50 MG/ML
50 INJECTION INTRAMUSCULAR; INTRAVENOUS EVERY 4 HOURS PRN
Status: DISCONTINUED | OUTPATIENT
Start: 2023-10-14 | End: 2023-10-18 | Stop reason: HOSPADM

## 2023-10-14 RX ORDER — FAMOTIDINE 20 MG/1
20 TABLET, FILM COATED ORAL 2 TIMES DAILY
COMMUNITY

## 2023-10-14 RX ORDER — MAGNESIUM HYDROXIDE/ALUMINUM HYDROXICE/SIMETHICONE 120; 1200; 1200 MG/30ML; MG/30ML; MG/30ML
30 SUSPENSION ORAL EVERY 6 HOURS PRN
Status: DISCONTINUED | OUTPATIENT
Start: 2023-10-14 | End: 2023-10-18 | Stop reason: HOSPADM

## 2023-10-14 RX ORDER — HALOPERIDOL 5 MG/1
5 TABLET ORAL EVERY 4 HOURS PRN
Status: DISCONTINUED | OUTPATIENT
Start: 2023-10-14 | End: 2023-10-18 | Stop reason: HOSPADM

## 2023-10-14 RX ORDER — TRAZODONE HYDROCHLORIDE 50 MG/1
50 TABLET ORAL NIGHTLY PRN
Status: DISCONTINUED | OUTPATIENT
Start: 2023-10-14 | End: 2023-10-18 | Stop reason: HOSPADM

## 2023-10-14 RX ORDER — HYDROXYZINE HYDROCHLORIDE 25 MG/1
50 TABLET, FILM COATED ORAL 3 TIMES DAILY PRN
Status: DISCONTINUED | OUTPATIENT
Start: 2023-10-14 | End: 2023-10-18 | Stop reason: HOSPADM

## 2023-10-14 RX ORDER — QUETIAPINE FUMARATE 300 MG/1
300 TABLET, FILM COATED ORAL
COMMUNITY

## 2023-10-14 RX ORDER — ACETAMINOPHEN 325 MG/1
650 TABLET ORAL EVERY 4 HOURS PRN
Status: DISCONTINUED | OUTPATIENT
Start: 2023-10-14 | End: 2023-10-18 | Stop reason: HOSPADM

## 2023-10-14 RX ORDER — HALOPERIDOL 5 MG/ML
5 INJECTION INTRAMUSCULAR EVERY 4 HOURS PRN
Status: DISCONTINUED | OUTPATIENT
Start: 2023-10-14 | End: 2023-10-18 | Stop reason: HOSPADM

## 2023-10-14 RX ORDER — POLYETHYLENE GLYCOL 3350 17 G/17G
17 POWDER, FOR SOLUTION ORAL DAILY PRN
Status: DISCONTINUED | OUTPATIENT
Start: 2023-10-14 | End: 2023-10-18 | Stop reason: HOSPADM

## 2023-10-14 RX ADMIN — LITHIUM CARBONATE 600 MG: 300 TABLET, FILM COATED, EXTENDED RELEASE ORAL at 20:34

## 2023-10-14 RX ADMIN — HALOPERIDOL 5 MG: 5 TABLET ORAL at 20:34

## 2023-10-14 RX ADMIN — ACETAMINOPHEN 650 MG: 325 TABLET ORAL at 13:58

## 2023-10-14 RX ADMIN — HALOPERIDOL 5 MG: 5 TABLET ORAL at 03:00

## 2023-10-14 RX ADMIN — TRAZODONE HYDROCHLORIDE 50 MG: 50 TABLET ORAL at 20:34

## 2023-10-14 RX ADMIN — ACETAMINOPHEN 650 MG: 325 TABLET ORAL at 03:00

## 2023-10-14 RX ADMIN — HYDROXYZINE HYDROCHLORIDE 50 MG: 25 TABLET ORAL at 13:39

## 2023-10-14 RX ADMIN — HYDROXYZINE HYDROCHLORIDE 50 MG: 25 TABLET ORAL at 03:00

## 2023-10-14 RX ADMIN — HALOPERIDOL 5 MG: 5 TABLET ORAL at 13:39

## 2023-10-14 RX ADMIN — HYDROXYZINE HYDROCHLORIDE 50 MG: 25 TABLET ORAL at 20:34

## 2023-10-14 ASSESSMENT — LIFESTYLE VARIABLES
HOW MANY STANDARD DRINKS CONTAINING ALCOHOL DO YOU HAVE ON A TYPICAL DAY: PATIENT DOES NOT DRINK
HOW OFTEN DO YOU HAVE A DRINK CONTAINING ALCOHOL: NEVER

## 2023-10-14 ASSESSMENT — SLEEP AND FATIGUE QUESTIONNAIRES
AVERAGE NUMBER OF SLEEP HOURS: 4
DO YOU HAVE DIFFICULTY SLEEPING: YES
SLEEP PATTERN: DISTURBED/INTERRUPTED SLEEP;INSOMNIA
DO YOU USE A SLEEP AID: YES

## 2023-10-14 ASSESSMENT — PAIN SCALES - GENERAL
PAINLEVEL_OUTOF10: 7
PAINLEVEL_OUTOF10: 3
PAINLEVEL_OUTOF10: 0
PAINLEVEL_OUTOF10: 3

## 2023-10-14 ASSESSMENT — PAIN DESCRIPTION - LOCATION
LOCATION: GENERALIZED
LOCATION: GENERALIZED

## 2023-10-14 ASSESSMENT — PAIN - FUNCTIONAL ASSESSMENT: PAIN_FUNCTIONAL_ASSESSMENT: ACTIVITIES ARE NOT PREVENTED

## 2023-10-14 ASSESSMENT — PAIN DESCRIPTION - DESCRIPTORS
DESCRIPTORS: ACHING
DESCRIPTORS: ACHING

## 2023-10-14 NOTE — BSMART NOTE
BSMART assessment completed, and suicide risk level noted to be no risk. Primary Nurse Marvin Gonzalez and Physician Dr. Radha Perez notified. Concerns not observed.

## 2023-10-14 NOTE — ED NOTES
Report called to zechariah Lima at this time. Room needs to be cleaned prior to admission. Reports will call when room is ready. Charge Rn made aware      Wilberto Medina RN  10/13/23 1270       Wilberto Medina RN  10/13/23 5861    TRANSFER - OUT REPORT:    Verbal report given to zechariah on [facility]  being transferred to [facility]  for routine progression of patient care       Report consisted of patient's Situation, Background, Assessment and   Recommendations(SBAR). Information from the following report(s) ED Encounter Summary, ED SBAR, [facility], and Recent Results was reviewed with the receiving nurse. Sadiq Fall Assessment:    Presents to emergency department  because of falls (Syncope, seizure, or loss of consciousness): No  Age > 70: No  Altered Mental Status, Intoxication with alcohol or substance confusion (Disorientation, impaired judgment, poor safety awaremess, or inability to follow instructions): No  Impaired Mobility: Ambulates or transfers with assistive devices or assistance; Unable to ambulate or transer.: No  Nursing Judgement: No          Lines:       Opportunity for questions and clarification was provided. Patient transported with:  Security.            Wilberto Medina RN  10/13/23 6645

## 2023-10-14 NOTE — BSMART NOTE
Comprehensive Assessment Form Part 1    Section I - Disposition    Primary Diagnosis: Bipolar Disorder    The Medical Doctor to Psychiatrist conference was not completed. The Medical Doctor is in agreement with Psychiatrist disposition because of (reason) patient is requesting voluntary admission. The plan is admit to inpatient psych once medically cleared. The on-call Psychiatrist consulted was Dr. Jeramy Rider. The admitting Psychiatrist will be Dr. Jeramy Rider. The admitting Diagnosis is Bipolar Disorder. The Payor source is Medicare and [facility] Medicaid. This writer reviewed the [address] [place] [address] in nursing flowsheet and the risk level assigned is no risk. Based on this assessment, the risk of suicide is none and the plan is admit to inpatient psych due to juan carlos. Section II - Integrated Summary  Summary:  Patient is a 59year old male seen face to face in the ER. He came to the ER reporting that he has not had his needed Lithium or Seroquel for 5 days. He was observed in the waiting room before he was roomed walking around, going up to every person around him asking if they were a doctor, and then stating that he was having a \"nervous break down and I need my Lithium and Seroquel. \"  Patient has a lengthy mental health treatment history. Patient's medical record shows his most recent admissions being at [facility] via TDO in September 2023, at [facility] in June/July 2023 via TDO, and at [facility] as a TDO in May 2023. He appears to be manic and stated he is not sleeping. He denied suicidal and homicidal ideation. He is homeless and does not report any outpatient mental health providers. He is requesting voluntary admission. The patient has demonstrated mental capacity to provide informed consent. The information is given by the patient and past medical records. The Chief Complaint is withdrawal.  The Precipitant Factors are homeless, treatment noncompliance.   Previous Hospitalizations:

## 2023-10-15 PROCEDURE — 6370000000 HC RX 637 (ALT 250 FOR IP): Performed by: NURSE PRACTITIONER

## 2023-10-15 PROCEDURE — 1240000000 HC EMOTIONAL WELLNESS R&B

## 2023-10-15 PROCEDURE — 6370000000 HC RX 637 (ALT 250 FOR IP): Performed by: EMERGENCY MEDICINE

## 2023-10-15 RX ORDER — QUETIAPINE FUMARATE 25 MG/1
50 TABLET, FILM COATED ORAL 2 TIMES DAILY
Status: DISCONTINUED | OUTPATIENT
Start: 2023-10-15 | End: 2023-10-15

## 2023-10-15 RX ORDER — FAMOTIDINE 20 MG/1
20 TABLET, FILM COATED ORAL 2 TIMES DAILY
Status: DISCONTINUED | OUTPATIENT
Start: 2023-10-15 | End: 2023-10-18 | Stop reason: HOSPADM

## 2023-10-15 RX ORDER — QUETIAPINE FUMARATE 25 MG/1
50 TABLET, FILM COATED ORAL EVERY 6 HOURS PRN
Status: DISCONTINUED | OUTPATIENT
Start: 2023-10-15 | End: 2023-10-18 | Stop reason: HOSPADM

## 2023-10-15 RX ORDER — VITAMIN B COMPLEX
2000 TABLET ORAL DAILY
Status: DISCONTINUED | OUTPATIENT
Start: 2023-10-15 | End: 2023-10-18 | Stop reason: HOSPADM

## 2023-10-15 RX ORDER — QUETIAPINE FUMARATE 25 MG/1
50 TABLET, FILM COATED ORAL 3 TIMES DAILY
Status: DISCONTINUED | OUTPATIENT
Start: 2023-10-15 | End: 2023-10-18 | Stop reason: HOSPADM

## 2023-10-15 RX ORDER — LORAZEPAM 1 MG/1
1 TABLET ORAL EVERY 6 HOURS PRN
Status: DISCONTINUED | OUTPATIENT
Start: 2023-10-15 | End: 2023-10-18 | Stop reason: HOSPADM

## 2023-10-15 RX ADMIN — TRAZODONE HYDROCHLORIDE 50 MG: 50 TABLET ORAL at 20:48

## 2023-10-15 RX ADMIN — HALOPERIDOL 5 MG: 5 TABLET ORAL at 11:10

## 2023-10-15 RX ADMIN — LITHIUM CARBONATE 600 MG: 300 TABLET, FILM COATED, EXTENDED RELEASE ORAL at 20:47

## 2023-10-15 RX ADMIN — FAMOTIDINE 20 MG: 20 TABLET ORAL at 20:47

## 2023-10-15 RX ADMIN — Medication 2000 UNITS: at 07:52

## 2023-10-15 RX ADMIN — QUETIAPINE FUMARATE 50 MG: 25 TABLET ORAL at 20:47

## 2023-10-15 RX ADMIN — QUETIAPINE FUMARATE 50 MG: 25 TABLET ORAL at 18:11

## 2023-10-15 RX ADMIN — ACETAMINOPHEN 650 MG: 325 TABLET ORAL at 11:07

## 2023-10-15 RX ADMIN — FAMOTIDINE 20 MG: 20 TABLET ORAL at 07:52

## 2023-10-15 RX ADMIN — QUETIAPINE FUMARATE 50 MG: 25 TABLET ORAL at 07:52

## 2023-10-15 RX ADMIN — LORAZEPAM 1 MG: 1 TABLET ORAL at 18:11

## 2023-10-15 RX ADMIN — LORAZEPAM 1 MG: 1 TABLET ORAL at 12:25

## 2023-10-15 RX ADMIN — LEVOTHYROXINE SODIUM 75 MCG: 0.05 TABLET ORAL at 07:52

## 2023-10-15 RX ADMIN — QUETIAPINE FUMARATE 50 MG: 25 TABLET ORAL at 12:25

## 2023-10-15 RX ADMIN — HYDROXYZINE HYDROCHLORIDE 50 MG: 25 TABLET ORAL at 11:10

## 2023-10-15 ASSESSMENT — PAIN DESCRIPTION - LOCATION: LOCATION: BACK

## 2023-10-15 ASSESSMENT — PAIN DESCRIPTION - DESCRIPTORS: DESCRIPTORS: ACHING

## 2023-10-15 ASSESSMENT — PAIN SCALES - GENERAL
PAINLEVEL_OUTOF10: 3
PAINLEVEL_OUTOF10: 6
PAINLEVEL_OUTOF10: 1

## 2023-10-15 NOTE — H&P
[facility] HISTORY AND PHYSICAL    Name:  Fabian Mi  MR#:  [id number]  :  1960  ACCOUNT #:  [de-identified]  ADMIT DATE:  10/13/2023    INITIAL PSYCHIATRIC EVALUATION    CHIEF COMPLAINT:  \"I have been off my medications. \"    HISTORY OF PRESENT ILLNESS:  The patient is a 27-year-old male currently admitted at [facility] voluntarily. He presented to the emergency room after having a nervous breakdown, not sleeping, worsening mood due to being off medications for about a week. He reports that he was discharged at [facility] two weeks ago and has run out of his meds. He says that he is supposed to be on lithium 600 mg and Seroquel. He says that he is currently being followed by [facility]. He says that he also takes [facility] and reports that he is not due until another few weeks. According to the medication history, [facility] was last filled on 10/05/2023 but unsure when his last dose was. He says that he has not been sleeping for several nights. He is notably hyperverbal, tangential, disorganized during the interview. While he was in the ER, he was observed going to every person, asking for the doctors. He states that he left home in [facility] and was in [place] and now has been homeless in [facility] [place]. He, however, tells me that he lives with his aunt. He is quite a challenging historian. His lithium level was less than 0.20. His urine drug screen is positive for THC, but he is guarded about his substance use history. He says that he just wants to get some sleep. He denies suicidal ideation or homicidal ideation. He reports hearing voices on an occasional basis but declines further elaborating. PAST MEDICAL HISTORY:  See H and P.     Past Medical History:   Diagnosis Date    Bipolar 1 disorder ([address])     Depression     Vanessa ([address])     Mood disorder ([address])     Psychotic disorder ([address])     Substance abuse ([address])

## 2023-10-16 PROCEDURE — 6370000000 HC RX 637 (ALT 250 FOR IP): Performed by: NURSE PRACTITIONER

## 2023-10-16 PROCEDURE — 6370000000 HC RX 637 (ALT 250 FOR IP): Performed by: EMERGENCY MEDICINE

## 2023-10-16 PROCEDURE — 1240000000 HC EMOTIONAL WELLNESS R&B

## 2023-10-16 RX ADMIN — LITHIUM CARBONATE 600 MG: 300 TABLET, FILM COATED, EXTENDED RELEASE ORAL at 20:28

## 2023-10-16 RX ADMIN — ACETAMINOPHEN 650 MG: 325 TABLET ORAL at 17:47

## 2023-10-16 RX ADMIN — HYDROXYZINE HYDROCHLORIDE 50 MG: 25 TABLET ORAL at 12:45

## 2023-10-16 RX ADMIN — FAMOTIDINE 20 MG: 20 TABLET ORAL at 08:53

## 2023-10-16 RX ADMIN — HALOPERIDOL 5 MG: 5 TABLET ORAL at 16:21

## 2023-10-16 RX ADMIN — TRAZODONE HYDROCHLORIDE 50 MG: 50 TABLET ORAL at 20:28

## 2023-10-16 RX ADMIN — QUETIAPINE FUMARATE 50 MG: 25 TABLET ORAL at 20:28

## 2023-10-16 RX ADMIN — QUETIAPINE FUMARATE 50 MG: 25 TABLET ORAL at 08:52

## 2023-10-16 RX ADMIN — FAMOTIDINE 20 MG: 20 TABLET ORAL at 20:28

## 2023-10-16 RX ADMIN — QUETIAPINE FUMARATE 50 MG: 25 TABLET ORAL at 13:05

## 2023-10-16 RX ADMIN — ACETAMINOPHEN 650 MG: 325 TABLET ORAL at 08:56

## 2023-10-16 RX ADMIN — HYDROXYZINE HYDROCHLORIDE 50 MG: 25 TABLET ORAL at 20:29

## 2023-10-16 RX ADMIN — Medication 2000 UNITS: at 08:52

## 2023-10-16 RX ADMIN — LEVOTHYROXINE SODIUM 75 MCG: 0.05 TABLET ORAL at 08:51

## 2023-10-16 ASSESSMENT — PAIN SCALES - GENERAL
PAINLEVEL_OUTOF10: 9
PAINLEVEL_OUTOF10: 9
PAINLEVEL_OUTOF10: 0

## 2023-10-16 ASSESSMENT — PAIN - FUNCTIONAL ASSESSMENT: PAIN_FUNCTIONAL_ASSESSMENT: ACTIVITIES ARE NOT PREVENTED

## 2023-10-16 ASSESSMENT — PAIN DESCRIPTION - LOCATION
LOCATION: BACK
LOCATION: BACK;OTHER (COMMENT)

## 2023-10-16 ASSESSMENT — PAIN DESCRIPTION - DESCRIPTORS
DESCRIPTORS: ACHING
DESCRIPTORS: ACHING

## 2023-10-16 ASSESSMENT — PAIN DESCRIPTION - ORIENTATION
ORIENTATION: POSTERIOR;LOWER
ORIENTATION: RIGHT;LOWER

## 2023-10-16 NOTE — CARE COORDINATION
10/16/23 0832   ITP   Date of Plan 10/16/23   Date of Next Review 10/18/23   Primary Diagnosis Code Bipolar Disorder   Barriers to Treatment Need for psychoeducation;Psychiatric symptom (comment)  (Vanessa)   Strengths Incorporated in Plan Acknowledging need for assistance; Family supports   Plan of Care   Long Term Goal (LTG) Stated in patient/guardian terms To maintain independently in community   Short Term Goal 1   Short Term Goal 1 Client will maintain compliance with medication regime   Baseline Functioning Client reports not having medications   Target Client will be able to access, feel supported by, and comply with medication regimen   Objectives Other (comment)  (Client will take medication and report effects)   Intervention 1 Monitor medications   Frequency Daily   Measured by Staff observation;Self report   Staff Responsible Clinical staff;[facility] staff   STG Goal 1 Status: Patient Appears to be  Progressing toward treatment plan goal   Short Term Goal 2   Short Term Goal 2 Client will learn and demonstrate improved self management skills   Baseline Functioning Client homeless, multiple hospitalizations in the past few months   Target Client will maintain with supports in community   Objectives Other (comment)  (Client will understand importance of outpatient services)   Intervention 1 Referral to community services   Frequency Prior to discharge   Measured by Staff observation;Self report   Staff Responsible Clinical staff;[facility] staff   Intervention 2   (Psychoeducation)   Frequency As needed   Measured by Staff observation;Self report   Staff Responsible Clinical staff;[facility] staff   Intervention 3 Group therapy   Frequency Daily   Measured by Staff observation;Self report   Staff Responsible Clinical staff;[facility] staff   STG Goal 2 Status: Patient Appears to be  Progressing toward treatment plan goal   Crisis/Safety/Discharge Plan   Crisis/Safety Plan Standard program interventions and protocol   Comprehensive

## 2023-10-16 NOTE — GROUP NOTE
Group Therapy Note    Date: 10/16/2023    Group Start Time: 1400  Group End Time: 1500  Group Topic: Recreational    [facility] [place] 3 ACUTE BEHAV [facility]    [place], [address] Therapy Note         Patient's Goal:  To concentrate on selected task    Notes:  Pt did not attend session    Discipline Responsible: Recreational Therapist      Signature:  Jane Crisostomo

## 2023-10-16 NOTE — GROUP NOTE
Group Therapy Note    Date: 10/16/2023    Group Start Time: 1000  Group End Time: 1100  Group Topic: Topic Group    [facility] [place] 3 ACUTE BEHAV [facility]    [place]        Group Therapy Note         Patient's Goal:  To participate in relaxation activity    Notes:  Pt did not attend session    Discipline Responsible: Recreational Therapist      Signature:  Elier Platt

## 2023-10-17 LAB
DATE LAST DOSE: NORMAL
DOSE AMOUNT: NORMAL UNITS
DOSE DATE/TIME: NORMAL
LITHIUM SERPL-SCNC: 0.83 MMOL/L (ref 0.6–1.2)

## 2023-10-17 PROCEDURE — 6370000000 HC RX 637 (ALT 250 FOR IP): Performed by: NURSE PRACTITIONER

## 2023-10-17 PROCEDURE — 1240000000 HC EMOTIONAL WELLNESS R&B

## 2023-10-17 PROCEDURE — 80178 ASSAY OF LITHIUM: CPT

## 2023-10-17 PROCEDURE — 36415 COLL VENOUS BLD VENIPUNCTURE: CPT

## 2023-10-17 RX ADMIN — QUETIAPINE FUMARATE 50 MG: 25 TABLET ORAL at 13:24

## 2023-10-17 RX ADMIN — LORAZEPAM 1 MG: 1 TABLET ORAL at 11:13

## 2023-10-17 RX ADMIN — QUETIAPINE FUMARATE 50 MG: 25 TABLET ORAL at 08:56

## 2023-10-17 RX ADMIN — QUETIAPINE FUMARATE 50 MG: 25 TABLET ORAL at 11:13

## 2023-10-17 RX ADMIN — FAMOTIDINE 20 MG: 20 TABLET ORAL at 08:55

## 2023-10-17 RX ADMIN — Medication 2000 UNITS: at 08:55

## 2023-10-17 RX ADMIN — HYDROXYZINE HYDROCHLORIDE 50 MG: 25 TABLET ORAL at 09:20

## 2023-10-17 RX ADMIN — QUETIAPINE FUMARATE 50 MG: 25 TABLET ORAL at 18:32

## 2023-10-17 RX ADMIN — HALOPERIDOL 5 MG: 5 TABLET ORAL at 17:02

## 2023-10-17 RX ADMIN — LORAZEPAM 1 MG: 1 TABLET ORAL at 17:02

## 2023-10-17 RX ADMIN — LEVOTHYROXINE SODIUM 75 MCG: 0.05 TABLET ORAL at 08:55

## 2023-10-17 NOTE — GROUP NOTE
Group Therapy Note    Date: 10/17/2023    Group Start Time: 1400  Group End Time: 1500  Group Topic: Recreational    [address] 3 ACUTE BEHAV [facility]    [place], [address] Therapy Note         Patient's Goal:  To concentrate on selected task    Notes:  Pt did not attend session    Discipline Responsible: Recreational Therapist      Signature:  Juaquin Mays

## 2023-10-17 NOTE — GROUP NOTE
Group Therapy Note    Date: 10/17/2023    Group Start Time: 1000  Group End Time: 1100  Group Topic: Topic Group    [facility] [place] 3 ACUTE BEHAV [facility]    [place]        Group Therapy Note         Patient's Goal:  To participate in relaxation activity    Notes:  Pt did not attend session    Discipline Responsible: Recreational Therapist      Signature:  Anand Benson

## 2023-10-17 NOTE — PROGRESS NOTES
Laboratory Monitoring for Lithium    This patient is currently prescribed the following medication(s):   Current Facility-Administered Medications: Vitamin D (CHOLECALCIFEROL) tablet 2,000 Units, 2,000 Units, Oral, Daily  famotidine (PEPCID) tablet 20 mg, 20 mg, Oral, BID  levothyroxine (SYNTHROID) tablet 75 mcg, 75 mcg, Oral, Daily  QUEtiapine (SEROQUEL) tablet 50 mg, 50 mg, Oral, Q6H PRN  LORazepam (ATIVAN) tablet 1 mg, 1 mg, Oral, Q6H PRN  QUEtiapine (SEROQUEL) tablet 50 mg, 50 mg, Oral, TID  acetaminophen (TYLENOL) tablet 650 mg, 650 mg, Oral, Q4H PRN  polyethylene glycol (GLYCOLAX) packet 17 g, 17 g, Oral, Daily PRN  aluminum & magnesium hydroxide-simethicone (MAALOX) 200-200-20 MG/5ML suspension 30 mL, 30 mL, Oral, Q6H PRN  hydrOXYzine HCl (ATARAX) tablet 50 mg, 50 mg, Oral, TID PRN  haloperidol (HALDOL) tablet 5 mg, 5 mg, Oral, Q4H PRN **OR** haloperidol lactate (HALDOL) injection 5 mg, 5 mg, IntraMUSCular, Q4H PRN  diphenhydrAMINE (BENADRYL) injection 50 mg, 50 mg, IntraMUSCular, Q4H PRN  traZODone (DESYREL) tablet 50 mg, 50 mg, Oral, Nightly PRN  nicotine (NICODERM CQ) 21 MG/24HR 1 patch, 1 patch, TransDERmal, Daily  lithium (LITHOBID) extended release tablet 600 mg, 600 mg, Oral, QHS    The following labs have been completed for monitoring of lithium:    Lithium Serum Concentration  Lab Results   Component Value Date/Time    LITHM 0.83 10/17/2023 06:59 AM        Renal Function and Chemistry  Lab Results   Component Value Date/Time     10/13/2023 08:06 PM    K 3.4 10/13/2023 08:06 PM     10/13/2023 08:06 PM    CO2 25 10/13/2023 08:06 PM    ANIONGAP 9 10/13/2023 08:06 PM    BUN 12 10/13/2023 08:06 PM    CREATININE 0.85 10/13/2023 08:06 PM    BUNCRER 14 10/13/2023 08:06 PM       Assessment/Plan:  Lithium level obtained on 10/17 is within therapeutic limits, 0.83 mmol/L (range 0.6-1.2 mmol/L). Level was obtained at steady-state and was appropriately drawn ~10.5 hours post-dose.   Recommend to
Pharmacy Specialist, [address] [place] [address]  Desk: [phone] ([id number])  Pharmacy: [phone] ([id number])
mg/dL). Lab Results   Component Value Date/Time     10/13/2023 08:06 PM    K 3.4 10/13/2023 08:06 PM     10/13/2023 08:06 PM    CO2 25 10/13/2023 08:06 PM    ANIONGAP 9 10/13/2023 08:06 PM    GLUCOSE 118 10/13/2023 08:06 PM    BUN 12 10/13/2023 08:06 PM    CREATININE 0.85 10/13/2023 08:06 PM    BUNCRER 14 10/13/2023 08:06 PM    BILITOT 0.8 10/13/2023 08:06 PM    PROT 6.3 10/13/2023 08:06 PM    LABALBU 3.4 10/13/2023 08:06 PM    GLOB 2.9 10/13/2023 08:06 PM    ALT 50 10/13/2023 08:06 PM     10/13/2023 08:06 PM    ALKPHOS 75 10/13/2023 08:06 PM    ALKPHOS 80 05/02/2023 12:32 PM       Glucose/Hemoglobin A1c  No results found for: \"GLU\", \"GLUCPOC\"  Lab Results   Component Value Date/Time    LABA1C 5.9 05/05/2023 06:13 AM     05/05/2023 06:13 AM       Hematology  Lab Results   Component Value Date/Time    WBC 8.5 10/13/2023 08:06 PM    RBC 3.88 10/13/2023 08:06 PM    HGB 11.0 10/13/2023 08:06 PM    HCT 33.9 10/13/2023 08:06 PM    MCV 87.4 10/13/2023 08:06 PM    MCH 28.4 10/13/2023 08:06 PM    MCHC 32.4 10/13/2023 08:06 PM    RDW 12.8 10/13/2023 08:06 PM     10/13/2023 08:06 PM       Lipids  Lab Results   Component Value Date/Time    CHOL 275 05/09/2023 06:23 AM    TRIG 653 05/09/2023 06:23 AM    HDL 35 05/09/2023 06:23 AM    LDLCALC Not calculated due to elevated triglyceride level 05/09/2023 06:23 AM    LABVLDL  05/09/2023 06:23 AM     Calculation not valid with this patient's other Lipid values.     CHOLHDLRATIO 7.9 05/09/2023 06:23 AM       Thyroid Function  Lab Results   Component Value Date/Time    TSH 1.49 05/05/2023 06:13 AM       Magdaleno Schwab, PharmD, BCPS, [address]  Desk: [phone] ([id number])  Pharmacy: [phone] ([id number])
mouth daily as needed for Pain For 10 days, starting 10/13/23  Take with food   risperiDONE ER (UZEDY) subcutaneous injection  Self   Sig: Inject 0.35 mLs into the skin every 30 days         Thank you,  Ty Llanos, PharmD, BCPS

## 2023-10-17 NOTE — GROUP NOTE
Group Therapy Note    Date: 10/17/2023    Group Start Time: 830  Group End Time: 900  Group Topic: [employer]    [facility] [place] [address] ACUTE BEHAV [address] [place] [address] [address] Therapy Note    Attendees: 4         Patient's Goal:  ***    Notes:  ***    Status After Intervention:  {Status After Intervention:376115681}    Participation Level: {Participation Level:179976302}    Participation Quality: {[facility] PARTICIPATION QUALITY:045242711}      Speech:  {[facility] CD_SPEECH:58414}      Thought Process/Content: {Thought Process/Content:714612772}      Affective Functioning: {Affective Functionin}      Mood: {Mood:427582984}      Level of consciousness:  {Level of consciousness:317404873}      Response to Learnin [facility] Responses to Learnin}      Endings: {[facility] Endings:18100}    Modes of Intervention: {[facility] BHI Modes of Intervention:680667843}      Discipline Responsible: {[facility] Multidisciplinary:930651532}      Signature:  Maricruz Jiang

## 2023-10-18 VITALS
DIASTOLIC BLOOD PRESSURE: 74 MMHG | TEMPERATURE: 98.1 F | HEIGHT: 68 IN | OXYGEN SATURATION: 95 % | WEIGHT: 160 LBS | HEART RATE: 116 BPM | RESPIRATION RATE: 18 BRPM | SYSTOLIC BLOOD PRESSURE: 132 MMHG | BODY MASS INDEX: 24.25 KG/M2

## 2023-10-18 PROCEDURE — 6370000000 HC RX 637 (ALT 250 FOR IP): Performed by: NURSE PRACTITIONER

## 2023-10-18 RX ADMIN — QUETIAPINE FUMARATE 50 MG: 25 TABLET ORAL at 08:36

## 2023-10-18 RX ADMIN — LEVOTHYROXINE SODIUM 75 MCG: 0.05 TABLET ORAL at 08:34

## 2023-10-18 RX ADMIN — FAMOTIDINE 20 MG: 20 TABLET ORAL at 08:36

## 2023-10-18 RX ADMIN — Medication 2000 UNITS: at 08:37

## 2023-10-18 NOTE — DISCHARGE INSTRUCTIONS
DISCHARGE SUMMARY    NAME:Janes Souza  : 1960  MRN: [id number]    The patient Gold Dewey exhibits the ability to control behavior in a less restrictive environment. Patient's level of functioning is improving. No assaultive/destructive behavior has been observed for the past 24 hours. No suicidal/homicidal threat or behavior has been observed for the past 24 hours. There is no evidence of serious medication side effects. Patient has not been in physical or protective restraints for at least the past 24 hours. PATIENT LEFT AMA.       [facility] WARM LINE      1-826-775-MH ()      M-F: 9am to 9pm      Sat & Sun: 5pm - 9pm  National suicide prevention lines:                             9-738-GEZNTHT (1-469-087-647.349.3204)       1-175-625-TALK (3-360.895.2461)    Crisis Text Line:  Text HOME to 823073

## 2023-10-18 NOTE — DISCHARGE SUMMARY
Final    Ethanol Lvl 10/13/2023 <10  <10 MG/DL Final    Amphetamine, Urine 10/13/2023 Negative  NEG   Final    Barbiturates, Urine 10/13/2023 Negative  NEG   Final    Benzodiazepines, Urine 10/13/2023 Negative  NEG   Final    Cocaine, Urine 10/13/2023 Negative  NEG   Final    Methadone, Urine 10/13/2023 Negative  NEG   Final    Opiates, Urine 10/13/2023 Negative  NEG   Final    PCP, Urine 10/13/2023 Negative  NEG   Final    THC, TH-Cannabinol, Urine 10/13/2023 Positive (A)  NEG   Final    Comments: 10/13/2023 (NOTE)   Final    SARS-CoV-2, PCR 10/13/2023 Not detected  NOTD   Final    Rapid Influenza A By PCR 10/13/2023 Not detected    Final    Rapid Influenza B By PCR 10/13/2023 Not detected    Final    Lithium 10/17/2023 0.83  0.60 - 1.20 MMOL/L Final    DOSE DATE 10/17/2023 NOT PROVIDED    Final    Dose Date/Time 10/17/2023 NOT PROVIDED    Final    DOSE AMOUNT 10/17/2023 NOT PROVIDED  UNITS Final     No results found. DISPOSITION:    Home. Patient to f/u with drug/etoh rehabilitation, psychiatric, and psychotherapy appointments. Patient is to f/u with internist as directed. Patient should have a lithium level and associated labs checked within the next 1-2 weeks by patient's o/p psychiatrist/internist.               FOLLOW-UP CARE:    Activity as tolerated  Regular diet  Wound Care: none needed. Follow-up Information       Follow up With Specialties Details Why Contact Info    [address] [place] [address] on 10/18/2023 Rapid Access is how you start services at [facility] and are assigned to your service provider. [facility] provides ongoing mental health and substance use treatment services. Please ask about case management and medication management. You may complete a phone screening first. Call [phone] to start that process. Rapid Access Hours     Monday - Friday, 8 am - 2 pm.      Location      Main [facility] office located at [address] [place] [address], [facility]      What to Bring

## 2023-10-18 NOTE — PLAN OF CARE
P  Problem: Vanessa  Goal: Will exhibit normal sleep and speech and no impulsivity  Description: INTERVENTIONS:  1. Administer medication as ordered  2. Set limits on impulsive behavior  3. Make attempts to decrease external stimuli as possible  10/15/2023 2215 by Arturo Umana RN  Outcome: Not Progressing  10/15/2023 1043 by Garth Coyle RN  Outcome: Not Progressing     Problem: Behavior  Goal: Pt/Family maintain appropriate behavior and adhere to behavioral management agreement, if implemented  Description: INTERVENTIONS:  1. Assess patient/family's coping skills and  non-compliant behavior (including use of illegal substances)  2. Notify security of behavior or suspected illegal substances which indicate the need for search of the family and/or belongings  3. Encourage verbalization of thoughts and concerns in a socially appropriate manner  4. Utilize positive, consistent limit setting strategies supporting safety of patient, staff and others  5. Encourage participation in the decision making process about the behavioral management agreement  6. If a visitor's behavior poses a threat to safety call refer to organization policy.   7. Initiate consult with , Psychosocial CNS, Spiritual Care as appropriate  10/15/2023 2215 by Arturo Umana RN  Outcome: Not Progressing  10/15/2023 1043 by Garth Coyle RN  Outcome: Progressing
Problem: Behavior  Goal: Pt/Family maintain appropriate behavior and adhere to behavioral management agreement, if implemented  Description: INTERVENTIONS:  1. Assess patient/family's coping skills and  non-compliant behavior (including use of illegal substances)  2. Notify security of behavior or suspected illegal substances which indicate the need for search of the family and/or belongings  3. Encourage verbalization of thoughts and concerns in a socially appropriate manner  4. Utilize positive, consistent limit setting strategies supporting safety of patient, staff and others  5. Encourage participation in the decision making process about the behavioral management agreement  6. If a visitor's behavior poses a threat to safety call refer to organization policy. 7. Initiate consult with , Psychosocial CNS, Spiritual Care as appropriate  Recent Flowsheet Documentation  Taken 10/16/2023 0820 by Yessy Pineda RN  Patient/family maintains appropriate behavior and adheres to behavioral management agreement, if implemented: Utilize positive, consistent limit setting strategies supporting safety of patient, staff and others     Problem: Psychosis  Goal: Will report no hallucinations or delusions  Description: INTERVENTIONS:  1. Administer medication as  ordered  2. Assist with reality testing to support increasing orientation  3.  Assess if patient's hallucinations or delusions are encouraging self harm or harm to others and intervene as appropriate  Outcome: Progressing
Problem: Discharge Planning  Goal: Discharge to home or other facility with appropriate resources  Outcome: Adequate for Discharge     Problem: Vanessa  Goal: Will exhibit normal sleep and speech and no impulsivity  Description: INTERVENTIONS:  1. Administer medication as ordered  2. Set limits on impulsive behavior  3. Make attempts to decrease external stimuli as possible  Outcome: Adequate for Discharge     Problem: Psychosis  Goal: Will report no hallucinations or delusions  Description: INTERVENTIONS:  1. Administer medication as  ordered  2. Assist with reality testing to support increasing orientation  3. Assess if patient's hallucinations or delusions are encouraging self harm or harm to others and intervene as appropriate  Outcome: Adequate for Discharge     Problem: Behavior  Goal: Pt/Family maintain appropriate behavior and adhere to behavioral management agreement, if implemented  Description: INTERVENTIONS:  1. Assess patient/family's coping skills and  non-compliant behavior (including use of illegal substances)  2. Notify security of behavior or suspected illegal substances which indicate the need for search of the family and/or belongings  3. Encourage verbalization of thoughts and concerns in a socially appropriate manner  4. Utilize positive, consistent limit setting strategies supporting safety of patient, staff and others  5. Encourage participation in the decision making process about the behavioral management agreement  6. If a visitor's behavior poses a threat to safety call refer to organization policy. 7. Initiate consult with , Psychosocial CNS, Spiritual Care as appropriate  Outcome: Adequate for Discharge     Problem: Anxiety  Goal: Will report anxiety at manageable levels  Description: INTERVENTIONS:  1. Administer medication as ordered  2. Teach and rehearse alternative coping skills  3.  Provide emotional support with 1:1 interaction with staff  Outcome: Adequate for
Problem: Vanessa  Goal: Will exhibit normal sleep and speech and no impulsivity  Description: INTERVENTIONS:  1. Administer medication as ordered  2. Set limits on impulsive behavior  3. Make attempts to decrease external stimuli as possible  10/14/2023 1052 by Jovan Casanova RN  Outcome: Not Progressing  10/14/2023 0205 by Amrik Dubon RN  Outcome: Not Progressing     Problem: Psychosis  Goal: Will report no hallucinations or delusions  Description: INTERVENTIONS:  1. Administer medication as  ordered  2. Assist with reality testing to support increasing orientation  3. Assess if patient's hallucinations or delusions are encouraging self harm or harm to others and intervene as appropriate  10/14/2023 0205 by Amrik Dubon RN  Outcome: Not Progressing     Problem: Behavior  Goal: Pt/Family maintain appropriate behavior and adhere to behavioral management agreement, if implemented  Description: INTERVENTIONS:  1. Assess patient/family's coping skills and  non-compliant behavior (including use of illegal substances)  2. Notify security of behavior or suspected illegal substances which indicate the need for search of the family and/or belongings  3. Encourage verbalization of thoughts and concerns in a socially appropriate manner  4. Utilize positive, consistent limit setting strategies supporting safety of patient, staff and others  5. Encourage participation in the decision making process about the behavioral management agreement  6. If a visitor's behavior poses a threat to safety call refer to organization policy. 7. Initiate consult with , Psychosocial CNS, Spiritual Care as appropriate  10/14/2023 0205 by Amrik Dubon RN  Outcome: Not Progressing     Problem: Anxiety  Goal: Will report anxiety at manageable levels  Description: INTERVENTIONS:  1. Administer medication as ordered  2. Teach and rehearse alternative coping skills  3.  Provide emotional support with 1:1
Problem: Vanessa  Goal: Will exhibit normal sleep and speech and no impulsivity  Description: INTERVENTIONS:  1. Administer medication as ordered  2. Set limits on impulsive behavior  3. Make attempts to decrease external stimuli as possible  10/15/2023 1043 by Toni Luis RN  Outcome: Not Progressing  10/14/2023 2109 by Justyn Cutler RN  Outcome: Not Progressing     Problem: Anxiety  Goal: Will report anxiety at manageable levels  Description: INTERVENTIONS:  1. Administer medication as ordered  2. Teach and rehearse alternative coping skills  3.  Provide emotional support with 1:1 interaction with staff  10/15/2023 1043 by Toni Luis RN  Outcome: Not Progressing  10/14/2023 2109 by Justyn Cutler RN  Outcome: Progressing
Problem: Vanessa  Goal: Will exhibit normal sleep and speech and no impulsivity  Description: INTERVENTIONS:  1. Administer medication as ordered  2. Set limits on impulsive behavior  3. Make attempts to decrease external stimuli as possible  Outcome: Not Progressing     Problem: Psychosis  Goal: Will report no hallucinations or delusions  Description: INTERVENTIONS:  1. Administer medication as  ordered  2. Assist with reality testing to support increasing orientation  3. Assess if patient's hallucinations or delusions are encouraging self harm or harm to others and intervene as appropriate  Outcome: Not Progressing     Problem: Behavior  Goal: Pt/Family maintain appropriate behavior and adhere to behavioral management agreement, if implemented  Description: INTERVENTIONS:  1. Assess patient/family's coping skills and  non-compliant behavior (including use of illegal substances)  2. Notify security of behavior or suspected illegal substances which indicate the need for search of the family and/or belongings  3. Encourage verbalization of thoughts and concerns in a socially appropriate manner  4. Utilize positive, consistent limit setting strategies supporting safety of patient, staff and others  5. Encourage participation in the decision making process about the behavioral management agreement  6. If a visitor's behavior poses a threat to safety call refer to organization policy. 7. Initiate consult with , Psychosocial CNS, Spiritual Care as appropriate  Outcome: Not Progressing     Problem: Anxiety  Goal: Will report anxiety at manageable levels  Description: INTERVENTIONS:  1. Administer medication as ordered  2. Teach and rehearse alternative coping skills  3.  Provide emotional support with 1:1 interaction with staff  Outcome: Not Progressing
Progressing  10/14/2023 1052 by Rory Hopkins RN  Outcome: Progressing     Problem: Vanessa  Goal: Will exhibit normal sleep and speech and no impulsivity  Description: INTERVENTIONS:  1. Administer medication as ordered  2. Set limits on impulsive behavior  3.  Make attempts to decrease external stimuli as possible  10/14/2023 2109 by Castillo Hartley RN  Outcome: Not Progressing  10/14/2023 1052 by Rory Hopkins RN  Outcome: Not Progressing

## 2023-10-18 NOTE — CARE COORDINATION
10/18/23 1303   ITP   Date of Plan 10/18/23   Primary Diagnosis Code Bipolar Disorder   Short Term Goal 1   STG Goal 1 Status: Patient Appears to be    (Goal not met, discharging AMA)   Short Term Goal 2   STG Goal 2 Status: Patient Appears to be    (Goal not met, discharging AMA)     BRADEN Lee

## 2024-05-20 ENCOUNTER — APPOINTMENT (OUTPATIENT)
Facility: HOSPITAL | Age: 64
End: 2024-05-20
Payer: MEDICAID

## 2024-05-20 ENCOUNTER — HOSPITAL ENCOUNTER (EMERGENCY)
Facility: HOSPITAL | Age: 64
Discharge: HOME | End: 2024-05-20
Attending: EMERGENCY MEDICINE
Payer: MEDICAID

## 2024-05-20 VITALS
HEART RATE: 88 BPM | RESPIRATION RATE: 18 BRPM | DIASTOLIC BLOOD PRESSURE: 72 MMHG | SYSTOLIC BLOOD PRESSURE: 115 MMHG | OXYGEN SATURATION: 100 % | TEMPERATURE: 98 F

## 2024-05-20 DIAGNOSIS — S09.90XA CLOSED HEAD INJURY, INITIAL ENCOUNTER: ICD-10-CM

## 2024-05-20 DIAGNOSIS — M50.30 DDD (DEGENERATIVE DISC DISEASE), CERVICAL: ICD-10-CM

## 2024-05-20 DIAGNOSIS — Z23 NEED FOR TETANUS BOOSTER: ICD-10-CM

## 2024-05-20 DIAGNOSIS — M79.671 PAIN IN BOTH FEET: ICD-10-CM

## 2024-05-20 DIAGNOSIS — S00.83XA CONTUSION OF FACE, INITIAL ENCOUNTER: ICD-10-CM

## 2024-05-20 DIAGNOSIS — S22.31XA CLOSED FRACTURE OF ONE RIB OF RIGHT SIDE, INITIAL ENCOUNTER: Primary | ICD-10-CM
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
## 2024-05-20 DIAGNOSIS — M79.672 PAIN IN BOTH FEET: ICD-10-CM

## 2024-05-20 DIAGNOSIS — Z59.01 SHELTERED HOMELESSNESS: ICD-10-CM

## 2024-05-20 PROCEDURE — 73630 X-RAY EXAM OF FOOT: CPT

## 2024-05-20 PROCEDURE — 71250 CT THORAX DX C-: CPT

## 2024-05-20 PROCEDURE — 6360000002 HC RX W HCPCS: Performed by: EMERGENCY MEDICINE

## 2024-05-20 PROCEDURE — 96372 THER/PROPH/DIAG INJ SC/IM: CPT

## 2024-05-20 PROCEDURE — 70450 CT HEAD/BRAIN W/O DYE: CPT

## 2024-05-20 PROCEDURE — 99284 EMERGENCY DEPT VISIT MOD MDM: CPT

## 2024-05-20 PROCEDURE — 72125 CT NECK SPINE W/O DYE: CPT

## 2024-05-20 PROCEDURE — 6370000000 HC RX 637 (ALT 250 FOR IP): Performed by: EMERGENCY MEDICINE

## 2024-05-20 PROCEDURE — 70486 CT MAXILLOFACIAL W/O DYE: CPT

## 2024-05-20 RX ORDER — LIDOCAINE 50 MG/G
1 PATCH TOPICAL DAILY
Qty: 10 PATCH | Refills: 0 | Status: SHIPPED | OUTPATIENT
Start: 2024-05-20 | End: 2024-05-30

## 2024-05-20 RX ORDER — ACETAMINOPHEN 500 MG
1000 TABLET ORAL
Status: COMPLETED | OUTPATIENT
Start: 2024-05-20 | End: 2024-05-20

## 2024-05-20 RX ORDER — ACETAMINOPHEN 325 MG/1
650 TABLET ORAL EVERY 6 HOURS PRN
Qty: 30 TABLET | Refills: 0 | Status: SHIPPED | OUTPATIENT
Start: 2024-05-20

## 2024-05-20 RX ORDER — KETOROLAC TROMETHAMINE 30 MG/ML
30 INJECTION, SOLUTION INTRAMUSCULAR; INTRAVENOUS ONCE
Status: COMPLETED | OUTPATIENT
Start: 2024-05-20 | End: 2024-05-20

## 2024-05-20 RX ORDER — LIDOCAINE 4 G/G
1 PATCH TOPICAL
Status: DISCONTINUED | OUTPATIENT
Start: 2024-05-20 | End: 2024-05-20 | Stop reason: HOSPADM

## 2024-05-20 RX ADMIN — KETOROLAC TROMETHAMINE 30 MG: 30 INJECTION, SOLUTION INTRAMUSCULAR; INTRAVENOUS at 04:22

## 2024-05-20 RX ADMIN — ACETAMINOPHEN 1000 MG: 500 TABLET ORAL at 04:22

## 2024-05-20 SDOH — ECONOMIC STABILITY - HOUSING INSECURITY: SHELTERED HOMELESSNESS: Z59.01

## 2024-05-20 ASSESSMENT — ENCOUNTER SYMPTOMS
ABDOMINAL PAIN: 0
SHORTNESS OF BREATH: 0
VOMITING: 0
SORE THROAT: 0
COUGH: 0
EYE PAIN: 0
DIARRHEA: 0
FACIAL SWELLING: 1
RHINORRHEA: 0
NAUSEA: 0

## 2024-05-20 ASSESSMENT — PAIN SCALES - GENERAL: PAINLEVEL_OUTOF10: 10

## 2024-05-20 NOTE — DISCHARGE INSTRUCTIONS
Services  [address] [place], [place] [address], [place] [phone] [phone]      Community Service Boards [facility]     [phone]    [facility]      [phone]    [facility]       [phone]    [facility]      [phone]      Services for patients without Medicaid, Medicare or Insurance  [facility]       [phone]   See handout in separate folder    [facility]--Keshawn Solano    [phone]
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
## 2024-05-28 ENCOUNTER — HOSPITAL ENCOUNTER (EMERGENCY)
Facility: HOSPITAL | Age: 64
Discharge: HOME | End: 2024-05-28
Attending: STUDENT IN AN ORGANIZED HEALTH CARE EDUCATION/TRAINING PROGRAM
Payer: MEDICARE

## 2024-05-28 VITALS
DIASTOLIC BLOOD PRESSURE: 64 MMHG | RESPIRATION RATE: 17 BRPM | TEMPERATURE: 98.4 F | SYSTOLIC BLOOD PRESSURE: 128 MMHG | OXYGEN SATURATION: 98 % | HEART RATE: 80 BPM

## 2024-05-28 DIAGNOSIS — M79.673 CHRONIC FOOT PAIN, UNSPECIFIED LATERALITY: Primary | ICD-10-CM

## 2024-05-28 DIAGNOSIS — G89.29 CHRONIC FOOT PAIN, UNSPECIFIED LATERALITY: Primary | ICD-10-CM

## 2024-05-28 PROCEDURE — 99283 EMERGENCY DEPT VISIT LOW MDM: CPT

## 2024-05-28 NOTE — ED NOTES
Discharge instructions given to patient by MD and RN. Patient has been given counseling on medication use and verbalizes understanding.
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
## 2024-05-30 ENCOUNTER — HOSPITAL ENCOUNTER (EMERGENCY)
Facility: HOSPITAL | Age: 64
Discharge: LEFT BEFORE DISCHARGE | End: 2024-05-30
Attending: EMERGENCY MEDICINE
Payer: MEDICAID

## 2024-05-30 VITALS
TEMPERATURE: 98 F | OXYGEN SATURATION: 96 % | RESPIRATION RATE: 18 BRPM | DIASTOLIC BLOOD PRESSURE: 56 MMHG | HEART RATE: 95 BPM | SYSTOLIC BLOOD PRESSURE: 111 MMHG

## 2024-05-30 DIAGNOSIS — R52 BODY ACHES: Primary | ICD-10-CM

## 2024-05-30 LAB
ANION GAP SERPL CALC-SCNC: 4 MMOL/L (ref 5–15)
BASOPHILS # BLD: 0 K/UL (ref 0–0.1)
BASOPHILS NFR BLD: 0 % (ref 0–1)
BUN SERPL-MCNC: 14 MG/DL (ref 6–20)
BUN/CREAT SERPL: 21 (ref 12–20)
CALCIUM SERPL-MCNC: 8.9 MG/DL (ref 8.5–10.1)
CHLORIDE SERPL-SCNC: 104 MMOL/L (ref 97–108)
CK SERPL-CCNC: 182 U/L (ref 39–308)
CO2 SERPL-SCNC: 26 MMOL/L (ref 21–32)
COMMENT:: NORMAL
CREAT SERPL-MCNC: 0.68 MG/DL (ref 0.7–1.3)
DIFFERENTIAL METHOD BLD: ABNORMAL
EOSINOPHIL # BLD: 0.2 K/UL (ref 0–0.4)
EOSINOPHIL NFR BLD: 2 % (ref 0–7)
ERYTHROCYTE [DISTWIDTH] IN BLOOD BY AUTOMATED COUNT: 14.6 % (ref 11.5–14.5)
GLUCOSE SERPL-MCNC: 126 MG/DL (ref 65–100)
HCT VFR BLD AUTO: 31.1 % (ref 36.6–50.3)
HGB BLD-MCNC: 10 G/DL (ref 12.1–17)
IMM GRANULOCYTES # BLD AUTO: 0.1 K/UL (ref 0–0.04)
IMM GRANULOCYTES NFR BLD AUTO: 1 % (ref 0–0.5)
LYMPHOCYTES # BLD: 1.3 K/UL (ref 0.8–3.5)
LYMPHOCYTES NFR BLD: 13 % (ref 12–49)
MCH RBC QN AUTO: 27.5 PG (ref 26–34)
MCHC RBC AUTO-ENTMCNC: 32.2 G/DL (ref 30–36.5)
MCV RBC AUTO: 85.4 FL (ref 80–99)
MONOCYTES # BLD: 0.5 K/UL (ref 0–1)
MONOCYTES NFR BLD: 5 % (ref 5–13)
NEUTS SEG # BLD: 7.8 K/UL (ref 1.8–8)
NEUTS SEG NFR BLD: 79 % (ref 32–75)
NRBC # BLD: 0 K/UL (ref 0–0.01)
NRBC BLD-RTO: 0 PER 100 WBC
PLATELET # BLD AUTO: 429 K/UL (ref 150–400)
PMV BLD AUTO: 8.2 FL (ref 8.9–12.9)
POTASSIUM SERPL-SCNC: 3.9 MMOL/L (ref 3.5–5.1)
RBC # BLD AUTO: 3.64 M/UL (ref 4.1–5.7)
SODIUM SERPL-SCNC: 134 MMOL/L (ref 136–145)
SPECIMEN HOLD: NORMAL
WBC # BLD AUTO: 9.9 K/UL (ref 4.1–11.1)

## 2024-05-30 PROCEDURE — 36415 COLL VENOUS BLD VENIPUNCTURE: CPT

## 2024-05-30 PROCEDURE — 85025 COMPLETE CBC W/AUTO DIFF WBC: CPT

## 2024-05-30 PROCEDURE — 99283 EMERGENCY DEPT VISIT LOW MDM: CPT

## 2024-05-30 PROCEDURE — 80048 BASIC METABOLIC PNL TOTAL CA: CPT

## 2024-05-30 PROCEDURE — 82550 ASSAY OF CK (CPK): CPT

## 2024-05-30 ASSESSMENT — PAIN - FUNCTIONAL ASSESSMENT
PAIN_FUNCTIONAL_ASSESSMENT: 0-10
PAIN_FUNCTIONAL_ASSESSMENT: ACTIVITIES ARE NOT PREVENTED

## 2024-05-30 ASSESSMENT — PAIN DESCRIPTION - FREQUENCY: FREQUENCY: CONTINUOUS

## 2024-05-30 ASSESSMENT — PAIN DESCRIPTION - LOCATION: LOCATION: BACK

## 2024-05-30 ASSESSMENT — PAIN DESCRIPTION - ONSET: ONSET: ON-GOING

## 2024-05-30 ASSESSMENT — PAIN DESCRIPTION - DESCRIPTORS: DESCRIPTORS: ACHING

## 2024-05-30 ASSESSMENT — PAIN DESCRIPTION - ORIENTATION: ORIENTATION: LOWER

## 2024-05-30 ASSESSMENT — PAIN DESCRIPTION - PAIN TYPE: TYPE: CHRONIC PAIN

## 2024-05-30 ASSESSMENT — PAIN SCALES - GENERAL: PAINLEVEL_OUTOF10: 10
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
## 2024-06-01 ENCOUNTER — HOSPITAL ENCOUNTER (EMERGENCY)
Facility: HOSPITAL | Age: 64
Discharge: HOME | End: 2024-06-01
Payer: MEDICAID

## 2024-06-01 VITALS
RESPIRATION RATE: 18 BRPM | SYSTOLIC BLOOD PRESSURE: 110 MMHG | HEART RATE: 80 BPM | OXYGEN SATURATION: 100 % | TEMPERATURE: 98.9 F | DIASTOLIC BLOOD PRESSURE: 68 MMHG

## 2024-06-01 DIAGNOSIS — M79.671 BILATERAL FOOT PAIN: Primary | ICD-10-CM

## 2024-06-01 DIAGNOSIS — Z76.0 ENCOUNTER FOR MEDICATION REFILL: ICD-10-CM

## 2024-06-01 DIAGNOSIS — M79.672 BILATERAL FOOT PAIN: Primary | ICD-10-CM

## 2024-06-01 PROCEDURE — 6370000000 HC RX 637 (ALT 250 FOR IP): Performed by: PHYSICIAN ASSISTANT

## 2024-06-01 PROCEDURE — 99283 EMERGENCY DEPT VISIT LOW MDM: CPT

## 2024-06-01 RX ORDER — NAPROXEN 500 MG/1
500 TABLET ORAL DAILY PRN
Qty: 20 TABLET | Refills: 0 | Status: SHIPPED | OUTPATIENT
Start: 2024-06-01

## 2024-06-01 RX ORDER — ACETAMINOPHEN 325 MG/1
650 TABLET ORAL EVERY 6 HOURS PRN
Qty: 30 TABLET | Refills: 0 | Status: SHIPPED | OUTPATIENT
Start: 2024-06-01

## 2024-06-01 RX ORDER — IBUPROFEN 600 MG/1
600 TABLET ORAL
Status: COMPLETED | OUTPATIENT
Start: 2024-06-01 | End: 2024-06-01

## 2024-06-01 RX ORDER — QUETIAPINE FUMARATE 300 MG/1
300 TABLET, FILM COATED ORAL
Qty: 30 TABLET | Refills: 0 | Status: SHIPPED | OUTPATIENT
Start: 2024-06-01 | End: 2024-07-01

## 2024-06-01 RX ADMIN — IBUPROFEN 600 MG: 600 TABLET, FILM COATED ORAL at 12:29

## 2024-06-01 ASSESSMENT — PAIN SCALES - GENERAL: PAINLEVEL_OUTOF10: 10

## 2024-06-01 ASSESSMENT — PAIN DESCRIPTION - LOCATION: LOCATION: BACK

## 2024-06-01 ASSESSMENT — PAIN DESCRIPTION - DESCRIPTORS: DESCRIPTORS: ACHING

## 2024-06-01 ASSESSMENT — PAIN DESCRIPTION - ORIENTATION: ORIENTATION: LOWER

## 2024-06-01 ASSESSMENT — PAIN - FUNCTIONAL ASSESSMENT: PAIN_FUNCTIONAL_ASSESSMENT: 0-10

## 2024-06-01 NOTE — ED PROVIDER NOTES
understanding conveyed, and agreed upon. The patient is to follow up as recommended or return to ER should their symptoms worsen.      PATIENT REFERRED TO:  [facility]  [address] [place] [address] [phone]  Schedule an appointment as soon as possible for a visit   As needed    [facility]  [address] [facility] [address]  [phone]           DISCHARGE MEDICATIONS:     Medication List        CHANGE how you take these medications      naproxen 500 MG tablet  Commonly known as: NAPROSYN  Take 1 tablet by mouth daily as needed for Pain  What changed: additional instructions            CONTINUE taking these medications      acetaminophen 325 MG tablet  Commonly known as: Tylenol  Take 2 tablets by mouth every 6 hours as needed for Pain     QUEtiapine 300 MG tablet  Commonly known as: SEROQUEL  Take 1 tablet by mouth nightly            ASK your doctor about these medications      famotidine 20 MG tablet  Commonly known as: PEPCID     GNP Vitamin C 500 MG tablet  Generic drug: ascorbic acid     levothyroxine 75 MCG tablet  Commonly known as: SYNTHROID     lithium 300 MG extended release tablet  Commonly known as: LITHOBID     Uzedy subcutaneous injection  Generic drug: risperiDONE ER     Vitamin D3 50 MCG (2000 UT) Tabs               Where to Get Your Medications        These medications were sent to [id number] - [place], [place] - [address] [place] [address] -  [phone] - F [phone] [address] [facility] [address]      Phone: [phone]   acetaminophen 325 MG tablet  naproxen 500 MG tablet  QUEtiapine 300 MG tablet           DISCONTINUED MEDICATIONS:  Current Discharge Medication List          I have seen and evaluated the patient autonomously. My supervision physician was on site and available for consultation if needed.     I am the Primary Clinician of Record.   Daniela Weeks PA-C (electronically signed)    (Please note that parts of this  dictation were completed with voice recognition software. Quite often unanticipated grammatical, syntax, homophones, and other interpretive errors are inadvertently transcribed by the computer software. Please disregards these errors. Please excuse any errors that have escaped final proofreading.)     Daniela Weeks PA-C  06/01/24 8431

## 2024-06-01 NOTE — ED PROVIDER NOTES
explained this to the patient he is requesting to be able to sleep in the waiting room explained to the patient that this is inappropriate patient became extremely aggressive and agitated after being discharged security had to be involved to have patient discharged in the waiting room.            REASSESSMENT            CONSULTS:  None    PROCEDURES:  Unless otherwise noted below, none     Procedures      FINAL IMPRESSION      1. Chronic foot pain, unspecified laterality          DISPOSITION/PLAN   DISPOSITION Decision To Discharge 05/28/2024 04:00:05 AM      PATIENT REFERRED TO:  [facility] EMERGENCY DEP  [address] [place] [address] [phone]    If symptoms worsen      DISCHARGE MEDICATIONS:  Discharge Medication List as of 5/28/2024  4:00 AM            (Please note that portions of this note were completed with a voice recognition program.  Efforts were made to edit the dictations but occasionally words are mis-transcribed.)    Mario Resendiz MD (electronically signed)  Emergency Attending Physician / Physician Assistant / Nurse Practitioner            Mario Resendiz MD  06/01/24 [id number]

## 2024-06-01 NOTE — ED NOTES
Pt left ED with his personal belongings still cussing and swearing at no one in particular. Pt is ambulatory at his baseline.
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
## 2024-06-08 ENCOUNTER — HOSPITAL ENCOUNTER (EMERGENCY)
Facility: HOSPITAL | Age: 64
Discharge: HOME | End: 2024-06-08
Payer: MEDICAID

## 2024-06-08 VITALS
DIASTOLIC BLOOD PRESSURE: 70 MMHG | HEART RATE: 77 BPM | BODY MASS INDEX: 20.08 KG/M2 | RESPIRATION RATE: 17 BRPM | SYSTOLIC BLOOD PRESSURE: 117 MMHG | TEMPERATURE: 98.1 F | OXYGEN SATURATION: 99 % | WEIGHT: 132.06 LBS

## 2024-06-08 DIAGNOSIS — F31.9 BIPOLAR 1 DISORDER (HCC): ICD-10-CM

## 2024-06-08 DIAGNOSIS — Z76.5 MALINGERING: ICD-10-CM

## 2024-06-08 DIAGNOSIS — Z91.148 NON COMPLIANCE W MEDICATION REGIMEN: Primary | ICD-10-CM

## 2024-06-08 PROCEDURE — 99283 EMERGENCY DEPT VISIT LOW MDM: CPT

## 2024-06-11 ENCOUNTER — HOSPITAL ENCOUNTER (EMERGENCY)
Facility: HOSPITAL | Age: 64
Discharge: HOME | End: 2024-06-11
Attending: EMERGENCY MEDICINE
Payer: MEDICAID

## 2024-06-11 VITALS
HEIGHT: 68 IN | BODY MASS INDEX: 21.98 KG/M2 | WEIGHT: 145 LBS | TEMPERATURE: 97.7 F | RESPIRATION RATE: 20 BRPM | DIASTOLIC BLOOD PRESSURE: 75 MMHG | SYSTOLIC BLOOD PRESSURE: 135 MMHG | HEART RATE: 98 BPM | OXYGEN SATURATION: 100 %

## 2024-06-11 DIAGNOSIS — R07.89 CHEST WALL PAIN: ICD-10-CM

## 2024-06-11 DIAGNOSIS — Z87.81 HISTORY OF RIB FRACTURE: Primary | ICD-10-CM

## 2024-06-11 PROCEDURE — 99282 EMERGENCY DEPT VISIT SF MDM: CPT

## 2024-06-11 ASSESSMENT — PAIN DESCRIPTION - DESCRIPTORS: DESCRIPTORS: ACHING

## 2024-06-11 ASSESSMENT — PAIN DESCRIPTION - LOCATION: LOCATION: RIB CAGE

## 2024-06-11 ASSESSMENT — PAIN SCALES - GENERAL: PAINLEVEL_OUTOF10: 10

## 2024-06-11 ASSESSMENT — PAIN DESCRIPTION - ORIENTATION: ORIENTATION: RIGHT

## 2024-06-11 ASSESSMENT — PAIN - FUNCTIONAL ASSESSMENT: PAIN_FUNCTIONAL_ASSESSMENT: 0-10

## 2024-06-11 ASSESSMENT — PAIN DESCRIPTION - PAIN TYPE: TYPE: ACUTE PAIN

## 2024-06-12 ENCOUNTER — HOSPITAL ENCOUNTER (EMERGENCY)
Facility: HOSPITAL | Age: 64
Discharge: HOME | End: 2024-06-12
Attending: EMERGENCY MEDICINE
Payer: MEDICAID

## 2024-06-12 VITALS
BODY MASS INDEX: 21.98 KG/M2 | WEIGHT: 145 LBS | DIASTOLIC BLOOD PRESSURE: 81 MMHG | HEIGHT: 68 IN | SYSTOLIC BLOOD PRESSURE: 125 MMHG | TEMPERATURE: 98.5 F | OXYGEN SATURATION: 100 % | RESPIRATION RATE: 16 BRPM | HEART RATE: 73 BPM

## 2024-06-12 DIAGNOSIS — Z00.8 MEDICAL CLEARANCE FOR INCARCERATION: Primary | ICD-10-CM

## 2024-06-12 PROCEDURE — 99282 EMERGENCY DEPT VISIT SF MDM: CPT

## 2024-06-12 ASSESSMENT — PAIN - FUNCTIONAL ASSESSMENT: PAIN_FUNCTIONAL_ASSESSMENT: NONE - DENIES PAIN

## 2024-06-12 NOTE — ED NOTES
Discharge instructions were given to the patient by Any MÉNDZE.     The patient left the Emergency Department alert and oriented and in no acute distress with 0 prescriptions. The patient was encouraged to call or return to the ED for worsening issues or problems and was encouraged to schedule a follow up appointment for continuing care.     Ambulation assessment completed before discharge.  Pt left Emergency Department ambulating at baseline with no ortho devices  Ortho device education: none    The patient verbalized understanding of discharge instructions and prescriptions, all questions were answered. The patient has no further concerns at this time.
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
## 2024-06-12 NOTE — ED NOTES
Discharge instructions were given to the patient by Any MÉNDEZ.     The patient left the Emergency Department alert and oriented and in no acute distress with 0 prescriptions. The patient was encouraged to call or return to the ED for worsening issues or problems and was encouraged to schedule a follow up appointment for continuing care.     Ambulation assessment completed before discharge.  Pt left Emergency Department ambulating at baseline with no ortho devices  Ortho device education: none    The patient verbalized understanding of discharge instructions and prescriptions, all questions were answered. The patient has no further concerns at this time.   Pt escorted out by [facility].
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
## 2025-03-09 ENCOUNTER — HOSPITAL ENCOUNTER (EMERGENCY)
Facility: HOSPITAL | Age: 65
Discharge: HOME | End: 2025-03-09
Attending: EMERGENCY MEDICINE
Payer: MEDICARE

## 2025-03-09 VITALS
BODY MASS INDEX: 25.76 KG/M2 | TEMPERATURE: 97.5 F | RESPIRATION RATE: 16 BRPM | WEIGHT: 170 LBS | HEART RATE: 98 BPM | DIASTOLIC BLOOD PRESSURE: 79 MMHG | HEIGHT: 68 IN | SYSTOLIC BLOOD PRESSURE: 113 MMHG | OXYGEN SATURATION: 95 %

## 2025-03-09 DIAGNOSIS — R11.2 NAUSEA AND VOMITING, UNSPECIFIED VOMITING TYPE: Primary | ICD-10-CM

## 2025-03-09 DIAGNOSIS — B34.9 VIRAL ILLNESS: ICD-10-CM

## 2025-03-09 LAB
ALBUMIN SERPL-MCNC: 3.9 G/DL (ref 3.5–5)
ALBUMIN/GLOB SERPL: 1.1 (ref 1.1–2.2)
ALP SERPL-CCNC: 97 U/L (ref 45–117)
ALT SERPL-CCNC: 15 U/L (ref 12–78)
ANION GAP SERPL CALC-SCNC: 7 MMOL/L (ref 2–12)
APPEARANCE UR: CLEAR
AST SERPL-CCNC: 18 U/L (ref 15–37)
BACTERIA URNS QL MICRO: NEGATIVE /HPF
BASOPHILS # BLD: 0.02 K/UL (ref 0–0.1)
BASOPHILS NFR BLD: 0.2 % (ref 0–1)
BILIRUB SERPL-MCNC: 0.6 MG/DL (ref 0.2–1)
BILIRUB UR QL: NEGATIVE
BUN SERPL-MCNC: 13 MG/DL (ref 6–20)
BUN/CREAT SERPL: 11 (ref 12–20)
CALCIUM SERPL-MCNC: 8.5 MG/DL (ref 8.5–10.1)
CHLORIDE SERPL-SCNC: 102 MMOL/L (ref 97–108)
CO2 SERPL-SCNC: 27 MMOL/L (ref 21–32)
COLOR UR: ABNORMAL
CREAT SERPL-MCNC: 1.23 MG/DL (ref 0.7–1.3)
DATE LAST DOSE: NORMAL
DIFFERENTIAL METHOD BLD: ABNORMAL
DOSE AMOUNT: NORMAL UNITS
DOSE DATE/TIME: NORMAL
EOSINOPHIL # BLD: 0.04 K/UL (ref 0–0.4)
EOSINOPHIL NFR BLD: 0.4 % (ref 0–7)
EPITH CASTS URNS QL MICRO: ABNORMAL /LPF
ERYTHROCYTE [DISTWIDTH] IN BLOOD BY AUTOMATED COUNT: 14 % (ref 11.5–14.5)
GLOBULIN SER CALC-MCNC: 3.4 G/DL (ref 2–4)
GLUCOSE SERPL-MCNC: 116 MG/DL (ref 65–100)
GLUCOSE UR STRIP.AUTO-MCNC: NEGATIVE MG/DL
HCT VFR BLD AUTO: 39.5 % (ref 36.6–50.3)
HGB BLD-MCNC: 12.5 G/DL (ref 12.1–17)
HGB UR QL STRIP: NEGATIVE
IMM GRANULOCYTES # BLD AUTO: 0.03 K/UL (ref 0–0.04)
IMM GRANULOCYTES NFR BLD AUTO: 0.3 % (ref 0–0.5)
KETONES UR QL STRIP.AUTO: NEGATIVE MG/DL
LEUKOCYTE ESTERASE UR QL STRIP.AUTO: ABNORMAL
LIPASE SERPL-CCNC: 26 U/L (ref 13–75)
LITHIUM SERPL-SCNC: 0.9 MMOL/L (ref 0.6–1.2)
LYMPHOCYTES # BLD: 0.33 K/UL (ref 0.8–3.5)
LYMPHOCYTES NFR BLD: 3.5 % (ref 12–49)
MCH RBC QN AUTO: 27.6 PG (ref 26–34)
MCHC RBC AUTO-ENTMCNC: 31.6 G/DL (ref 30–36.5)
MCV RBC AUTO: 87.2 FL (ref 80–99)
MONOCYTES # BLD: 0.34 K/UL (ref 0–1)
MONOCYTES NFR BLD: 3.6 % (ref 5–13)
NEUTS SEG # BLD: 8.68 K/UL (ref 1.8–8)
NEUTS SEG NFR BLD: 92 % (ref 32–75)
NITRITE UR QL STRIP.AUTO: NEGATIVE
NRBC # BLD: 0 K/UL (ref 0–0.01)
NRBC BLD-RTO: 0 PER 100 WBC
PH UR STRIP: 6.5 (ref 5–8)
PLATELET # BLD AUTO: 384 K/UL (ref 150–400)
PMV BLD AUTO: 8.1 FL (ref 8.9–12.9)
POTASSIUM SERPL-SCNC: 4.3 MMOL/L (ref 3.5–5.1)
PROT SERPL-MCNC: 7.3 G/DL (ref 6.4–8.2)
PROT UR STRIP-MCNC: NEGATIVE MG/DL
RBC # BLD AUTO: 4.53 M/UL (ref 4.1–5.7)
RBC #/AREA URNS HPF: ABNORMAL /HPF (ref 0–5)
SODIUM SERPL-SCNC: 136 MMOL/L (ref 136–145)
SP GR UR REFRACTOMETRY: 1.01
TROPONIN I SERPL HS-MCNC: <4 NG/L (ref 0–76)
UROBILINOGEN UR QL STRIP.AUTO: 1 EU/DL (ref 0.2–1)
WBC # BLD AUTO: 9.4 K/UL (ref 4.1–11.1)
WBC URNS QL MICRO: ABNORMAL /HPF (ref 0–4)

## 2025-03-09 PROCEDURE — 96374 THER/PROPH/DIAG INJ IV PUSH: CPT

## 2025-03-09 PROCEDURE — 84484 ASSAY OF TROPONIN QUANT: CPT

## 2025-03-09 PROCEDURE — 6360000002 HC RX W HCPCS: Performed by: EMERGENCY MEDICINE

## 2025-03-09 PROCEDURE — 81001 URINALYSIS AUTO W/SCOPE: CPT

## 2025-03-09 PROCEDURE — 80178 ASSAY OF LITHIUM: CPT

## 2025-03-09 PROCEDURE — 36415 COLL VENOUS BLD VENIPUNCTURE: CPT

## 2025-03-09 PROCEDURE — 96361 HYDRATE IV INFUSION ADD-ON: CPT

## 2025-03-09 PROCEDURE — 99284 EMERGENCY DEPT VISIT MOD MDM: CPT

## 2025-03-09 PROCEDURE — 85025 COMPLETE CBC W/AUTO DIFF WBC: CPT

## 2025-03-09 PROCEDURE — 2580000003 HC RX 258: Performed by: EMERGENCY MEDICINE

## 2025-03-09 PROCEDURE — 93005 ELECTROCARDIOGRAM TRACING: CPT | Performed by: EMERGENCY MEDICINE

## 2025-03-09 PROCEDURE — 80053 COMPREHEN METABOLIC PANEL: CPT

## 2025-03-09 PROCEDURE — 83690 ASSAY OF LIPASE: CPT

## 2025-03-09 RX ORDER — LORAZEPAM 1 MG/1
1 TABLET ORAL
Status: DISCONTINUED | OUTPATIENT
Start: 2025-03-09 | End: 2025-03-09

## 2025-03-09 RX ORDER — ONDANSETRON 4 MG/1
4 TABLET, ORALLY DISINTEGRATING ORAL 3 TIMES DAILY PRN
Qty: 21 TABLET | Refills: 0 | Status: SHIPPED | OUTPATIENT
Start: 2025-03-09

## 2025-03-09 RX ORDER — LORAZEPAM 1 MG/1
4 TABLET ORAL
Status: DISCONTINUED | OUTPATIENT
Start: 2025-03-09 | End: 2025-03-09

## 2025-03-09 RX ORDER — ONDANSETRON 2 MG/ML
4 INJECTION INTRAMUSCULAR; INTRAVENOUS EVERY 6 HOURS PRN
Status: DISCONTINUED | OUTPATIENT
Start: 2025-03-09 | End: 2025-03-09 | Stop reason: HOSPADM

## 2025-03-09 RX ORDER — LORAZEPAM 2 MG/ML
1 INJECTION INTRAMUSCULAR
Status: DISCONTINUED | OUTPATIENT
Start: 2025-03-09 | End: 2025-03-09

## 2025-03-09 RX ORDER — LORAZEPAM 2 MG/ML
3 INJECTION INTRAMUSCULAR
Status: DISCONTINUED | OUTPATIENT
Start: 2025-03-09 | End: 2025-03-09

## 2025-03-09 RX ORDER — LORAZEPAM 1 MG/1
3 TABLET ORAL
Status: DISCONTINUED | OUTPATIENT
Start: 2025-03-09 | End: 2025-03-09

## 2025-03-09 RX ORDER — LORAZEPAM 1 MG/1
2 TABLET ORAL
Status: DISCONTINUED | OUTPATIENT
Start: 2025-03-09 | End: 2025-03-09

## 2025-03-09 RX ORDER — LORAZEPAM 2 MG/ML
2 INJECTION INTRAMUSCULAR
Status: DISCONTINUED | OUTPATIENT
Start: 2025-03-09 | End: 2025-03-09

## 2025-03-09 RX ORDER — 0.9 % SODIUM CHLORIDE 0.9 %
1000 INTRAVENOUS SOLUTION INTRAVENOUS ONCE
Status: COMPLETED | OUTPATIENT
Start: 2025-03-09 | End: 2025-03-09

## 2025-03-09 RX ORDER — LORAZEPAM 2 MG/ML
4 INJECTION INTRAMUSCULAR
Status: DISCONTINUED | OUTPATIENT
Start: 2025-03-09 | End: 2025-03-09

## 2025-03-09 RX ADMIN — ONDANSETRON 4 MG: 2 INJECTION, SOLUTION INTRAMUSCULAR; INTRAVENOUS at 11:29

## 2025-03-09 RX ADMIN — SODIUM CHLORIDE 1000 ML: 0.9 INJECTION, SOLUTION INTRAVENOUS at 11:29

## 2025-03-09 ASSESSMENT — PAIN - FUNCTIONAL ASSESSMENT: PAIN_FUNCTIONAL_ASSESSMENT: NONE - DENIES PAIN

## 2025-03-09 NOTE — ED PROVIDER NOTES
RADIOLOGY:  Non-plain film images such as CT, Ultrasound and MRI are read by the radiologist. Plain radiographic images are visualized and preliminarily interpreted by the ED Provider with the findings documented in the MDM section.     Interpretation per the Radiologist below, if available at the time of this note:     No orders to display          PROCEDURES   Unless otherwise noted below, none  Procedures       EMERGENCY DEPARTMENT COURSE and DIFFERENTIAL DIAGNOSIS/MDM   Vitals:    Vitals:    03/09/25 1054 03/09/25 1326   BP: 98/81 113/79   Pulse: 98    Resp: 16    Temp: 97.5 °F (36.4 °C)    TempSrc: Tympanic    SpO2: 95%    Weight: 77.1 kg (170 lb)    Height: 1.727 m (5' 8\")         Patient was given the following medications:  Medications   ondansetron (ZOFRAN) injection 4 mg (4 mg IntraVENous Given 3/9/25 1129)   sodium chloride 0.9 % bolus 1,000 mL (0 mLs IntraVENous Stopped 3/9/25 1329)       Medical Decision Making  Known exposure to gastroenteritis presents with vomiting however no diarrhea.  He does have hyperactive bowel sounds that is why most suspicious for a viral syndrome.  Given generalized weakness, vomiting, \"exhaustion,\" will check EKG and troponin to rule out atypical presentation of ACS.  Will rule out dehydration/electrolyte normality/JUDIE.    Amount and/or Complexity of Data Reviewed  Labs: ordered.  ECG/medicine tests: ordered.    Risk  Prescription drug management.        **PLEASE SEE ED COURSE BELOW FOR FURTHER MDM DETAILS:    ED Course as of 03/09/25 1457   Sun Mar 09, 2025   1215 EKG done at 11:25 AM: Normal sinus rhythm, 82, normal axis, normal intervals, ectopy, nonspecific ST segment change without obvious ischemia.  Independently interpreted by me [SS]      ED Course User Index  [SS] Katie Ballard MD         FINAL IMPRESSION     1. Nausea and vomiting, unspecified vomiting type    2. Viral illness          DISPOSITION/PLAN   Janes Frias's  results have been reviewed with him.

## 2025-03-09 NOTE — ED NOTES
Patient brought here by EMS with c/o vomiting.  Patient states that he lives at an assisted living facility, states that he had 6 other residents who were sick with similar symptoms, patient states that someone brought them food yesterday and after eating a meatball sub he began to feel sick.  Patient denies fevers, denies diarrhea.  Patient given a can of ginger ale by provider, tolerating PO at this time.        Emergency Department Nursing Plan of Care       The Nursing Plan of Care is developed from the Nursing assessment and Emergency Department Attending provider initial evaluation.  The plan of care may be reviewed in the “ED Provider note”.      The Plan of Care was developed with the following considerations:  Patient / Family readiness to learn indicated by:verbalized understanding  Persons(s) to be included in education: patient  Barriers to Learning/Limitations:None      Signed     Gracia Alex RN    3/9/2025   11:16 AM

## 2025-03-09 NOTE — ED TRIAGE NOTES
Patient presents to ED via EMS. Patient reports n/v onset last night. Denies diarrhea, abdominal pain or fevers. Patient lives in an assisted living facility and reports \"a lot of stuff is going around\". Did not take any medications at home

## 2025-03-09 NOTE — ED NOTES
Patient given copy of dc instructions and 1 script(s). Patient verbalized their understanding of instructions and script(s). Patient given a current medication reconciliation form and verbalized understanding of their medications. Patient verbalized understanding of the importance of discussing medications with his or her physician or clinic they will be following up with. Patient alert and oriented and in no acute distress. Patient discharged from the ER, patient offered a wheelchair, and when offered patient declines wheelchair.

## 2025-03-10 LAB
EKG ATRIAL RATE: 82 BPM
EKG DIAGNOSIS: NORMAL
EKG P AXIS: 62 DEGREES
EKG P-R INTERVAL: 154 MS
EKG Q-T INTERVAL: 368 MS
EKG QRS DURATION: 96 MS
EKG QTC CALCULATION (BAZETT): 429 MS
EKG R AXIS: 84 DEGREES
EKG T AXIS: 45 DEGREES
EKG VENTRICULAR RATE: 82 BPM

## 2025-03-10 PROCEDURE — 93010 ELECTROCARDIOGRAM REPORT: CPT | Performed by: SPECIALIST

## 2025-03-21 ENCOUNTER — HOSPITAL ENCOUNTER (EMERGENCY)
Facility: HOSPITAL | Age: 65
Discharge: HOME | End: 2025-03-21
Attending: STUDENT IN AN ORGANIZED HEALTH CARE EDUCATION/TRAINING PROGRAM
Payer: MEDICARE

## 2025-03-21 VITALS
SYSTOLIC BLOOD PRESSURE: 120 MMHG | RESPIRATION RATE: 16 BRPM | HEART RATE: 88 BPM | HEIGHT: 67 IN | DIASTOLIC BLOOD PRESSURE: 74 MMHG | TEMPERATURE: 98.4 F | BODY MASS INDEX: 25.67 KG/M2 | OXYGEN SATURATION: 99 % | WEIGHT: 163.58 LBS

## 2025-03-21 DIAGNOSIS — Z59.00 HOMELESS: ICD-10-CM

## 2025-03-21 DIAGNOSIS — R06.89 DYSPNEA AND RESPIRATORY ABNORMALITIES: Primary | ICD-10-CM

## 2025-03-21 DIAGNOSIS — Z91.148 HISTORY OF MEDICATION NONCOMPLIANCE: ICD-10-CM

## 2025-03-21 DIAGNOSIS — R06.00 DYSPNEA AND RESPIRATORY ABNORMALITIES: Primary | ICD-10-CM

## 2025-03-21 PROCEDURE — 99283 EMERGENCY DEPT VISIT LOW MDM: CPT

## 2025-03-21 PROCEDURE — 6370000000 HC RX 637 (ALT 250 FOR IP): Performed by: STUDENT IN AN ORGANIZED HEALTH CARE EDUCATION/TRAINING PROGRAM

## 2025-03-21 RX ORDER — LITHIUM CARBONATE 300 MG
600 TABLET ORAL ONCE
Status: COMPLETED | OUTPATIENT
Start: 2025-03-21 | End: 2025-03-21

## 2025-03-21 RX ORDER — LITHIUM CARBONATE 300 MG/1
600 TABLET, FILM COATED, EXTENDED RELEASE ORAL ONCE
Status: DISCONTINUED | OUTPATIENT
Start: 2025-03-21 | End: 2025-03-21

## 2025-03-21 RX ORDER — RISPERIDONE 1 MG/1
3 TABLET ORAL ONCE
Status: COMPLETED | OUTPATIENT
Start: 2025-03-21 | End: 2025-03-21

## 2025-03-21 RX ORDER — QUETIAPINE 300 MG/1
300 TABLET, FILM COATED, EXTENDED RELEASE ORAL ONCE
Status: COMPLETED | OUTPATIENT
Start: 2025-03-21 | End: 2025-03-21

## 2025-03-21 RX ORDER — LEVOTHYROXINE SODIUM 50 UG/1
50 TABLET ORAL ONCE
Status: COMPLETED | OUTPATIENT
Start: 2025-03-21 | End: 2025-03-21

## 2025-03-21 RX ORDER — PANTOPRAZOLE SODIUM 40 MG/1
40 TABLET, DELAYED RELEASE ORAL ONCE
Status: COMPLETED | OUTPATIENT
Start: 2025-03-21 | End: 2025-03-21

## 2025-03-21 RX ORDER — FAMOTIDINE 20 MG/1
20 TABLET, FILM COATED ORAL ONCE
Status: DISCONTINUED | OUTPATIENT
Start: 2025-03-21 | End: 2025-03-21

## 2025-03-21 RX ADMIN — PANTOPRAZOLE SODIUM 40 MG: 40 TABLET, DELAYED RELEASE ORAL at 21:16

## 2025-03-21 RX ADMIN — QUETIAPINE FUMARATE 300 MG: 300 TABLET, EXTENDED RELEASE ORAL at 21:15

## 2025-03-21 RX ADMIN — RISPERIDONE 3 MG: 1 TABLET, FILM COATED ORAL at 21:16

## 2025-03-21 RX ADMIN — LEVOTHYROXINE SODIUM 50 MCG: 0.05 TABLET ORAL at 21:16

## 2025-03-21 RX ADMIN — LITHIUM CARBONATE 600 MG: 300 TABLET ORAL at 21:15

## 2025-03-21 SDOH — ECONOMIC STABILITY - HOUSING INSECURITY: HOMELESSNESS UNSPECIFIED: Z59.00

## 2025-03-22 ENCOUNTER — HOSPITAL ENCOUNTER (EMERGENCY)
Facility: HOSPITAL | Age: 65
Discharge: PSYCHIATRIC OR SUBSTANCE USE FACILITY | End: 2025-03-22
Attending: STUDENT IN AN ORGANIZED HEALTH CARE EDUCATION/TRAINING PROGRAM
Payer: MEDICARE

## 2025-03-22 ENCOUNTER — HOSPITAL ENCOUNTER (INPATIENT)
Facility: HOSPITAL | Age: 65
LOS: 3 days | Discharge: HOME | DRG: 885 | End: 2025-03-25
Attending: PSYCHIATRY & NEUROLOGY | Admitting: PSYCHIATRY & NEUROLOGY
Payer: MEDICARE

## 2025-03-22 ENCOUNTER — APPOINTMENT (OUTPATIENT)
Facility: HOSPITAL | Age: 65
End: 2025-03-22
Payer: MEDICARE

## 2025-03-22 VITALS
RESPIRATION RATE: 16 BRPM | TEMPERATURE: 98.2 F | HEART RATE: 88 BPM | WEIGHT: 163 LBS | BODY MASS INDEX: 25.53 KG/M2 | SYSTOLIC BLOOD PRESSURE: 116 MMHG | DIASTOLIC BLOOD PRESSURE: 80 MMHG | OXYGEN SATURATION: 99 %

## 2025-03-22 DIAGNOSIS — F31.30 BIPOLAR AFFECTIVE DISORDER, CURRENT EPISODE DEPRESSED, CURRENT EPISODE SEVERITY UNSPECIFIED (HCC): ICD-10-CM

## 2025-03-22 DIAGNOSIS — R45.851 SUICIDAL IDEATIONS: Primary | ICD-10-CM

## 2025-03-22 LAB
ALBUMIN SERPL-MCNC: 3.8 G/DL (ref 3.5–5)
ALBUMIN/GLOB SERPL: 1.1 (ref 1.1–2.2)
ALP SERPL-CCNC: 115 U/L (ref 45–117)
ALT SERPL-CCNC: 21 U/L (ref 12–78)
AMPHET UR QL SCN: NEGATIVE
ANION GAP SERPL CALC-SCNC: 8 MMOL/L (ref 2–12)
AST SERPL-CCNC: 17 U/L (ref 15–37)
BARBITURATES UR QL SCN: NEGATIVE
BASOPHILS # BLD: 0.06 K/UL (ref 0–0.1)
BASOPHILS NFR BLD: 0.6 % (ref 0–1)
BENZODIAZ UR QL: NEGATIVE
BILIRUB SERPL-MCNC: 0.6 MG/DL (ref 0.2–1)
BUN SERPL-MCNC: 11 MG/DL (ref 6–20)
BUN/CREAT SERPL: 9 (ref 12–20)
CALCIUM SERPL-MCNC: 9.1 MG/DL (ref 8.5–10.1)
CANNABINOIDS UR QL SCN: POSITIVE
CHLORIDE SERPL-SCNC: 104 MMOL/L (ref 97–108)
CO2 SERPL-SCNC: 27 MMOL/L (ref 21–32)
COCAINE UR QL SCN: NEGATIVE
CREAT SERPL-MCNC: 1.16 MG/DL (ref 0.7–1.3)
DIFFERENTIAL METHOD BLD: ABNORMAL
EKG ATRIAL RATE: 76 BPM
EKG DIAGNOSIS: NORMAL
EKG P AXIS: 64 DEGREES
EKG P-R INTERVAL: 156 MS
EKG Q-T INTERVAL: 398 MS
EKG QRS DURATION: 92 MS
EKG QTC CALCULATION (BAZETT): 447 MS
EKG R AXIS: 80 DEGREES
EKG T AXIS: 65 DEGREES
EKG VENTRICULAR RATE: 76 BPM
EOSINOPHIL # BLD: 0.15 K/UL (ref 0–0.4)
EOSINOPHIL NFR BLD: 1.5 % (ref 0–7)
ERYTHROCYTE [DISTWIDTH] IN BLOOD BY AUTOMATED COUNT: 14.2 % (ref 11.5–14.5)
ETHANOL SERPL-MCNC: <10 MG/DL (ref 0–0.08)
GLOBULIN SER CALC-MCNC: 3.4 G/DL (ref 2–4)
GLUCOSE SERPL-MCNC: 94 MG/DL (ref 65–100)
HCT VFR BLD AUTO: 35.3 % (ref 36.6–50.3)
HGB BLD-MCNC: 11.3 G/DL (ref 12.1–17)
IMM GRANULOCYTES # BLD AUTO: 0.03 K/UL (ref 0–0.04)
IMM GRANULOCYTES NFR BLD AUTO: 0.3 % (ref 0–0.5)
LYMPHOCYTES # BLD: 1.67 K/UL (ref 0.8–3.5)
LYMPHOCYTES NFR BLD: 17.1 % (ref 12–49)
Lab: ABNORMAL
MCH RBC QN AUTO: 27.8 PG (ref 26–34)
MCHC RBC AUTO-ENTMCNC: 32 G/DL (ref 30–36.5)
MCV RBC AUTO: 86.9 FL (ref 80–99)
METHADONE UR QL: NEGATIVE
MONOCYTES # BLD: 0.58 K/UL (ref 0–1)
MONOCYTES NFR BLD: 5.9 % (ref 5–13)
NEUTS SEG # BLD: 7.28 K/UL (ref 1.8–8)
NEUTS SEG NFR BLD: 74.6 % (ref 32–75)
NRBC # BLD: 0 K/UL (ref 0–0.01)
NRBC BLD-RTO: 0 PER 100 WBC
OPIATES UR QL: NEGATIVE
PCP UR QL: NEGATIVE
PLATELET # BLD AUTO: 455 K/UL (ref 150–400)
PMV BLD AUTO: 7.9 FL (ref 8.9–12.9)
POTASSIUM SERPL-SCNC: 4.3 MMOL/L (ref 3.5–5.1)
PROT SERPL-MCNC: 7.2 G/DL (ref 6.4–8.2)
RBC # BLD AUTO: 4.06 M/UL (ref 4.1–5.7)
SODIUM SERPL-SCNC: 139 MMOL/L (ref 136–145)
TROPONIN I SERPL HS-MCNC: 5 NG/L (ref 0–76)
WBC # BLD AUTO: 9.8 K/UL (ref 4.1–11.1)

## 2025-03-22 PROCEDURE — 84484 ASSAY OF TROPONIN QUANT: CPT

## 2025-03-22 PROCEDURE — 82077 ASSAY SPEC XCP UR&BREATH IA: CPT

## 2025-03-22 PROCEDURE — 80053 COMPREHEN METABOLIC PANEL: CPT

## 2025-03-22 PROCEDURE — 80307 DRUG TEST PRSMV CHEM ANLYZR: CPT

## 2025-03-22 PROCEDURE — 85025 COMPLETE CBC W/AUTO DIFF WBC: CPT

## 2025-03-22 PROCEDURE — 71045 X-RAY EXAM CHEST 1 VIEW: CPT

## 2025-03-22 PROCEDURE — 1240000000 HC EMOTIONAL WELLNESS R&B

## 2025-03-22 PROCEDURE — 36415 COLL VENOUS BLD VENIPUNCTURE: CPT

## 2025-03-22 PROCEDURE — 99285 EMERGENCY DEPT VISIT HI MDM: CPT

## 2025-03-22 PROCEDURE — 93005 ELECTROCARDIOGRAM TRACING: CPT | Performed by: STUDENT IN AN ORGANIZED HEALTH CARE EDUCATION/TRAINING PROGRAM

## 2025-03-22 ASSESSMENT — PAIN - FUNCTIONAL ASSESSMENT: PAIN_FUNCTIONAL_ASSESSMENT: NONE - DENIES PAIN

## 2025-03-22 NOTE — ED TRIAGE NOTES
Patient brought by EMS for c/o shortness of breath.  Patient reports he left the psychiatric program, [facility], early on Wednesday.  He has not had his medication since Wednesday and has not slept since he left the program. Patient was seen here last night for same complaint. Respirations noted to be even and unlabored in triage.

## 2025-03-22 NOTE — ED NOTES
Pt presents to ED via walk in complaining of SOB, anxiety and being out of psych meds for about 4 days. Pt is alert and oriented x 4, RR even and unlabored, skin is warm and dry. Pt appears in NAD at this time. Assessment completed and pt updated on plan of care.  Call bell in reach.   Emergency Department Nursing Plan of Care  The Nursing Plan of Care is developed from the Nursing assessment and Emergency Department Attending provider initial evaluation.  The plan of care may be reviewed in the “ED Provider note”.  The Plan of Care was developed with the following considerations:  Patient / Family readiness to learn indicated by:Refer to Medical chart in [facility]  Persons(s) to be included in education: Refer to Medical chart in [facility]  Barriers to Learning/Limitations:Normal

## 2025-03-22 NOTE — ED TRIAGE NOTES
Pt presents ambulatory to ED with CC SOB for 5 mins, denies hx COPD/asthma  Pt reports he signed himself out of housing facility yesterday and needs medication he was receiving. Pt reports taking Risperdal, Lithium, and Seroquel  Pt is homeless at this time.

## 2025-03-22 NOTE — ED PROVIDER NOTES
[facility] EMERGENCY DEPARTMENT  EMERGENCY DEPARTMENT ENCOUNTER       Pt Name: Janes Frias  MRN: [id number]  Birthdate 1960  Date of evaluation: 3/22/2025  Provider: Janes Valerio MD   PCP: None, None  Note Started: 5:36 PM EDT 3/22/25     CHIEF COMPLAINT       Chief Complaint   Patient presents with    Shortness of Breath    Psychiatric Evaluation     HISTORY OF PRESENT ILLNESS: 1 or more elements      History From: patient, History limited by: none     Janes Frias is a 65 y.o. male with history of type I bipolar disorder who presented to the emergency department with transient episodes of shortness of breath.  He reports that shortness of breath is intermittent and lasts for seconds.  This has occurred three times.  He currently denies symptoms.  He reports suicidal ideations, but does not have a plan.  He denies homicidal ideations.  He denies hallucinations.  He is homeless.    Please See MDM for Additional Details of the HPI/PMH  Nursing Notes were all reviewed and agreed with or any disagreements were addressed in the HPI.     REVIEW OF SYSTEMS        Positives and Pertinent negatives as per HPI.    PAST HISTORY     Past Medical History:  Past Medical History:   Diagnosis Date    Bipolar 1 disorder (HCC)     Depression     Vanessa (HCC)     Mood disorder     Psychotic disorder (HCC)     Substance abuse (HCC)        Past Surgical History:  Past Surgical History:   Procedure Laterality Date    DC UNLISTED PROCEDURE ABDOMEN PERITONEUM & OMENTUM      hernia       Family History:  No family history on file.    Social History:  Social History     Tobacco Use    Smoking status: Every Day     Current packs/day: 0.50     Types: Cigarettes    Smokeless tobacco: Never   Substance Use Topics    Alcohol use: No    Drug use: Not Currently     Types: Marijuana (Weed), Cocaine, Prescription     Comment: Smokes Marijuana       Allergies:  Allergies   Allergen Reactions    Bee Venom Swelling     ED Course.  Radiology: ordered.     Details: No abnormality per radiology  ECG/medicine tests: ordered and independent interpretation performed. Decision-making details documented in ED Course.  Discussion of management or test interpretation with external provider(s): See ED course    Risk  Decision regarding hospitalization.      ED Course as of 03/22/25 1736   Sat Mar 22, 2025   1640 EKG as interpreted by me with sinus rhythm, heart rate 76, normal axis, normal intervals, no ST changes [WB]   1716 Serum labs unremarkable.  Troponin negative.  No significant anemia.    Yudy with BSMART recommended behavioral health admission.  Patient medically cleared at this time. [WB]      ED Course User Index  [WB] Janes Valerio MD       FINAL IMPRESSION     1. Suicidal ideations    2. Bipolar affective disorder, current episode depressed, current episode severity unspecified (HCC)       DISPOSITION/PLAN     CLINICAL IMPRESSION    Behavioral health admission    I am the Primary Clinician of Record.   Janes Valerio MD (electronically signed)    (Please note that parts of this dictation were completed with voice recognition software. Quite often unanticipated grammatical, syntax, homophones, and other interpretive errors are inadvertently transcribed by the computer software. Please disregards these errors. Please excuse any errors that have escaped final proofreading.)          Janes Valerio MD  03/22/25 2936

## 2025-03-22 NOTE — ED PROVIDER NOTES
[facility] EMERGENCY DEPARTMENT  EMERGENCY DEPARTMENT ENCOUNTER       Pt Name: Janes Frias  MRN: [id number]  Birthdate 1960  Date of evaluation: 3/21/2025  Provider: Spencer Gonzales MD   PCP: None, None  Note Started: 8:50 PM EDT 3/21/25     CHIEF COMPLAINT       Chief Complaint   Patient presents with    Homeless     Arrived via [facility]        HISTORY OF PRESENT ILLNESS: 1 or more elements      History From: patient, History limited by: none     Janes Frias is a 65 y.o. male presenting with SOB.  This happened earlier when he was walking down the street.  It lasted for five minutes and went away.  He has no complaints at the moment.  He still wanted to get checked out so EMS transported him to ED.  He notes he signed himself out of a housing group yesterday - hasn't taken his medications in 3 days.  He notes regret about leaving.  Denies SI, HI, AH, VH.         Please See MDM for Additional Details of the HPI/PMH  Nursing Notes were all reviewed and agreed with or any disagreements were addressed in the HPI.     REVIEW OF SYSTEMS        Positives and Pertinent negatives as per HPI.    PAST HISTORY     Past Medical History:  Past Medical History:   Diagnosis Date    Bipolar 1 disorder (HCC)     Depression     Vanessa (HCC)     Mood disorder     Psychotic disorder (HCC)     Substance abuse (HCC)        Past Surgical History:  Past Surgical History:   Procedure Laterality Date    ID UNLISTED PROCEDURE ABDOMEN PERITONEUM & OMENTUM      hernia       Family History:  No family history on file.    Social History:  Social History     Tobacco Use    Smoking status: Every Day     Current packs/day: 0.50     Types: Cigarettes    Smokeless tobacco: Never   Substance Use Topics    Alcohol use: No    Drug use: Not Currently     Types: Marijuana (Weed), Cocaine, Prescription     Comment: Smokes Marijuana       Allergies:  Allergies   Allergen Reactions    Bee Venom Swelling    Risperidone Er Other (See  Comments)     Allergy to Uzedy   Jerking and akathisia       CURRENT MEDICATIONS      Previous Medications    ACETAMINOPHEN (TYLENOL) 325 MG TABLET    Take 2 tablets by mouth every 6 hours as needed for Pain    CHOLECALCIFEROL (VITAMIN D3) 50 MCG (2000 UT) TABS    Take 1 tablet by mouth daily    FAMOTIDINE (PEPCID) 20 MG TABLET    Take 1 tablet by mouth 2 times daily    GNP VITAMIN C 500 MG TABLET    Take 2 tablets by mouth daily    LEVOTHYROXINE (SYNTHROID) 75 MCG TABLET    Take 1 tablet by mouth daily    LITHIUM (LITHOBID) 300 MG EXTENDED RELEASE TABLET    Take 2 tablets by mouth at bedtime    NAPROXEN (NAPROSYN) 500 MG TABLET    Take 1 tablet by mouth daily as needed for Pain    ONDANSETRON (ZOFRAN-ODT) 4 MG DISINTEGRATING TABLET    Take 1 tablet by mouth 3 times daily as needed for Nausea or Vomiting    QUETIAPINE (SEROQUEL) 300 MG TABLET    Take 1 tablet by mouth nightly    RISPERIDONE ER (UZEDY) SUBCUTANEOUS INJECTION    Inject 0.35 mLs into the skin every 30 days       SCREENINGS               No data recorded            PHYSICAL EXAM      ED Triage Vitals [03/21/25 2018]   Encounter Vitals Group      /84      Systolic BP Percentile       Diastolic BP Percentile       Pulse 77      Respirations 18      Temp 98.4 °F (36.9 °C)      Temp Source Oral      SpO2 97 %      Weight - Scale 74.2 kg (163 lb 9.3 oz)      Height 1.702 m (5' 7\")      Head Circumference       Peak Flow       Pain Score       Pain Loc       Pain Education       Exclude from Growth Chart         Physical Exam  Vitals and nursing note reviewed.   Constitutional:       Appearance: Normal appearance.   HENT:      Head: Normocephalic.   Eyes:      Extraocular Movements: Extraocular movements intact.      Pupils: Pupils are equal, round, and reactive to light.   Cardiovascular:      Rate and Rhythm: Normal rate.   Pulmonary:      Effort: Pulmonary effort is normal. No respiratory distress.      Breath sounds: Normal breath sounds. No

## 2025-03-23 PROCEDURE — 1240000000 HC EMOTIONAL WELLNESS R&B

## 2025-03-23 PROCEDURE — 6370000000 HC RX 637 (ALT 250 FOR IP)

## 2025-03-23 RX ORDER — ACETAMINOPHEN 325 MG/1
650 TABLET ORAL EVERY 4 HOURS PRN
Status: DISCONTINUED | OUTPATIENT
Start: 2025-03-23 | End: 2025-03-25 | Stop reason: HOSPADM

## 2025-03-23 RX ORDER — LITHIUM CARBONATE 300 MG/1
600 TABLET, FILM COATED, EXTENDED RELEASE ORAL NIGHTLY
Status: DISCONTINUED | OUTPATIENT
Start: 2025-03-23 | End: 2025-03-25 | Stop reason: HOSPADM

## 2025-03-23 RX ORDER — HYDROXYZINE HYDROCHLORIDE 50 MG/1
50 TABLET, FILM COATED ORAL 3 TIMES DAILY PRN
Status: DISCONTINUED | OUTPATIENT
Start: 2025-03-23 | End: 2025-03-25 | Stop reason: HOSPADM

## 2025-03-23 RX ORDER — RISPERIDONE 3 MG/1
3 TABLET ORAL 2 TIMES DAILY
Status: ON HOLD | COMMUNITY
End: 2025-03-25 | Stop reason: HOSPADM

## 2025-03-23 RX ORDER — TRAZODONE HYDROCHLORIDE 50 MG/1
50 TABLET ORAL NIGHTLY PRN
Status: DISCONTINUED | OUTPATIENT
Start: 2025-03-23 | End: 2025-03-25 | Stop reason: HOSPADM

## 2025-03-23 RX ORDER — MAGNESIUM HYDROXIDE/ALUMINUM HYDROXICE/SIMETHICONE 120; 1200; 1200 MG/30ML; MG/30ML; MG/30ML
30 SUSPENSION ORAL EVERY 6 HOURS PRN
Status: DISCONTINUED | OUTPATIENT
Start: 2025-03-23 | End: 2025-03-25 | Stop reason: HOSPADM

## 2025-03-23 RX ORDER — QUETIAPINE FUMARATE 300 MG/1
300 TABLET, FILM COATED ORAL
Status: DISCONTINUED | OUTPATIENT
Start: 2025-03-23 | End: 2025-03-25 | Stop reason: HOSPADM

## 2025-03-23 RX ADMIN — QUETIAPINE FUMARATE 300 MG: 300 TABLET ORAL at 00:54

## 2025-03-23 RX ADMIN — QUETIAPINE FUMARATE 300 MG: 300 TABLET ORAL at 20:16

## 2025-03-23 RX ADMIN — LITHIUM CARBONATE 600 MG: 300 TABLET, EXTENDED RELEASE ORAL at 00:54

## 2025-03-23 RX ADMIN — LITHIUM CARBONATE 600 MG: 300 TABLET, EXTENDED RELEASE ORAL at 20:16

## 2025-03-23 ASSESSMENT — SLEEP AND FATIGUE QUESTIONNAIRES
DO YOU USE A SLEEP AID: YES
SLEEP PATTERN: INSOMNIA
DO YOU HAVE DIFFICULTY SLEEPING: YES
AVERAGE NUMBER OF SLEEP HOURS: 7

## 2025-03-23 ASSESSMENT — PAIN - FUNCTIONAL ASSESSMENT: PAIN_FUNCTIONAL_ASSESSMENT: NONE - DENIES PAIN

## 2025-03-23 ASSESSMENT — PAIN SCALES - GENERAL: PAINLEVEL_OUTOF10: 0

## 2025-03-23 NOTE — BH NOTE
Admission Note:  65 y.o. male admitted with depression and medication stabilization.  He recently left Assisted Living facility and is now homeless.  He reports that he has been off his medication for a couple of days.  Pt states that he has been sleeping [place] [employer] parking [place].  He reports that he has not been sleeping for several days because he has been off of his medications.  He smokes cigarettes and occasional THC.  He is currently not working and is on disability.     Arrived from [address] at 2255.  His admission was completed in Group room.  He was initially irritable and easily agitated but was apologetic shortly after.  Patient was focused on receiving his medications and sleeping. Pt denies SI/HI and states that he has never been suicidal in his life.      Legal Status:  Voluntary    Psychiatrist :  Dr Gill    H&P:  Dr Nassar    Labs:  +THC    VS:  97.7, 81, 18, 115/83, 96% on RA    Mental Status:  Alert and Oriented x4, initially irritable and uncooperative but apologetic after a short period of time.  Reports poor sleep to no sleep since Thursday.  Good eye contact, memory appears intact.  Denies any alcohol or recreational drugs except for THC.  Currently homeless after leaving Assisted Living program in [place].  Denies SI/HI or A/V/H.      BH Hx:  Bipolar Disorder     Medical Hx:  Hernia, Hypothyroid, BPH    Sleep:  Pt reports decreased sleep since he has been off his medications.  He states that he sleeps \"fine\" when on his medications.     Legal Hx:  In assisted last year for assaulting Police officers.  Reports charges were dropped to misdemeanor charge with time served.      Education:  GED with some college classes.     Employment:  Not currently working-on disability     Reason to Live:  \"I love life and I have kids and grandchildren\".     CQ analyst completed and put in computer.     Safety security Check completed by two staff members, personal items inventoried.  Patient given

## 2025-03-23 NOTE — CARE COORDINATION
03/23/25 1216   ITP   Date of Plan 03/23/25   Date of Next Review 03/30/25   Primary Diagnosis Code Bipolar Disorder   Strengths Incorporated in Plan Seeking interactions   Plan of Care   Long Term Goal (LTG) Stated in patient/guardian terms Medication management   Short Term Goal 1   Short Term Goal 1 Client will maintain compliance with medication regime   Baseline Functioning Client reports not having medications   Target Client will be able to access, feel supported by, and comply with medication regimen   Objectives Other (comment)   Intervention  Monitor medications   Frequency Daily   Measured by Staff observation;Self report   Staff Responsible [facility] staff;Clinical staff   Intervention 2 Assess safety   Frequency Daily   Measured by Behavioral data;Self report;Staff observation   Staff Responsible [facility] staff;Clinical staff   Intervention 3 Acknowledge client strengths   Frequency Daily   Measured by Behavioral data;Self report;Staff observation   Staff Responsible [facility] staff;Clinical staff   STG Goal 1 Status: Patient Appears to be  Progressing toward treatment plan goal   Short Term Goal 2   Short Term Goal 2 Client will learn and demonstrate improved self management skills   Baseline Functioning Client homeless, multiple hospitalizations in the past few months   Target Client will maintain with supports in community   Objectives Other (comment)   Intervention  Referral to community services   Frequency Prior to discharge   Measured by Staff observation;Self report   Staff Responsible [facility] staff;Clinical staff   Intervention 2 Group therapy   Frequency Daily   Measured by Staff observation;Self report   Staff Responsible [facility] staff;Clinical staff   Intervention 3 Milieu therapy and support   Frequency Daily   Measured by Staff observation;Self report   Staff Responsible [facility] staff;Clinical staff   STG Goal 2 Status: Patient Appears to be  Progressing toward treatment plan goal   Crisis/Safety/Discharge Plan

## 2025-03-23 NOTE — BH NOTE
TRANSFER - IN REPORT:    Verbal report received from HONEY Mendez on Janes Frias being received from [facility] for routine progression of patient care      Report consisted of patient's Situation, Background, Assessment and   Recommendations(SBAR).     Information from the following report(s) ED SBAR was reviewed with the receiving nurse.    Opportunity for questions and clarification was provided.      Assessment completed upon patient's arrival to unit and care assumed.

## 2025-03-23 NOTE — GROUP NOTE
Group Therapy Note    Date: 3/23/2025    Group Start Time: 1555  Group End Time: 1635  Group Topic: Recreational    SSR 2 BH NON ACUTE    Roselyn Hernandez        Group Therapy Note    Facilitated leisure skills group to reinforce positive coping and to manage mood through music, social interaction, group activities and art task       Attendees: 4/8      Notes:  Encouraged but did not attend    Discipline Responsible: Recreational Therapist      Signature:  LAURI Fuentes

## 2025-03-23 NOTE — GROUP NOTE
Group Therapy Note    Date: 3/23/2025    Group Start Time: 1110  Group End Time: 1140  Group Topic: Education Group - Inpatient    SSR 2  NON ACUTE    Roselyn Hernandez        Group Therapy Note    Facilitated group to focus on understanding the importance of healthy boundaries and developing healthy boundaries to help improve relationships        Attendees: 3/8      Notes:  Encouraged but did not attend    Discipline Responsible: Recreational Therapist      Signature:  LAURI Fuentes

## 2025-03-23 NOTE — BSMART NOTE
Patient accepted by JAJA Barragan/ Dr. Gill accepted 241a, [facility] [phone] .   
shows no evidence of impairment.  The patient's perception shows no evidence of impairment. The patient's memory shows no evidence of impairment.  The patient's appetite shows no evidence of impairment .  The patient's sleep has evidence of insomnia. The patient's insight shows no evidence of impairment.  The patient's judgement shows no evidence of impairment.      Section V - Substance Abuse  The patient is not using substances.  The patient reported a history of alcohol use (no use in 30 years), cocaine, and opiate use.    Section VI - Living Arrangements  The patient unknown marital status. The patient is homeless, was living at an [facility] from Feb 2025-3/19/25. The patient has 2 adult children (29 and 33). The patient does not plan to return home upon discharge.  The patient does not have legal issues pending. The patient's source of income comes from disability.  Restorationist and cultural practices have not been voiced at this time.    The patient's greatest support comes from aunt, Katie, and this person will be involved with the treatment.    The patient not assessed been in an event described as horrible or outside the realm of ordinary life experience either currently or in the past.  The patient not assessed been a victim of sexual/physical abuse.    Section VII - Other Areas of Clinical Concern  The highest grade achieved is not assessed with the overall quality of school experience being described as not assessed.  The patient is currently disabled and speaks English as a primary language.  The patient has no communication impairments affecting communication. The patient's preference for learning can be described as: can read and write adequately.  The patient's hearing is normal.  The patient's vision is normal.      Yudy Frias LCSW

## 2025-03-23 NOTE — PLAN OF CARE
Problem: Behavior  Goal: Pt/Family maintain appropriate behavior and adhere to behavioral management agreement, if implemented  Description: INTERVENTIONS:  1. Assess patient/family's coping skills and  non-compliant behavior (including use of illegal substances)  2. Notify security of behavior or suspected illegal substances which indicate the need for search of the family and/or belongings  3. Encourage verbalization of thoughts and concerns in a socially appropriate manner  4. Utilize positive, consistent limit setting strategies supporting safety of patient, staff and others  5. Encourage participation in the decision making process about the behavioral management agreement  6. If a visitor's behavior poses a threat to safety call refer to organization policy.  7. Initiate consult with , Psychosocial CNS, Spiritual Care as appropriate  Outcome: Progressing     Pt received in bed, asleep, and he spent the morning sleeping. Refused to allow vital signs to be monitored. Pt did not get up for bfkt and snack, although called. Pt did not have 0900 scheduled meds.  Pt did not get up, for lunch, when called; however, approx 2 hours later, pt got up and asked how many meals he had missed. Pt's lunch tray was saved and he accepted 3/4, in the day room, and returned to bed. Up to the day room, for dinner; consumed 100% and then made some phone calls. Denied feeling depressed, anxious, and/or suicidal. Q 15 mins checks maintained, for safety.

## 2025-03-23 NOTE — BH NOTE
PSYCHOSOCIAL ASSESSMENT  :Patient identifying info:   Janes Frias is a 65 y.o., male admitted 3/22/2025 10:52 PM     Presenting problem and precipitating factors: Pt recently left Assisted Living facility ([facility]) and is now homeless. He reports that he has been off his medication for a couple of days. Pt states that he has been sleeping [place] [employer] [place]. He reports that he has not been sleeping for several days because he has been off of his medications. He smokes cigarettes and occasional THC.     Mental status assessment: Per bsmart, Pt was initially irritable and easily agitated but was apologetic shortly after. Patient was focused on receiving his medications and sleeping. Pt denies SI/HI and states that he has never been suicidal in his life.   Pt was sleeping during rounds.     Strengths/Recreation/Coping Skills:to be assessed     Collateral information: to be obtained    Current psychiatric /substance abuse providers and contact info: Pt has a  at [facility]    Previous psychiatric/substance abuse providers and response to treatment: Pt reported a history of Bipolar Disorder first diagnosed in the 1970's. He reported a history of multiple psychiatric hospitalizations and estimated most recent to be May 2024 at [facility].     Family history of mental illness or substance abuse: unk    Substance abuse history:  Tox + THC  Social History     Tobacco Use    Smoking status: Every Day     Current packs/day: 0.50     Types: Cigarettes    Smokeless tobacco: Never   Substance Use Topics    Alcohol use: No       History of biomedical complications associated with substance abuse: na    Patient's current acceptance of treatment or motivation for change: Medication management    Family constellation:  The patient has 2 adult children (29 and 33).  Unknown marital status    Is significant other involved? No    Describe support system: Aunt Katie    Describe living arrangements and

## 2025-03-23 NOTE — H&P
INITIAL PSYCHIATRIC EVALUATION            IDENTIFICATION:    Patient Name  Janes Frias   Date of Birth 1960   [facility] [id number]   Medical Record Number  [id number]      Age  65 y.o.   PCP None, None   Admit date:  3/22/2025    Room Number  241/01  @ [facility]   Date of Service  3/23/2025            HISTORY         REASON FOR HOSPITALIZATION: Bipolar Disorder I,     CC: \"I'm just tired.\"       HISTORY OF PRESENT ILLNESS:     The patient, Janes Frias, is a 65 y.o. male with a history of Bipolar I Disorder, is a voluntary admission from the [facility] ER expressing suicidal ideations and was then admitted to the [facility] on 3/22/25.  He was living at an assisted living facility, [facility] , but left on Wednesday after having an argument with the owner and has been off his medications.  He reports he tends to decompensate quickly when he does not take his medications.  He was unhoused for 3 days and staying in a [employer] parking lot and has been unable to sleep.    The patient was first diagnosed in the 1970's of Bipolar Disorder. He was a resident of [facility] since Feb 2025 where he is able to get his medications daily.  Since being off his meds abruptly he has been experiencing physical symptoms including shortness of breath. He does smoke daily and uses marijuana.  UDS was positive for THC.   He has a hx of alcohol use (sober for many years), cocaine, and opiate use.  He has his GED and attended some college colleges.  He has children and grandchildren.  His support system includes his aunt, Katie.   He has been incarcerated before for assaulting police officers and was charged with a misdemeanor.  He is on disability.  He has a  at [employer].  Med hx includes Thyroid disorder, BPH, and Hernia      Subjective:  Patient was asleep during rounds.  Appears disheveled in the bed.  He opened his eyes and said he was tired and glad he's finally able to sleep.

## 2025-03-24 LAB
DATE LAST DOSE: NORMAL
DOSE AMOUNT: NORMAL UNITS
EKG ATRIAL RATE: 76 BPM
EKG DIAGNOSIS: NORMAL
EKG P AXIS: 64 DEGREES
EKG P-R INTERVAL: 156 MS
EKG Q-T INTERVAL: 398 MS
EKG QRS DURATION: 92 MS
EKG QTC CALCULATION (BAZETT): 447 MS
EKG R AXIS: 80 DEGREES
EKG T AXIS: 65 DEGREES
EKG VENTRICULAR RATE: 76 BPM
LITHIUM SERPL-SCNC: 1.2 MMOL/L (ref 0.6–1.2)

## 2025-03-24 PROCEDURE — 1240000000 HC EMOTIONAL WELLNESS R&B

## 2025-03-24 PROCEDURE — 6370000000 HC RX 637 (ALT 250 FOR IP)

## 2025-03-24 PROCEDURE — 80178 ASSAY OF LITHIUM: CPT

## 2025-03-24 PROCEDURE — 93010 ELECTROCARDIOGRAM REPORT: CPT | Performed by: SPECIALIST

## 2025-03-24 PROCEDURE — 6370000000 HC RX 637 (ALT 250 FOR IP): Performed by: PSYCHIATRY & NEUROLOGY

## 2025-03-24 PROCEDURE — 36415 COLL VENOUS BLD VENIPUNCTURE: CPT

## 2025-03-24 RX ADMIN — LITHIUM CARBONATE 600 MG: 300 TABLET, EXTENDED RELEASE ORAL at 20:48

## 2025-03-24 RX ADMIN — QUETIAPINE FUMARATE 300 MG: 300 TABLET ORAL at 20:48

## 2025-03-24 RX ADMIN — TRAZODONE HYDROCHLORIDE 50 MG: 50 TABLET ORAL at 20:50

## 2025-03-24 RX ADMIN — ALUMINUM HYDROXIDE, MAGNESIUM HYDROXIDE, AND SIMETHICONE 30 ML: 200; 200; 20 SUSPENSION ORAL at 20:48

## 2025-03-24 NOTE — GROUP NOTE
Group Therapy Note    Date: 3/24/2025    Group Start Time: 1550  Group End Time: 1630  Group Topic: Recreational    SSR 2 BH NON ACUTE    Roselyn Hernandez        Group Therapy Note    Facilitated leisure skills group to reinforce positive coping and to manage mood through music, social interaction, group activities and art task       Attendees: 4/10      Notes:  Encouraged but did not attend    Discipline Responsible: Recreational Therapist      Signature:  LAURI Fuentes

## 2025-03-24 NOTE — PLAN OF CARE
Problem: Anxiety  Goal: Will report anxiety at manageable levels  Description: INTERVENTIONS:  1. Administer medication as ordered  2. Teach and rehearse alternative coping skills  3. Provide emotional support with 1:1 interaction with staff  Outcome: Progressing    Pt has been intermittently visible in the milieu. Compliant with scheduled medications. Pt denies SI/HI/AVH and reports current mood as, \"okay.\"

## 2025-03-24 NOTE — GROUP NOTE
Group Therapy Note    Date: 3/24/2025    Group Start Time: 1330  Group End Time: 1400  Group Topic: Process Group - Inpatient    SSR 2 [facility] ACUTE    Tiffanie Charles        Group Therapy Note: This writer gave each individual a handout on \"my strengths and qualities\" and the responses were discussed in a group setting among peers.     Attendees: 3       Patient's Goal:  to attend groups.     Notes:  Pt was encouraged to attend but did not.       Signature:  Tiffanie Charles

## 2025-03-24 NOTE — CONSULTS
Consult Date: 3/24/2025    Chief Complaint: No chief complaint on file.     HPI: HPI patient is a 65-year-old white male who has been admitted here for psychiatry evaluation treatment denies any history of cough fever chills signs of chest pain shortness of breath nausea vomiting diarrhea abdominal pain or black stool.  No history of headache dizziness loss of consciousness or seizures no history of change in appetite or weight no history of ear or nasal discharge no history of throat pain or earache no history of any change in vision  No history of joint pain swelling increased frequency of micturition or painful micturition.  No history of ear or nasal discharge no history of throat pain or earache  ROS:Review of Systems     Constitutional: Negative  HEENT: Negative  CVS: Negative  RS: Negative  GI: Negative  : Negative  Musculoskeletal: Negative  Immunology: Records are not available  Neurology: Negative  Endocrine: Negative  Haem-Onc: Negative  Skin: Negative  Psychiatry: Bipolar disorder  Allergies  Allergies   Allergen Reactions    Bee Venom Swelling    Risperidone Er Other (See Comments)     Allergy to Uzedy   Jerking and akathisia     FAMILY HISTORY:  No family history on file.  SOCIAL HISTORY:  Social History     Socioeconomic History    Marital status:      Spouse name: Not on file    Number of children: Not on file    Years of education: Not on file    Highest education level: Not on file   Occupational History    Not on file   Tobacco Use    Smoking status: Every Day     Current packs/day: 0.50     Types: Cigarettes    Smokeless tobacco: Never   Substance and Sexual Activity    Alcohol use: No    Drug use: Not Currently     Types: Marijuana (Weed), Cocaine, Prescription     Comment: Smokes Marijuana    Sexual activity: Not Currently   Other Topics Concern    Not on file   Social History Narrative         ** Merged History Encounter **     Social Drivers of Health     Financial Resource 
medical conditions aside from psychiatric.  Per chart review, noted history of hypothyroidism, although, patient states he has no current thyroid issues and takes no prescribed medications.  Patient states he feels comfortable and wants to continue sleep.    Past Medical History:   Diagnosis Date    Bipolar 1 disorder (HCC)     Depression     Vanessa (HCC)     Mood disorder     Psychotic disorder (HCC)     Substance abuse (HCC)         Past Surgical History:   Procedure Laterality Date    [place] UNLISTED PROCEDURE ABDOMEN PERITONEUM & OMENTUM      hernia       Social History     Tobacco Use    Smoking status: Every Day     Current packs/day: 0.50     Types: Cigarettes    Smokeless tobacco: Never   Substance Use Topics    Alcohol use: No        No family history on file.  Allergies   Allergen Reactions    Bee Venom Swelling    Risperidone Er Other (See Comments)     Allergy to Uzedy   Jerking and akathisia        Prior to Admission medications    Medication Sig Start Date End Date Taking? Authorizing Provider   risperiDONE (RISPERDAL) 3 MG tablet Take 1 tablet by mouth 2 times daily   Yes ProviderLorrie MD   levothyroxine (SYNTHROID) 75 MCG tablet Take 1 tablet by mouth daily 9/30/23  Yes ProviderLorrie MD   lithium (LITHOBID) 300 MG extended release tablet Take 2 tablets by mouth at bedtime 9/30/23  Yes Provider, MD Lorrie   ondansetron (ZOFRAN-ODT) 4 MG disintegrating tablet Take 1 tablet by mouth 3 times daily as needed for Nausea or Vomiting 3/9/25   Katie Ballard MD   QUEtiapine (SEROQUEL) 300 MG tablet Take 1 tablet by mouth nightly 6/1/24 7/1/24  Daniela Weeks PA-C   acetaminophen (TYLENOL) 325 MG tablet Take 2 tablets by mouth every 6 hours as needed for Pain 6/1/24   Daniela Weeks PA-C   naproxen (NAPROSYN) 500 MG tablet Take 1 tablet by mouth daily as needed for Pain 6/1/24   Daniela Weeks PA-C   famotidine (PEPCID) 20 MG tablet Take 1 tablet by mouth 2 times daily  Patient

## 2025-03-24 NOTE — BH NOTE
Pt.is lying in bed at present time,does get up for meals, denies feeling suicidal ,irritable at times but then apologizes for his behavior, insight and judgement is poor,denies homicidal ideations,no other concerns voiced, remains on close observation.

## 2025-03-24 NOTE — GROUP NOTE
Group Therapy Note    Date: 3/24/2025    Group Start Time: 1120  Group End Time: 1155  Group Topic: Education Group - Inpatient    SSR 2  NON ACUTE    Roselyn Hernandez        Group Therapy Note    Facilitated group to discuss “reaction patterns” to different situations and recognizing patterns in behaviors that need to be changed for future reactions       Attendees: 4/10      Notes:  Encouraged but did not attend    Discipline Responsible: Recreational Therapist      Signature:  LAURI Fuentes

## 2025-03-24 NOTE — BH NOTE
Behavioral Health Treatment Team Note     Patient goal(s) for today: to rest  Treatment team focus/goals: Medication management, group therapy, discharge planning    Progress note: Pt was initially assessed in bed alert and oriented. He denies SI/HI/AVH and reported feeling \"tired\". Pt states he left [facility] and a Ms. Frias at [facility] [phone] was helping him find placement. 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 Mr. Dong's voice mail box is full, unable to leave a message. Writer was unable to reach the [facility]  (call wouldn't go through). Writer spoke with Ms. Courtney, the  at [facility] and said pt is not welcome back. He broke curfew several times and really doesn't want to be there. She told him if he walks out he cannot come back. Encouraged pt to talk with the doctor tomorrow about discharging. primary Medicare and secondary Medicaid insurance.     LOS:  2  Expected LOS: 5-7 days    Insurance info/prescription coverage:  Medicare/Medicaid  Date of last family contact:  None  Family requesting physician contact today:  No  Discharge plan:  Pt wants to get back to work  Guns in the home:  No  Outpatient provider(s):  [facility]    Participating treatment team members: Janes Frias, BRADEN SAN

## 2025-03-24 NOTE — GROUP NOTE
Group Therapy Note    Date: 3/24/2025    Group Start Time: 1100  Group End Time: 1115  Group Topic: Community Meeting    SSR 2 [facility]    Larisa Abarca        Group Therapy Note    Attendees: 4/10       Notes:  encouraged but did not  attend.   Discipline Responsible: Behavorial Health Tech      Signature:  Larisa Abarca

## 2025-03-25 VITALS
WEIGHT: 161 LBS | TEMPERATURE: 98.6 F | OXYGEN SATURATION: 99 % | HEART RATE: 60 BPM | SYSTOLIC BLOOD PRESSURE: 100 MMHG | BODY MASS INDEX: 25.27 KG/M2 | DIASTOLIC BLOOD PRESSURE: 72 MMHG | RESPIRATION RATE: 16 BRPM | HEIGHT: 67 IN

## 2025-03-25 RX ORDER — LITHIUM CARBONATE 300 MG/1
600 TABLET, FILM COATED, EXTENDED RELEASE ORAL NIGHTLY
Qty: 60 TABLET | Refills: 1 | Status: SHIPPED | OUTPATIENT
Start: 2025-03-25

## 2025-03-25 RX ORDER — TRAZODONE HYDROCHLORIDE 50 MG/1
50 TABLET ORAL NIGHTLY PRN
Qty: 30 TABLET | Refills: 1 | Status: SHIPPED | OUTPATIENT
Start: 2025-03-25

## 2025-03-25 RX ORDER — QUETIAPINE FUMARATE 300 MG/1
300 TABLET, FILM COATED ORAL
Qty: 30 TABLET | Refills: 1 | Status: SHIPPED | OUTPATIENT
Start: 2025-03-25 | End: 2025-04-24

## 2025-03-25 NOTE — PLAN OF CARE
Problem: Coping  Goal: Pt/Family able to verbalize concerns and demonstrate effective coping strategies  Description: INTERVENTIONS:  1. Assist patient/family to identify coping skills, available support systems and cultural and spiritual values  2. Provide emotional support, including active listening and acknowledgement of concerns of patient and caregivers  3. Reduce environmental stimuli, as able  4. Instruct patient/family in relaxation techniques, as appropriate  5. Assess for spiritual pain/suffering and initiate Spiritual Care, Psychosocial Clinical Specialist consults as needed  3/24/2025 1021 by Cathi Blackburn LPN  Outcome: Progressing     Problem: Behavior  Goal: Pt/Family maintain appropriate behavior and adhere to behavioral management agreement, if implemented  Description: INTERVENTIONS:  1. Assess patient/family's coping skills and  non-compliant behavior (including use of illegal substances)  2. Notify security of behavior or suspected illegal substances which indicate the need for search of the family and/or belongings  3. Encourage verbalization of thoughts and concerns in a socially appropriate manner  4. Utilize positive, consistent limit setting strategies supporting safety of patient, staff and others  5. Encourage participation in the decision making process about the behavioral management agreement  6. If a visitor's behavior poses a threat to safety call refer to organization policy.  7. Initiate consult with , Psychosocial CNS, Spiritual Care as appropriate  3/24/2025 1021 by Cathi Blackburn LPN  Outcome: Progressing     Problem: Anxiety  Goal: Will report anxiety at manageable levels  Description: INTERVENTIONS:  1. Administer medication as ordered  2. Teach and rehearse alternative coping skills  3. Provide emotional support with 1:1 interaction with staff  Outcome: Progressing     Problem: ABCDS Injury Assessment  Goal: Absence of physical injury  Outcome:

## 2025-03-25 NOTE — BH NOTE
Patient has been isolative to room. A&OX4. Remained in bed but got up for medications. Complained of heartburn. Given maalox. Complained of anxiety 4/10 and depression 5/10. Denied any SI/HI/ AVH.  Requested trazodone for sleep.  Cooperative. Flat affect. Sad mood. No acute distress noted. Remains on q 15 minutes checks for safety.

## 2025-03-25 NOTE — BH NOTE
Behavioral Health Transition Record to Provider    Patient Name: Janes Frias  YOB: 1960  Medical Record Number: [id number]  Date of Admission: 3/22/2025  Date of Discharge: 3/25/2025    Attending Provider: Kvng Gill MD  Discharging Provider: Dr. Gill  To contact this individual call [phone] and ask the  to page.  If unavailable, ask to be transferred to Behavioral Health Provider on call.  A Behavioral Health Provider will be available on call 24/7 and during holidays.    Primary Care Provider: None, None    Allergies   Allergen Reactions    Bee Venom Swelling    Risperidone Er Other (See Comments)     Allergy to Uzedy   Jerking and akathisia       Reason for Admission: Pt recently left Assisted Living facility ([facility]) and is now homeless. He reports that he has been off his medication for a couple of days.     Admission Diagnosis: Bipolar 1 disorder (HCC) [F31.9]    * No surgery found *    Results for orders placed or performed during the hospital encounter of 03/22/25   Lithium Level   Result Value Ref Range    Lithium 1.20 0.60 - 1.20 mmol/L    DOSE DATE Not provided      DOSE AMOUNT Not provided Units       Immunizations administered during this encounter: There is no immunization history for the selected administration types on file for this patient.    Screening for Metabolic Disorders for Patients on Antipsychotic Medications  (Data obtained from the EMR)    Estimated Body Mass Index  Estimated body mass index is 25.22 kg/m² as calculated from the following:    Height as of this encounter: 1.702 m (5' 7\").    Weight as of this encounter: 73 kg (161 lb).     Vital Signs/Blood Pressure  BP 91/69   Pulse 58   Temp 98.6 °F (37 °C) (Oral)   Resp 16   Ht 1.702 m (5' 7\")   Wt 73 kg (161 lb)   SpO2 94%   BMI 25.22 kg/m²     Blood Glucose/Hemoglobin A1c  No results found for: \"GLU\", \"GLUCPOC\"    No results found for: \"HBA1C\"     Lipid Panel  Lab Results

## 2025-03-25 NOTE — BH NOTE
Problem note for March 24, 2025 patient was admitted by term    Patient apparently walked out of adult living facility they told him if he leaves here he can come back apparently he broke curfew I guess not following the rules regulations.  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  Apparently  tried to reach could not reach continued inpatient level of care indicated for her follow-up.  Of treatment to clear his depression irritability and have appropriate safe discharge planning vital signs today pulse 58 blood pressure 91/69 temperature 98.6 respirations 16 O2 saturation 94%

## 2025-03-25 NOTE — BH NOTE
NURSING DISCHARGE NOTE    Discharged to go to a shelter, in [place], [place]. Written follow up info given/explained; verbalized he understood. Written Rx also given, and pt was advised to take to pharmacy of his choice. All personal belongings and valuable (cell phone) returned to pt. Escorted down to [facility], by HONEY Carroll.

## 2025-03-25 NOTE — BH NOTE
DISCHARGE SUMMARY    NAME:Janes Frias  : 1960  MRN: [id number]    The patient Janes Frias exhibits the ability to control behavior in a less restrictive environment.  Patient's level of functioning is improving.  No assaultive/destructive behavior has been observed for the past 24 hours.  No suicidal/homicidal threat or behavior has been observed for the past 24 hours.  There is no evidence of serious medication side effects.  Patient has not been in physical or protective restraints for at least the past 24 hours.    If weapons involved, how are they secured? N/a    Is patient aware of and in agreement with discharge plan? Yes    Arrangements for medication:  Prescriptions printed    Copy of discharge instructions to provider?:  n/a    Arrangements for transportation home:  [facility] cab to [facility]    Keep all follow up appointments as scheduled, continue to take prescribed medications per physician instructions.  Mental health crisis number:  [phone]      Mental Health Emergency WARM LINE      [phone] ([phone])      M-F: 9am to 9pm      Sat & Sun: 5pm - 9pm  National suicide prevention lines:                             3-051-RVYVJVP ([phone])       [phone]-TALK ([phone])    Crisis Text Line:  Text HOME to 237277

## 2025-03-25 NOTE — PLAN OF CARE
Problem: Behavior  Goal: Pt/Family maintain appropriate behavior and adhere to behavioral management agreement, if implemented  Description: INTERVENTIONS:  1. Assess patient/family's coping skills and  non-compliant behavior (including use of illegal substances)  2. Notify security of behavior or suspected illegal substances which indicate the need for search of the family and/or belongings  3. Encourage verbalization of thoughts and concerns in a socially appropriate manner  4. Utilize positive, consistent limit setting strategies supporting safety of patient, staff and others  5. Encourage participation in the decision making process about the behavioral management agreement  6. If a visitor's behavior poses a threat to safety call refer to organization policy.  7. Initiate consult with , Psychosocial CNS, Spiritual Care as appropriate  3/24/2025 224 by Valencia Robin RN  Outcome: Progressing    Affect full/pleasant. Cont to spend the majority of x, in bed. Up to the day room, for bfkt; consumed 100% and returned to bed. Pt did not get up, for morning group, although called/encouraged.  Pt informed he would be d/c today, and he got up and prepared himself. Denied feeling depressed, anxious, and/or suicidal.  Q 15 mins checks maintained, for safety.

## 2025-03-25 NOTE — GROUP NOTE
Group Therapy Note    Date: 3/25/2025    Group Start Time: 0941  Group End Time: 1005  Group Topic: Community Meeting    SSR 2 [facility]    Helen Adams        Group Therapy Note    Attendees: 6/11       Patient's Goal:      Notes:  Patient encouraged to attend but decline to attend.    Status After Intervention:      Participation Level:     Participation Quality:       Speech:        Thought Process/Content:       Affective Functioning:       Mood:       Level of consciousness:        Response to Learning:       Endings:     Modes of Intervention:       Discipline Responsible: Behavorial Health Tech      Signature:  Helen Adams

## 2025-04-02 ENCOUNTER — HOSPITAL ENCOUNTER (EMERGENCY)
Facility: HOSPITAL | Age: 65
Discharge: LEFT BEFORE DISCHARGE | End: 2025-04-02

## 2025-04-03 ENCOUNTER — HOSPITAL ENCOUNTER (EMERGENCY)
Facility: HOSPITAL | Age: 65
Discharge: HOME | End: 2025-04-03
Attending: EMERGENCY MEDICINE
Payer: MEDICARE

## 2025-04-03 VITALS
HEIGHT: 68 IN | BODY MASS INDEX: 25.01 KG/M2 | WEIGHT: 165 LBS | RESPIRATION RATE: 20 BRPM | DIASTOLIC BLOOD PRESSURE: 74 MMHG | OXYGEN SATURATION: 95 % | SYSTOLIC BLOOD PRESSURE: 123 MMHG | HEART RATE: 66 BPM | TEMPERATURE: 98.1 F

## 2025-04-03 DIAGNOSIS — R44.3 HALLUCINATIONS: Primary | ICD-10-CM

## 2025-04-03 DIAGNOSIS — Z91.148 NONCOMPLIANCE WITH MEDICATION REGIMEN: ICD-10-CM

## 2025-04-03 PROCEDURE — 99283 EMERGENCY DEPT VISIT LOW MDM: CPT

## 2025-04-03 PROCEDURE — 6370000000 HC RX 637 (ALT 250 FOR IP): Performed by: EMERGENCY MEDICINE

## 2025-04-03 RX ORDER — LITHIUM CARBONATE 300 MG
300 TABLET ORAL
Status: COMPLETED | OUTPATIENT
Start: 2025-04-03 | End: 2025-04-03

## 2025-04-03 RX ADMIN — QUETIAPINE FUMARATE 300 MG: 200 TABLET ORAL at 00:46

## 2025-04-03 RX ADMIN — LITHIUM CARBONATE 300 MG: 300 TABLET ORAL at 00:46

## 2025-04-03 ASSESSMENT — PAIN - FUNCTIONAL ASSESSMENT: PAIN_FUNCTIONAL_ASSESSMENT: NONE - DENIES PAIN

## 2025-04-03 NOTE — ED TRIAGE NOTES
Pt presents to ED with CC intermittent visual hallucinations, \"I am scratching myself to death, I am marching in place, I am chewing on my tongue\"  Pt reports he has been out of meds, Seroquel, Lithium, Risperidone for 5 days    Pt asking \"can't I check myself in for 72 hours\"    Pt denies SI, HI    Pt denies drug/alcohol use

## 2025-04-03 NOTE — ED NOTES
MD reviewed discharge instructions and options with patient and patient verbalized understanding. RN reviewed discharge instructions using teachback method. Pt ambulated to exit without difficulty and in no signs of acute distress, and the [facility]  will drive home. No complaints or needs expressed at this time. Patient was counseled on medications prescribed at discharge. VSS, verbalized relief from most intense pain. Patient to call [facility] in the morning for appointment.

## 2025-04-03 NOTE — ED NOTES
Pt presents to ED ambulatory complaining of homelessness, and having been off of his psychiatric meds for several days. Denies SI/IHI. Denies AH/VH. Pt is alert and oriented x 4, RR even and unlabored, skin is warm and dry. Assessment completed and pt updated on plan of care.  Call bell in reach.        Emergency Department Nursing Plan of Care       The Nursing Plan of Care is developed from the Nursing assessment and Emergency Department Attending provider initial evaluation.  The plan of care may be reviewed in the “ED Provider note”.    The Plan of Care was developed with the following considerations:   Patient / Family readiness to learn indicated by:verbalized understanding  Persons(s) to be included in education: patient  Barriers to Learning/Limitations:None    Signed

## 2025-04-03 NOTE — ED PROVIDER NOTES
[facility] EMERGENCY DEPARTMENT  EMERGENCY DEPARTMENT ENCOUNTER       Pt Name: Janes Frias  MRN: [id number]  Birthdate 1960  Date of evaluation: 4/3/2025  Provider: Katie Ballard MD   PCP: None, None  Note Started: 3:53 AM 4/3/25     (Please note that parts of this dictation were completed with voice recognition software. Quite often unanticipated grammatical, syntax, homophones, and other interpretive errors are inadvertently transcribed by the computer software. Please disregards these errors. Please excuse any errors that have escaped final proofreading.)    CHIEF COMPLAINT       Chief Complaint   Patient presents with    Hallucinations     Intermittent visual for a few days        HISTORY OF PRESENT ILLNESS: 1 or more elements      History From: patient, History limited by:  none     Janes Frias is a 65 y.o. male who presents because he is out of his Seroquel, lithium, Risperdal.  States he has a history of bipolar disorder.  He is from [place] states his family kicked him out and now he is in [place] and is not plugged in with psychiatry here.  He would like his medication sent to [facility] on [address] and brought.  He states he is scratching himself has hallucination and is \"chewing on my tongue.\"  Requesting information for home assistance.  Denies suicidal or homicidal ideation.     Nursing Notes were all reviewed and agreed with or any disagreements were addressed in the HPI.     REVIEW OF SYSTEMS        Positives and Pertinent negatives as per HPI.    PAST HISTORY     Past Medical History:  Past Medical History:   Diagnosis Date    Bipolar 1 disorder (HCC)     Depression     Vanessa (HCC)     Mood disorder     Psychotic disorder (HCC)     Substance abuse (HCC)        Past Surgical History:  Past Surgical History:   Procedure Laterality Date    [place] UNLISTED PROCEDURE ABDOMEN PERITONEUM & OMENTUM      hernia       Family History:  No family history on file.    Social History:  Social